# Patient Record
Sex: MALE | Race: WHITE | NOT HISPANIC OR LATINO | Employment: OTHER | URBAN - METROPOLITAN AREA
[De-identification: names, ages, dates, MRNs, and addresses within clinical notes are randomized per-mention and may not be internally consistent; named-entity substitution may affect disease eponyms.]

---

## 2017-01-29 ENCOUNTER — HOSPITAL ENCOUNTER (EMERGENCY)
Facility: HOSPITAL | Age: 77
Discharge: HOME | End: 2017-01-30
Attending: EMERGENCY MEDICINE
Payer: MEDICARE

## 2017-01-29 ENCOUNTER — APPOINTMENT (EMERGENCY)
Dept: RADIOLOGY | Facility: HOSPITAL | Age: 77
End: 2017-01-29
Payer: MEDICARE

## 2017-01-29 DIAGNOSIS — I50.9 CHF EXACERBATION (HCC): Primary | ICD-10-CM

## 2017-01-29 PROCEDURE — 93005 ELECTROCARDIOGRAM TRACING: CPT | Performed by: EMERGENCY MEDICINE

## 2017-01-29 PROCEDURE — 85025 COMPLETE CBC W/AUTO DIFF WBC: CPT | Performed by: EMERGENCY MEDICINE

## 2017-01-29 PROCEDURE — 81001 URINALYSIS AUTO W/SCOPE: CPT | Performed by: EMERGENCY MEDICINE

## 2017-01-29 PROCEDURE — 87086 URINE CULTURE/COLONY COUNT: CPT | Performed by: EMERGENCY MEDICINE

## 2017-01-29 PROCEDURE — 36415 COLL VENOUS BLD VENIPUNCTURE: CPT | Performed by: EMERGENCY MEDICINE

## 2017-01-29 RX ORDER — OXYCODONE HYDROCHLORIDE 10 MG/1
15 TABLET ORAL EVERY 6 HOURS PRN
COMMUNITY
End: 2017-12-19

## 2017-01-29 RX ORDER — ROSUVASTATIN CALCIUM 40 MG/1
40 TABLET, COATED ORAL DAILY
COMMUNITY
End: 2019-01-01 | Stop reason: HOSPADM

## 2017-01-30 ENCOUNTER — APPOINTMENT (EMERGENCY)
Dept: RADIOLOGY | Facility: HOSPITAL | Age: 77
End: 2017-01-30
Payer: MEDICARE

## 2017-01-30 VITALS
SYSTOLIC BLOOD PRESSURE: 183 MMHG | TEMPERATURE: 97.8 F | BODY MASS INDEX: 37.31 KG/M2 | WEIGHT: 260 LBS | DIASTOLIC BLOOD PRESSURE: 85 MMHG | OXYGEN SATURATION: 97 % | HEART RATE: 98 BPM | RESPIRATION RATE: 20 BRPM

## 2017-01-30 LAB
ANION GAP SERPL CALCULATED.3IONS-SCNC: 9 MMOL/L (ref 4–13)
ATRIAL RATE: 85 BPM
BACTERIA UR QL AUTO: NORMAL /HPF
BASOPHILS # BLD AUTO: 0.02 THOUSANDS/ΜL (ref 0–0.1)
BASOPHILS NFR BLD AUTO: 0 % (ref 0–1)
BILIRUB UR QL STRIP: NEGATIVE
BUN SERPL-MCNC: 39 MG/DL (ref 5–25)
CALCIUM SERPL-MCNC: 8.8 MG/DL (ref 8.3–10.1)
CHLORIDE SERPL-SCNC: 103 MMOL/L (ref 100–108)
CLARITY UR: CLEAR
CO2 SERPL-SCNC: 27 MMOL/L (ref 21–32)
COLOR UR: YELLOW
CREAT SERPL-MCNC: 1.59 MG/DL (ref 0.6–1.3)
EOSINOPHIL # BLD AUTO: 0.27 THOUSAND/ΜL (ref 0–0.61)
EOSINOPHIL NFR BLD AUTO: 3 % (ref 0–6)
ERYTHROCYTE [DISTWIDTH] IN BLOOD BY AUTOMATED COUNT: 13.8 % (ref 11.6–15.1)
GFR SERPL CREATININE-BSD FRML MDRD: 42.8 ML/MIN/1.73SQ M
GLUCOSE SERPL-MCNC: 189 MG/DL (ref 65–140)
GLUCOSE UR STRIP-MCNC: ABNORMAL MG/DL
HCT VFR BLD AUTO: 43.4 % (ref 36.5–49.3)
HGB BLD-MCNC: 14.1 G/DL (ref 12–17)
HGB UR QL STRIP.AUTO: ABNORMAL
KETONES UR STRIP-MCNC: NEGATIVE MG/DL
LEUKOCYTE ESTERASE UR QL STRIP: NEGATIVE
LYMPHOCYTES # BLD AUTO: 1.24 THOUSANDS/ΜL (ref 0.6–4.47)
LYMPHOCYTES NFR BLD AUTO: 14 % (ref 14–44)
MCH RBC QN AUTO: 29.8 PG (ref 26.8–34.3)
MCHC RBC AUTO-ENTMCNC: 32.5 G/DL (ref 31.4–37.4)
MCV RBC AUTO: 92 FL (ref 82–98)
MONOCYTES # BLD AUTO: 0.53 THOUSAND/ΜL (ref 0.17–1.22)
MONOCYTES NFR BLD AUTO: 6 % (ref 4–12)
NEUTROPHILS # BLD AUTO: 7.05 THOUSANDS/ΜL (ref 1.85–7.62)
NEUTS SEG NFR BLD AUTO: 77 % (ref 43–75)
NITRITE UR QL STRIP: NEGATIVE
NON-SQ EPI CELLS URNS QL MICRO: NORMAL /HPF
NT-PROBNP SERPL-MCNC: 1276 PG/ML
P AXIS: 67 DEGREES
PH UR STRIP.AUTO: 6 [PH] (ref 4.5–8)
PLATELET # BLD AUTO: 179 THOUSANDS/UL (ref 149–390)
PMV BLD AUTO: 11.9 FL (ref 8.9–12.7)
POTASSIUM SERPL-SCNC: 4.7 MMOL/L (ref 3.5–5.3)
PR INTERVAL: 202 MS
PROT UR STRIP-MCNC: ABNORMAL MG/DL
QRS AXIS: 70 DEGREES
QRSD INTERVAL: 144 MS
QT INTERVAL: 404 MS
QTC INTERVAL: 480 MS
RBC # BLD AUTO: 4.73 MILLION/UL (ref 3.88–5.62)
RBC #/AREA URNS AUTO: NORMAL /HPF
SODIUM SERPL-SCNC: 139 MMOL/L (ref 136–145)
SP GR UR STRIP.AUTO: 1.02 (ref 1–1.03)
T WAVE AXIS: 3 DEGREES
TROPONIN I SERPL-MCNC: 0.04 NG/ML
UROBILINOGEN UR QL STRIP.AUTO: 0.2 E.U./DL
VENTRICULAR RATE: 85 BPM
WBC # BLD AUTO: 9.11 THOUSAND/UL (ref 4.31–10.16)
WBC #/AREA URNS AUTO: NORMAL /HPF

## 2017-01-30 PROCEDURE — 71020 HB CHEST X-RAY 2VW FRONTAL&LATL: CPT

## 2017-01-30 PROCEDURE — 96374 THER/PROPH/DIAG INJ IV PUSH: CPT

## 2017-01-30 PROCEDURE — 36415 COLL VENOUS BLD VENIPUNCTURE: CPT | Performed by: EMERGENCY MEDICINE

## 2017-01-30 PROCEDURE — 99285 EMERGENCY DEPT VISIT HI MDM: CPT

## 2017-01-30 PROCEDURE — 84484 ASSAY OF TROPONIN QUANT: CPT | Performed by: EMERGENCY MEDICINE

## 2017-01-30 PROCEDURE — 83880 ASSAY OF NATRIURETIC PEPTIDE: CPT | Performed by: EMERGENCY MEDICINE

## 2017-01-30 PROCEDURE — 80048 BASIC METABOLIC PNL TOTAL CA: CPT | Performed by: EMERGENCY MEDICINE

## 2017-01-30 RX ORDER — FUROSEMIDE 10 MG/ML
40 INJECTION INTRAMUSCULAR; INTRAVENOUS ONCE
Status: COMPLETED | OUTPATIENT
Start: 2017-01-30 | End: 2017-01-30

## 2017-01-30 RX ADMIN — FUROSEMIDE 40 MG: 10 INJECTION, SOLUTION INTRAMUSCULAR; INTRAVENOUS at 01:42

## 2017-01-31 LAB — BACTERIA UR CULT: NORMAL

## 2017-12-19 ENCOUNTER — HOSPITAL ENCOUNTER (INPATIENT)
Facility: HOSPITAL | Age: 77
LOS: 3 days | Discharge: HOME | DRG: 309 | End: 2017-12-22
Attending: EMERGENCY MEDICINE | Admitting: INTERNAL MEDICINE
Payer: OTHER GOVERNMENT

## 2017-12-19 DIAGNOSIS — I48.91 RAPID ATRIAL FIBRILLATION (HCC): Primary | ICD-10-CM

## 2017-12-19 DIAGNOSIS — N28.9 RENAL INSUFFICIENCY: ICD-10-CM

## 2017-12-19 PROBLEM — E78.5 HYPERLIPIDEMIA: Chronic | Status: ACTIVE | Noted: 2017-12-19

## 2017-12-19 PROBLEM — I50.32 CHRONIC DIASTOLIC CONGESTIVE HEART FAILURE (HCC): Status: ACTIVE | Noted: 2017-12-19

## 2017-12-19 PROBLEM — M51.37 DDD (DEGENERATIVE DISC DISEASE), LUMBOSACRAL: Status: ACTIVE | Noted: 2017-12-19

## 2017-12-19 PROBLEM — E11.22 TYPE 2 DIABETES MELLITUS WITH STAGE 4 CHRONIC KIDNEY DISEASE (HCC): Chronic | Status: ACTIVE | Noted: 2017-12-19

## 2017-12-19 PROBLEM — I10 ESSENTIAL HYPERTENSION: Chronic | Status: ACTIVE | Noted: 2017-12-19

## 2017-12-19 PROBLEM — N18.4 TYPE 2 DIABETES MELLITUS WITH STAGE 4 CHRONIC KIDNEY DISEASE (HCC): Chronic | Status: ACTIVE | Noted: 2017-12-19

## 2017-12-19 LAB
ALBUMIN SERPL BCP-MCNC: 3.2 G/DL (ref 3.5–5)
ALP SERPL-CCNC: 89 U/L (ref 46–116)
ALT SERPL W P-5'-P-CCNC: 28 U/L (ref 12–78)
ANION GAP SERPL CALCULATED.3IONS-SCNC: 8 MMOL/L (ref 4–13)
APTT PPP: 27 SECONDS (ref 23–35)
AST SERPL W P-5'-P-CCNC: 15 U/L (ref 5–45)
ATRIAL RATE: 141 BPM
BASOPHILS # BLD AUTO: 0.03 THOUSANDS/ΜL (ref 0–0.1)
BASOPHILS NFR BLD AUTO: 0 % (ref 0–1)
BILIRUB SERPL-MCNC: 0.4 MG/DL (ref 0.2–1)
BILIRUB UR QL STRIP: NEGATIVE
BUN SERPL-MCNC: 65 MG/DL (ref 5–25)
CALCIUM SERPL-MCNC: 8.9 MG/DL (ref 8.3–10.1)
CHLORIDE SERPL-SCNC: 101 MMOL/L (ref 100–108)
CLARITY UR: CLEAR
CO2 SERPL-SCNC: 29 MMOL/L (ref 21–32)
COLOR UR: YELLOW
CREAT SERPL-MCNC: 2.15 MG/DL (ref 0.6–1.3)
EOSINOPHIL # BLD AUTO: 0.72 THOUSAND/ΜL (ref 0–0.61)
EOSINOPHIL NFR BLD AUTO: 8 % (ref 0–6)
ERYTHROCYTE [DISTWIDTH] IN BLOOD BY AUTOMATED COUNT: 14.4 % (ref 11.6–15.1)
GFR SERPL CREATININE-BSD FRML MDRD: 29 ML/MIN/1.73SQ M
GLUCOSE SERPL-MCNC: 272 MG/DL (ref 65–140)
GLUCOSE SERPL-MCNC: 278 MG/DL (ref 65–140)
GLUCOSE SERPL-MCNC: 321 MG/DL (ref 65–140)
GLUCOSE UR STRIP-MCNC: ABNORMAL MG/DL
HCT VFR BLD AUTO: 42.6 % (ref 36.5–49.3)
HGB BLD-MCNC: 13.8 G/DL (ref 12–17)
HGB UR QL STRIP.AUTO: NEGATIVE
INR PPP: 1.04 (ref 0.86–1.16)
KETONES UR STRIP-MCNC: NEGATIVE MG/DL
LEUKOCYTE ESTERASE UR QL STRIP: NEGATIVE
LYMPHOCYTES # BLD AUTO: 1.9 THOUSANDS/ΜL (ref 0.6–4.47)
LYMPHOCYTES NFR BLD AUTO: 21 % (ref 14–44)
MAGNESIUM SERPL-MCNC: 1.9 MG/DL (ref 1.6–2.6)
MCH RBC QN AUTO: 30.3 PG (ref 26.8–34.3)
MCHC RBC AUTO-ENTMCNC: 32.4 G/DL (ref 31.4–37.4)
MCV RBC AUTO: 93 FL (ref 82–98)
MONOCYTES # BLD AUTO: 1.11 THOUSAND/ΜL (ref 0.17–1.22)
MONOCYTES NFR BLD AUTO: 12 % (ref 4–12)
NEUTROPHILS # BLD AUTO: 5.45 THOUSANDS/ΜL (ref 1.85–7.62)
NEUTS SEG NFR BLD AUTO: 59 % (ref 43–75)
NITRITE UR QL STRIP: NEGATIVE
PH UR STRIP.AUTO: 5.5 [PH] (ref 4.5–8)
PLATELET # BLD AUTO: 182 THOUSANDS/UL (ref 149–390)
PMV BLD AUTO: 11.8 FL (ref 8.9–12.7)
POTASSIUM SERPL-SCNC: 4.9 MMOL/L (ref 3.5–5.3)
PROT SERPL-MCNC: 6.8 G/DL (ref 6.4–8.2)
PROT UR STRIP-MCNC: NEGATIVE MG/DL
PROTHROMBIN TIME: 13.4 SECONDS (ref 12.1–14.4)
QRS AXIS: 70 DEGREES
QRSD INTERVAL: 142 MS
QT INTERVAL: 346 MS
QTC INTERVAL: 516 MS
RBC # BLD AUTO: 4.56 MILLION/UL (ref 3.88–5.62)
SODIUM SERPL-SCNC: 138 MMOL/L (ref 136–145)
SP GR UR STRIP.AUTO: >=1.03 (ref 1–1.03)
T WAVE AXIS: 23 DEGREES
TROPONIN I SERPL-MCNC: 0.07 NG/ML
TSH SERPL DL<=0.05 MIU/L-ACNC: 6.35 UIU/ML (ref 0.36–3.74)
UROBILINOGEN UR QL STRIP.AUTO: 0.2 E.U./DL
VENTRICULAR RATE: 134 BPM
WBC # BLD AUTO: 9.21 THOUSAND/UL (ref 4.31–10.16)

## 2017-12-19 PROCEDURE — 85610 PROTHROMBIN TIME: CPT | Performed by: EMERGENCY MEDICINE

## 2017-12-19 PROCEDURE — 93005 ELECTROCARDIOGRAM TRACING: CPT

## 2017-12-19 PROCEDURE — 83735 ASSAY OF MAGNESIUM: CPT | Performed by: EMERGENCY MEDICINE

## 2017-12-19 PROCEDURE — 96374 THER/PROPH/DIAG INJ IV PUSH: CPT

## 2017-12-19 PROCEDURE — 81003 URINALYSIS AUTO W/O SCOPE: CPT | Performed by: EMERGENCY MEDICINE

## 2017-12-19 PROCEDURE — 80053 COMPREHEN METABOLIC PANEL: CPT | Performed by: EMERGENCY MEDICINE

## 2017-12-19 PROCEDURE — 99285 EMERGENCY DEPT VISIT HI MDM: CPT

## 2017-12-19 PROCEDURE — 82948 REAGENT STRIP/BLOOD GLUCOSE: CPT

## 2017-12-19 PROCEDURE — 85730 THROMBOPLASTIN TIME PARTIAL: CPT | Performed by: EMERGENCY MEDICINE

## 2017-12-19 PROCEDURE — 84443 ASSAY THYROID STIM HORMONE: CPT | Performed by: EMERGENCY MEDICINE

## 2017-12-19 PROCEDURE — 36415 COLL VENOUS BLD VENIPUNCTURE: CPT | Performed by: EMERGENCY MEDICINE

## 2017-12-19 PROCEDURE — 85025 COMPLETE CBC W/AUTO DIFF WBC: CPT | Performed by: EMERGENCY MEDICINE

## 2017-12-19 PROCEDURE — 84484 ASSAY OF TROPONIN QUANT: CPT | Performed by: EMERGENCY MEDICINE

## 2017-12-19 RX ORDER — GABAPENTIN 300 MG/1
100 CAPSULE ORAL 3 TIMES DAILY
COMMUNITY
End: 2019-01-01 | Stop reason: HOSPADM

## 2017-12-19 RX ORDER — ATORVASTATIN CALCIUM 40 MG/1
40 TABLET, FILM COATED ORAL
Status: DISCONTINUED | OUTPATIENT
Start: 2017-12-19 | End: 2017-12-22 | Stop reason: HOSPADM

## 2017-12-19 RX ORDER — ACETAMINOPHEN 325 MG/1
650 TABLET ORAL EVERY 6 HOURS PRN
Status: DISCONTINUED | OUTPATIENT
Start: 2017-12-19 | End: 2017-12-22 | Stop reason: HOSPADM

## 2017-12-19 RX ORDER — DULOXETIN HYDROCHLORIDE 30 MG/1
30 CAPSULE, DELAYED RELEASE ORAL DAILY
Status: DISCONTINUED | OUTPATIENT
Start: 2017-12-19 | End: 2017-12-22 | Stop reason: HOSPADM

## 2017-12-19 RX ORDER — AMLODIPINE BESYLATE 5 MG/1
5 TABLET ORAL DAILY
COMMUNITY
End: 2017-12-22 | Stop reason: HOSPADM

## 2017-12-19 RX ORDER — ONDANSETRON 2 MG/ML
4 INJECTION INTRAMUSCULAR; INTRAVENOUS EVERY 6 HOURS PRN
Status: DISCONTINUED | OUTPATIENT
Start: 2017-12-19 | End: 2017-12-22 | Stop reason: HOSPADM

## 2017-12-19 RX ORDER — DILTIAZEM HYDROCHLORIDE 5 MG/ML
15 INJECTION INTRAVENOUS ONCE
Status: COMPLETED | OUTPATIENT
Start: 2017-12-19 | End: 2017-12-19

## 2017-12-19 RX ADMIN — APIXABAN 5 MG: 5 TABLET, FILM COATED ORAL at 15:37

## 2017-12-19 RX ADMIN — METOPROLOL TARTRATE 25 MG: 25 TABLET ORAL at 21:27

## 2017-12-19 RX ADMIN — INSULIN DETEMIR 50 UNITS: 100 INJECTION, SOLUTION SUBCUTANEOUS at 21:31

## 2017-12-19 RX ADMIN — METOPROLOL TARTRATE 25 MG: 25 TABLET ORAL at 15:35

## 2017-12-19 RX ADMIN — DULOXETINE HYDROCHLORIDE 30 MG: 30 CAPSULE, DELAYED RELEASE ORAL at 15:35

## 2017-12-19 RX ADMIN — INSULIN LISPRO 2 UNITS: 100 INJECTION, SOLUTION INTRAVENOUS; SUBCUTANEOUS at 17:07

## 2017-12-19 RX ADMIN — DILTIAZEM HYDROCHLORIDE 15 MG: 5 INJECTION INTRAVENOUS at 13:50

## 2017-12-19 NOTE — ED NOTES
Patient resting comfortably in bed  No signs of distress  Denies any current needs       Silvia Escamilla RN  12/19/17 5039

## 2017-12-19 NOTE — ED NOTES
Patient states "not opening up the lisinopril and running out the bottle of the combo med (20 mg, hydrochlorothiazide 12 5 mg)     Yash Gómez, RN  12/19/17 7924

## 2017-12-19 NOTE — ED PROVIDER NOTES
History  Chief Complaint   Patient presents with    Rapid Heart Rate     Patient presents to ED after going to his [address] [facility] clinic yesterday 12/18 where he was advised to go to an ED, patient reports coming here leaving and coming in today for evaluation  This is a 72-year-old male who states that he was at his primary care physician's office yesterday when they noticed that he was in a rapid heart rhythm he was instructed to go to the hospital yesterday but he did not get evaluated because he was afraid he did not have insurance coverage she returns now stating that he believes he does have insurance coverage and wishes to get evaluated  He has a history congestive heart failure but no atrial fibrillation he denies any excessive caffeine use he does drink 1 cup of coffee a day denies any decongestant use no alcohol  Also denies any symptoms of infection no cough or urinary burning        History provided by:  Patient  Palpitations   Palpitations quality:  Irregular  Onset quality:  Unable to specify  Duration: 1St noticed yesterday but unsure as to the time onset prior to that  Timing:  Constant  Chronicity:  New  Context: not blood loss, not illicit drugs and not stimulant use    Associated symptoms: no chest pain, no nausea and no vomiting    Risk factors: diabetes mellitus and hyperthyroidism        Prior to Admission Medications   Prescriptions Last Dose Informant Patient Reported? Taking?    amLODIPine (NORVASC) 5 mg tablet   Yes Yes   Sig: Take 5 mg by mouth daily   gabapentin (NEURONTIN) 300 mg capsule   Yes Yes   Sig: Take 100 mg by mouth 3 (three) times a day   insulin aspart (NovoLOG) 100 units/mL injection   Yes Yes   Sig: Inject under the skin 3 (three) times a day before meals   insulin detemir (LEVEMIR) 100 units/mL subcutaneous injection   Yes Yes   Sig: Inject 50 Units under the skin daily at bedtime   lisinopril 20 mg TABS 20 mg, hydrochlorothiazide 12 5 mg TABS 12 5 mg   Yes No   Sig: Take by mouth daily   rosuvastatin (CRESTOR) 40 MG tablet   Yes No   Sig: Take 40 mg by mouth daily      Facility-Administered Medications: None       Past Medical History:   Diagnosis Date    CHF [facility]congestive heart failure) ([facility] )     Diabetes mellitus ([facility] )     Hypertension        Past Surgical History:   Procedure Laterality Date    CAROTID ENDARTERECTOMY Left        Family History   Problem Relation Age of Onset    Cancer Mother     Cancer Father      I have reviewed and agree with the history as documented  Social History   Substance Use Topics    Smoking status: Former Smoker    Smokeless tobacco: Never Used    Alcohol use No        Review of Systems   Cardiovascular: Positive for palpitations  Negative for chest pain  Gastrointestinal: Negative for nausea and vomiting  All other systems reviewed and are negative  Physical Exam  ED Triage Vitals   Temperature Pulse Respirations Blood Pressure SpO2   12/19/17 1210 12/19/17 1204 12/19/17 1204 12/19/17 1204 12/19/17 1204   98 4 °F (36 9 °C) (!) 140 19 109/59 96 %      Temp Source Heart Rate Source Patient Position - Orthostatic VS BP Location FiO2 (%)   12/19/17 1210 12/19/17 1204 12/19/17 1204 12/19/17 1204 --   Tympanic Monitor Lying Right arm       Pain Score       12/19/17 1204       No Pain           Orthostatic Vital Signs  Vitals:    12/19/17 1415 12/19/17 1430 12/19/17 1447 12/19/17 1503   BP:  110/56 113/55 139/67   Pulse: 94 (!) 111 (!) 110 (!) 123   Patient Position - Orthostatic VS:   Lying Sitting       Physical Exam   Constitutional: He is oriented to person, place, and time  He appears well-developed and well-nourished  No distress  HENT:   Head: Atraumatic  Right Ear: External ear normal    Left Ear: External ear normal    Mouth/Throat: Oropharynx is clear and moist    Eyes: EOM are normal  Pupils are equal, round, and reactive to light  No scleral icterus  Neck: Normal range of motion  Neck supple  No tracheal deviation present  Cardiovascular: Intact distal pulses  Exam reveals no gallop and no friction rub  No murmur heard  Tachy and irregular   Pulmonary/Chest: Effort normal and breath sounds normal  No respiratory distress  He has no wheezes  He has no rales  Abdominal: Soft  Bowel sounds are normal  He exhibits no distension  There is no tenderness  There is no guarding  Musculoskeletal: Normal range of motion  He exhibits no edema, tenderness or deformity  Neurological: He is alert and oriented to person, place, and time  No cranial nerve deficit  Skin: Skin is warm and dry  No rash noted  He is not diaphoretic  Psychiatric: He has a normal mood and affect  His behavior is normal  Thought content normal    Nursing note and vitals reviewed        ED Medications  Medications   insulin detemir (LEVEMIR) subcutaneous injection 50 Units (not administered)   atorvastatin (LIPITOR) tablet 40 mg (40 mg Oral Not Given 12/19/17 1535)   metoprolol tartrate (LOPRESSOR) tablet 25 mg (25 mg Oral Given 12/19/17 1535)   apixaban (ELIQUIS) tablet 5 mg (5 mg Oral Given 12/19/17 1537)   ondansetron (ZOFRAN) injection 4 mg (not administered)   insulin lispro (HumaLOG) 100 units/mL subcutaneous injection 5 Units (not administered)   acetaminophen (TYLENOL) tablet 650 mg (not administered)   insulin lispro (HumaLOG) 100 units/mL subcutaneous injection 1-5 Units (not administered)   DULoxetine (CYMBALTA) delayed release capsule 30 mg (30 mg Oral Given 12/19/17 1535)   diltiazem (CARDIZEM) injection 15 mg (15 mg Intravenous Given 12/19/17 1350)       Diagnostic Studies  Results Reviewed     Procedure Component Value Units Date/Time    TSH [[id number]]  (Abnormal) Collected:  12/19/17 1301    Lab Status:  Final result Specimen:  Blood from Arm, Left Updated:  12/19/17 1449     TSH 3RD GENERATON 6 349 (H) uIU/mL     Narrative:         Patients undergoing fluorescein dye angiography may retain small amounts of fluorescein in the body for 48-72 hours post procedure  Samples containing fluorescein can produce falsely depressed TSH values  If the patient had this procedure,a specimen should be resubmitted post fluorescein clearance  Magnesium [[id number]]  (Normal) Collected:  12/19/17 1301    Lab Status:  Final result Specimen:  Blood from Arm, Left Updated:  12/19/17 1449     Magnesium 1 9 mg/dL     Troponin I [[id number]]  (Abnormal) Collected:  12/19/17 1339    Lab Status:  Final result Specimen:  Blood from Arm, Right Updated:  12/19/17 1413     Troponin I 0 07 (H) ng/mL     Narrative:         [employer] Chemistry analyzer 99% cutoff is > 0 04 ng/mL in network labs    o cTnI 99% cutoff is useful only when applied to patients in the clinical setting of myocardial ischemia  o cTnI 99% cutoff should be interpreted in the context of clinical history, ECG findings and possibly cardiac imaging to establish correct diagnosis  o cTnI 99% cutoff may be suggestive but clearly not indicative of a coronary event without the clinical setting of myocardial ischemia  UA w Reflex to Microscopic w Reflex to Culture [[id number]]  (Abnormal) Collected:  12/19/17 1357    Lab Status:  Final result Specimen:  Urine from Urine, Clean Catch Updated:  12/19/17 1411     Color, UA Yellow     Clarity, UA Clear     Specific Gravity, UA >=1 030     pH, UA 5 5     Leukocytes, UA Negative     Nitrite, UA Negative     Protein, UA Negative mg/dl      Glucose,  (1/4%) (A) mg/dl      Ketones, UA Negative mg/dl      Urobilinogen, UA 0 2 E U /dl      Bilirubin, UA Negative     Blood, UA Negative    Protime-INR [[id number]]  (Normal) Collected:  12/19/17 1339    Lab Status:  Final result Specimen:  Blood from Arm, Right Updated:  12/19/17 1405     Protime 13 4 seconds      INR 1 04    APTT [[id number]]  (Normal) Collected:  12/19/17 1339    Lab Status:  Final result Specimen:  Blood from Arm, Right Updated:  12/19/17 1405     PTT 27 seconds     Narrative:          Therapeutic Heparin Range = 60-90 seconds    Comprehensive metabolic panel [[id number]]  (Abnormal) Collected:  12/19/17 1301    Lab Status:  Final result Specimen:  Blood from Arm, Left Updated:  12/19/17 1331     Sodium 138 mmol/L      Potassium 4 9 mmol/L      Chloride 101 mmol/L      CO2 29 mmol/L      Anion Gap 8 mmol/L      BUN 65 (H) mg/dL      Creatinine 2 15 (H) mg/dL      Glucose 272 (H) mg/dL      Calcium 8 9 mg/dL      AST 15 U/L      ALT 28 U/L      Alkaline Phosphatase 89 U/L      Total Protein 6 8 g/dL      Albumin 3 2 (L) g/dL      Total Bilirubin 0 40 mg/dL      eGFR 29 ml/min/1 73sq m     Narrative:         National Kidney Disease Education Program recommendations are as follows:  GFR calculation is accurate only with a steady state creatinine  Chronic Kidney disease less than 60 ml/min/1 73 sq  meters  Kidney failure less than 15 ml/min/1 73 sq  meters      CBC and differential [[id number]]  (Abnormal) Collected:  12/19/17 1301    Lab Status:  Final result Specimen:  Blood from Arm, Left Updated:  12/19/17 1312     WBC 9 21 Thousand/uL      RBC 4 56 Million/uL      Hemoglobin 13 8 g/dL      Hematocrit 42 6 %      MCV 93 fL      MCH 30 3 pg      MCHC 32 4 g/dL      RDW 14 4 %      MPV 11 8 fL      Platelets 080 Thousands/uL      Neutrophils Relative 59 %      Lymphocytes Relative 21 %      Monocytes Relative 12 %      Eosinophils Relative 8 (H) %      Basophils Relative 0 %      Neutrophils Absolute 5 45 Thousands/µL      Lymphocytes Absolute 1 90 Thousands/µL      Monocytes Absolute 1 11 Thousand/µL      Eosinophils Absolute 0 72 (H) Thousand/µL      Basophils Absolute 0 03 Thousands/µL                  No orders to display              Procedures  ECG 12 Lead Documentation  Date/Time: 12/19/2017 1:43 PM  Performed by: Delmis Langston  Authorized by: Delmis Langston     ECG reviewed by me, the ED Provider: yes    Patient location:  ED  Rate:     ECG rate assessment: tachycardic    Rhythm:     Rhythm: atrial fibrillation Conduction:     Conduction: abnormal      Abnormal conduction: complete RBBB    T waves:     T waves: normal             Phone Contacts  ED Phone Contact    ED Course  ED Course as of Dec 19 1548   Tue Dec 19, 2017   1406  Heart rate now decreased to around 100 after IV Cardizem will admit for further evaluation                                [facility]  CritCare Time    Disposition  Final diagnoses:   Rapid atrial fibrillation ([address] )   Renal insufficiency     Time reflects when diagnosis was documented in both MDM as applicable and the Disposition within this note     Time User Action Codes Description Comment    12/19/2017  2:07 PM Suze Harris [I48 91] Rapid atrial fibrillation ([address] )     12/19/2017  2:07 PM Suze Ryan Add [N28 9] Renal insufficiency       ED Disposition     ED Disposition Condition Comment    Admit  Case was discussed with *Dr Ebony Neville** and the patient's admission status was agreed to be Admission Status: inpatient status to the service of Dr Khurram Gifford**   Follow-up Information    None       Current Discharge Medication List      CONTINUE these medications which have NOT CHANGED    Details   amLODIPine (NORVASC) 5 mg tablet Take 5 mg by mouth daily      gabapentin (NEURONTIN) 300 mg capsule Take 100 mg by mouth 3 (three) times a day      insulin aspart (NovoLOG) 100 units/mL injection Inject under the skin 3 (three) times a day before meals      insulin detemir (LEVEMIR) 100 units/mL subcutaneous injection Inject 50 Units under the skin daily at bedtime      lisinopril 20 mg TABS 20 mg, hydrochlorothiazide 12 5 mg TABS 12 5 mg Take by mouth daily      rosuvastatin (CRESTOR) 40 MG tablet Take 40 mg by mouth daily           No discharge procedures on file      ED Provider  Electronically Signed by           Davin Giles DO  12/19/17 1541

## 2017-12-19 NOTE — H&P
H&P Exam - Yovany Degree 78 y o  male MRN: [id number]    Unit/Bed#: -02 Encounter: [id number]      Assessment/Plan     Assessment:  Patient Active Problem List    Diagnosis Date Noted    Atrial fibrillation with rapid ventricular response ([facility] ) 12/19/2017     Priority: A    Type 2 diabetes mellitus with stage 4 chronic kidney disease ([facility] ) 12/19/2017     Priority: B    Essential hypertension 12/19/2017     Priority: C    DDD (degenerative disc disease), lumbosacral 12/19/2017     Priority: D    Hyperlipidemia 12/19/2017    Chronic diastolic congestive heart failure ([facility] ) 12/19/2017         Plan:  Patient has a for fibrillation with rapid ventricular rate  Unfortunately it is not really known for how long he has been atrial fibrillation  Patient's heart rate is much better after diltiazem 15 mg IV in the ER  Admitting him to the step-down unit  Will continue telemetry monitoring  Will use metoprolol 25 minutes q 6 hours for rate control  His chads Vasc score is at least 4 will start patient on anticoagulation for CVA prophylaxis with Eliquis  Will get TSH, echocardiogram   Will ask Cardiology to see the patient  Patient has chronic diastolic CHF without evidence of volume overload  Will hold patient's amlodipine and lisinopril while on metoprolol as his blood pressures is around 354 systolic  Patient has type 2 diabetes with stage IV chronic disease  His blood sugars are uncontrolled  Will continue Levemir and pre meal Humalog with serial blood sugar measurements  Patient's stage IV chronic disease is at baseline  Patient has chronic neuropathic pain that he attributes to degenerative disc disease of the lumbar sacral spine  He blames gabapentin for the development of atrial fibrillation and wishes to go back to oxycodone that his [place] physicians discontinued  I told patient that I will try Cymbalta  Continue  statin for hyperlipidemia      Patient is likely remaining in the hospital for more than 2 midnights  I discussed the case with patient's primary care provider at the [facility] dr [facility] at [phone]        History of Present Illness     HPI:  Martell Linda is a 78 y o  male who presents with chief complaint of I have atrial fibrillation  Patient had irregular follow-up appointment at the [facility] clinic yesterday where he was found to to be in atrial fibrillation  He was advised to go to the ER for evaluation  Patient came to the ER where he was found to be in atrial fibrillation with a heart rate of 140 beats per minute, EKG showed atrial fibrillation with right bundle-branch block  Patient denies any lightheadedness, dizziness, chest pain, shortness of breath, exertional fatigue, palpitations or fluttering in the chest   He thinks that he had a fast heartbeat 6 months ago when he was started on gabapentin from for chronic low back pain due to degenerative disc disease  Patient has a history of TIA or CVA  He denies any signs of bleeding  He does have a history of CHF after eating a lot of salty food for on Memorial Day in 2014  His ejection fraction is 17% with LV diastolic dysfunction according to an echo in the [facility] system  He has history of hypertension and he status post left carotid endarterectomy  Review of Systems as above and denies headache, change in vision, nausea, abdominal pain, diarrhea, difficulty urinating  His blood sugars are anywhere between 150 and 400  His baseline creatinine runs between 1 7 and 2 8  Currently 2 1    All other ROS are negative      Historical Information   Past Medical History:   Diagnosis Date    [facility]     Diabetes mellitus ([facility] )     Hypertension      Past Surgical History:   Procedure Laterality Date    CAROTID ENDARTERECTOMY Left      Social History   History   Alcohol Use No     History   Drug Use No     History   Smoking Status    Former Smoker   Smokeless Tobacco    Never Used     Family History:   Family History   Problem Relation Age of Onset    Cancer Mother     Cancer Father        Meds/Allergies   Prior to Admission Medications   Prescriptions Last Dose Informant Patient Reported? Taking? amLODIPine (NORVASC) 5 mg tablet   Yes Yes   Sig: Take 5 mg by mouth daily   gabapentin (NEURONTIN) 300 mg capsule   Yes Yes   Sig: Take 100 mg by mouth 3 (three) times a day   insulin aspart (NovoLOG) 100 units/mL injection   Yes Yes   Sig: Inject under the skin 3 (three) times a day before meals   insulin detemir (LEVEMIR) 100 units/mL subcutaneous injection   Yes Yes   Sig: Inject 50 Units under the skin daily at bedtime   lisinopril 20 mg TABS 20 mg, hydrochlorothiazide 12 5 mg TABS 12 5 mg   Yes No   Sig: Take by mouth daily   rosuvastatin (CRESTOR) 40 MG tablet   Yes No   Sig: Take 40 mg by mouth daily      Facility-Administered Medications: None     Allergies   Allergen Reactions    Motrin [Ibuprofen] GI Intolerance       Objective   Vitals: Blood pressure 139/67, pulse (!) 123, temperature 98 4 °F (36 9 °C), temperature source Tympanic, resp  rate 20, height 5' 10" (1 778 m), weight 113 kg (250 lb), SpO2 98 %      No intake or output data in the 24 hours ending 12/19/17 1514    Invasive Devices     Peripheral Intravenous Line            Peripheral IV 12/19/17 Right Antecubital less than 1 day                Physical exam    Alert and awake in no acute distress at rest on room air  Head normocephalic, PERRLA   Oral membranes are moist, no pharyngeal exudates seen  Neck supple, no lymphadenopathy  Lungs clear to auscultation bilaterally  Heart irregularly irregular tachycardic, normal heart sounds  Abdomen soft, active bowel sounds, non-tender, non-distended  Extremities: there is no joint effusion or warmth  Skin: warm, no hives seen, no cellulitis seen there is no lower extremity edema or ulcers  Neuro: no facial droop, tongue is midline, strength 5/5 bilateral and equal, speech is normal  Psych: appropriate mood and affect    Lab Results: I have personally reviewed pertinent reports  See below  Imaging: I have personally reviewed pertinent reports  See below  EKG, Pathology, and Other Studies: I have personally reviewed pertinent reports  Current Facility-Administered Medications:     sodium chloride 0 9 % bolus 500 mL, 500 mL, Intravenous, Once      Admission on 12/19/2017   Component Date Value    Ventricular Rate 12/19/2017 134     Atrial Rate 12/19/2017 141     QRSD Interval 12/19/2017 142     QT Interval 12/19/2017 346     QTC Interval 12/19/2017 516     QRS Axis 12/19/2017 70     T Wave Axis 12/19/2017 23     Sodium 12/19/2017 138     Potassium 12/19/2017 4 9     Chloride 12/19/2017 101     CO2 12/19/2017 29     Anion Gap 12/19/2017 8     BUN 12/19/2017 65*    Creatinine 12/19/2017 2  15*    Glucose 12/19/2017 272*    Calcium 12/19/2017 8 9     AST 12/19/2017 15     ALT 12/19/2017 28     Alkaline Phosphatase 12/19/2017 89     Total Protein 12/19/2017 6 8     Albumin 12/19/2017 3 2*    Total Bilirubin 12/19/2017 0 40     eGFR 12/19/2017 29     WBC 12/19/2017 9 21     RBC 12/19/2017 4 56     Hemoglobin 12/19/2017 13 8     Hematocrit 12/19/2017 42 6     MCV 12/19/2017 93     MCH 12/19/2017 30 3     MCHC 12/19/2017 32 4     RDW 12/19/2017 14 4     MPV 12/19/2017 11 8     Platelets 55/55/8763 182     Neutrophils Relative 12/19/2017 59     Lymphocytes Relative 12/19/2017 21     Monocytes Relative 12/19/2017 12     Eosinophils Relative 12/19/2017 8*    Basophils Relative 12/19/2017 0     Neutrophils Absolute 12/19/2017 5 45     Lymphocytes Absolute 12/19/2017 1 90     Monocytes Absolute 12/19/2017 1 11     Eosinophils Absolute 12/19/2017 0 72*    Basophils Absolute 12/19/2017 0 03     Protime 12/19/2017 13 4     INR 12/19/2017 1 04     PTT 12/19/2017 27     TSH 3RD GENERATON 12/19/2017 6 349*    Magnesium 12/19/2017 1 9     Troponin I 12/19/2017 0 07*    Color, UA 12/19/2017 Yellow     Clarity, UA 12/19/2017 Clear     Specific Gravity, UA 12/19/2017 >=1 030     pH, UA 12/19/2017 5 5     Leukocytes, UA 12/19/2017 Negative     Nitrite, UA 12/19/2017 Negative     Protein, UA 12/19/2017 Negative     Glucose, UA 12/19/2017 250 (1/4%)*    Ketones, UA 12/19/2017 Negative     Urobilinogen, UA 12/19/2017 0 2     Bilirubin, UA 12/19/2017 Negative     Blood, UA 12/19/2017 Negative          No results found          Quin Guzman MD

## 2017-12-19 NOTE — ED PROCEDURE NOTE
PROCEDURE  CriticalCare Time  Performed by: Deuce Albrecht  Authorized by: Deuce Albrecht     Critical care provider statement:     Critical care time (minutes):  30    Critical care time was exclusive of:  Separately billable procedures and treating other patients    Critical care was necessary to treat or prevent imminent or life-threatening deterioration of the following conditions: Rapid heart rhythm      Critical care was time spent personally by me on the following activities:  Obtaining history from patient or surrogate, development of treatment plan with patient or surrogate, discussions with consultants, evaluation of patient's response to treatment, examination of patient, interpretation of cardiac output measurements, ordering and performing treatments and interventions, ordering and review of laboratory studies and re-evaluation of patient's condition    I assumed direction of critical care for this patient from another provider in my specialty: no

## 2017-12-20 ENCOUNTER — APPOINTMENT (INPATIENT)
Dept: NON INVASIVE DIAGNOSTICS | Facility: HOSPITAL | Age: 77
DRG: 309 | End: 2017-12-20
Payer: OTHER GOVERNMENT

## 2017-12-20 PROBLEM — E03.9 HYPOTHYROIDISM: Status: ACTIVE | Noted: 2017-12-20

## 2017-12-20 LAB
ANION GAP SERPL CALCULATED.3IONS-SCNC: 7 MMOL/L (ref 4–13)
BUN SERPL-MCNC: 65 MG/DL (ref 5–25)
CALCIUM SERPL-MCNC: 8.3 MG/DL (ref 8.3–10.1)
CHLORIDE SERPL-SCNC: 103 MMOL/L (ref 100–108)
CO2 SERPL-SCNC: 26 MMOL/L (ref 21–32)
CREAT SERPL-MCNC: 2.01 MG/DL (ref 0.6–1.3)
ERYTHROCYTE [DISTWIDTH] IN BLOOD BY AUTOMATED COUNT: 14.4 % (ref 11.6–15.1)
GFR SERPL CREATININE-BSD FRML MDRD: 32 ML/MIN/1.73SQ M
GLUCOSE SERPL-MCNC: 156 MG/DL (ref 65–140)
GLUCOSE SERPL-MCNC: 256 MG/DL (ref 65–140)
GLUCOSE SERPL-MCNC: 259 MG/DL (ref 65–140)
GLUCOSE SERPL-MCNC: 269 MG/DL (ref 65–140)
GLUCOSE SERPL-MCNC: 308 MG/DL (ref 65–140)
HCT VFR BLD AUTO: 39.4 % (ref 36.5–49.3)
HGB BLD-MCNC: 12.3 G/DL (ref 12–17)
MCH RBC QN AUTO: 29.2 PG (ref 26.8–34.3)
MCHC RBC AUTO-ENTMCNC: 31.2 G/DL (ref 31.4–37.4)
MCV RBC AUTO: 94 FL (ref 82–98)
PLATELET # BLD AUTO: 167 THOUSANDS/UL (ref 149–390)
PMV BLD AUTO: 10.9 FL (ref 8.9–12.7)
POTASSIUM SERPL-SCNC: 4.8 MMOL/L (ref 3.5–5.3)
RBC # BLD AUTO: 4.21 MILLION/UL (ref 3.88–5.62)
SODIUM SERPL-SCNC: 136 MMOL/L (ref 136–145)
WBC # BLD AUTO: 8.42 THOUSAND/UL (ref 4.31–10.16)

## 2017-12-20 PROCEDURE — 82948 REAGENT STRIP/BLOOD GLUCOSE: CPT

## 2017-12-20 PROCEDURE — 80048 BASIC METABOLIC PNL TOTAL CA: CPT | Performed by: INTERNAL MEDICINE

## 2017-12-20 PROCEDURE — 93306 TTE W/DOPPLER COMPLETE: CPT

## 2017-12-20 PROCEDURE — 85027 COMPLETE CBC AUTOMATED: CPT | Performed by: INTERNAL MEDICINE

## 2017-12-20 RX ORDER — METOPROLOL TARTRATE 5 MG/5ML
5 INJECTION INTRAVENOUS EVERY 6 HOURS PRN
Status: DISCONTINUED | OUTPATIENT
Start: 2017-12-20 | End: 2017-12-20

## 2017-12-20 RX ORDER — AMIODARONE HYDROCHLORIDE 50 MG/ML
INJECTION, SOLUTION INTRAVENOUS
Status: COMPLETED
Start: 2017-12-20 | End: 2017-12-20

## 2017-12-20 RX ORDER — DIGOXIN 0.25 MG/ML
500 INJECTION INTRAMUSCULAR; INTRAVENOUS ONCE
Status: COMPLETED | OUTPATIENT
Start: 2017-12-20 | End: 2017-12-20

## 2017-12-20 RX ORDER — AMIODARONE HYDROCHLORIDE 900 MG/18ML
INJECTION, SOLUTION INTRAVENOUS
Status: COMPLETED
Start: 2017-12-20 | End: 2017-12-20

## 2017-12-20 RX ORDER — AMIODARONE HYDROCHLORIDE 200 MG/1
200 TABLET ORAL
Status: DISCONTINUED | OUTPATIENT
Start: 2017-12-20 | End: 2017-12-21

## 2017-12-20 RX ADMIN — SODIUM CHLORIDE 250 ML: 0.9 INJECTION, SOLUTION INTRAVENOUS at 02:00

## 2017-12-20 RX ADMIN — INSULIN LISPRO 3 UNITS: 100 INJECTION, SOLUTION INTRAVENOUS; SUBCUTANEOUS at 08:44

## 2017-12-20 RX ADMIN — AMIODARONE HYDROCHLORIDE 200 MG: 200 TABLET ORAL at 17:02

## 2017-12-20 RX ADMIN — APIXABAN 5 MG: 5 TABLET, FILM COATED ORAL at 08:45

## 2017-12-20 RX ADMIN — Medication 150 MG: at 06:05

## 2017-12-20 RX ADMIN — ATORVASTATIN CALCIUM 40 MG: 40 TABLET, FILM COATED ORAL at 17:02

## 2017-12-20 RX ADMIN — APIXABAN 5 MG: 5 TABLET, FILM COATED ORAL at 17:02

## 2017-12-20 RX ADMIN — DULOXETINE HYDROCHLORIDE 30 MG: 30 CAPSULE, DELAYED RELEASE ORAL at 08:45

## 2017-12-20 RX ADMIN — METOPROLOL TARTRATE 25 MG: 25 TABLET ORAL at 21:47

## 2017-12-20 RX ADMIN — INSULIN LISPRO 10 UNITS: 100 INJECTION, SOLUTION INTRAVENOUS; SUBCUTANEOUS at 11:56

## 2017-12-20 RX ADMIN — AMIODARONE HYDROCHLORIDE 1 MG/MIN: 50 INJECTION, SOLUTION INTRAVENOUS at 06:23

## 2017-12-20 RX ADMIN — DIGOXIN 500 MCG: 0.25 INJECTION INTRAMUSCULAR; INTRAVENOUS at 08:40

## 2017-12-20 RX ADMIN — INSULIN LISPRO 10 UNITS: 100 INJECTION, SOLUTION INTRAVENOUS; SUBCUTANEOUS at 08:43

## 2017-12-20 RX ADMIN — INSULIN LISPRO 2 UNITS: 100 INJECTION, SOLUTION INTRAVENOUS; SUBCUTANEOUS at 11:57

## 2017-12-20 NOTE — PLAN OF CARE
CARDIOVASCULAR - ADULT     Maintains optimal cardiac output and hemodynamic stability Progressing     Absence of cardiac dysrhythmias or at baseline rhythm Progressing        DISCHARGE PLANNING     Discharge to home or other facility with appropriate resources Progressing        DISCHARGE PLANNING - CARE MANAGEMENT     Discharge to post-acute care or home with appropriate resources Progressing        INFECTION - ADULT     Absence or prevention of progression during hospitalization Progressing     Absence of fever/infection during neutropenic period Progressing        Knowledge Deficit     Patient/family/caregiver demonstrates understanding of disease process, treatment plan, medications, and discharge instructions Progressing        PAIN - ADULT     Verbalizes/displays adequate comfort level or baseline comfort level Progressing        Potential for Falls     Patient will remain free of falls Progressing        Prexisting or High Potential for Compromised Skin Integrity     Skin integrity is maintained or improved Progressing        SAFETY ADULT     Patient will remain free of falls Progressing     Maintain or return to baseline ADL function Progressing     Maintain or return mobility status to optimal level Progressing

## 2017-12-20 NOTE — PHYSICAL THERAPY NOTE
PT order received; attempted to see pt in AM; noted pt's HR in 120s bpm at this time and w/ generally decreased BP; pt is on Amio drip; held PT evaluation/mobilization at that time; nsg informed/concurred; will follow      Suleiman Fracnes, PT

## 2017-12-20 NOTE — CONSULTS
Consultation - Cardiology   Kimberly Coats 78 y o  male MRN: [id number]  Unit/Bed#: -02 Encounter: [id number]    Assessment/Plan     Assessment:  Atrial Fibrillation with RVR    Plan:    He is currently on IV amiodarone and Eliquis  He has chronic diastolic CHF (QK03%) and CKD which limits antiarrhythmic choices  Will start oral amiodarone and schedule CORRINA guided cardioversion tomorrow  History of Present Illness   Physician Requesting Consult: Nuris Serrano MD  Reason for Consult / Principal Problem: Atrial Fibrillation  HPI: Kimberly Coats is a 78y o  year old male who presents with rapid atrial fibrillation  He saw his PCP the day prior to admission and was recommended to go to the hospital  He has a PMHx of Chronic Diastolic CHF and PVD with prior left carotid endarterectomy  He has chronic lower extremity edema and takes diuretics  He does not follow with a cardiologist and seeks his care through the [address]  He current has no chest pain, no dyspnea, no orthopnea and no palpitations  Inpatient consult to Cardiology  Consult performed by: Xena Felton  Consult ordered by: Hailey Dacosta          Review of Systems   Constitutional: Negative  Eyes: Negative  Respiratory: Negative  Cardiovascular: Positive for leg swelling  Gastrointestinal: Negative  Endocrine: Negative  Genitourinary: Negative  Musculoskeletal: Negative  Skin: Negative  Allergic/Immunologic: Negative  Neurological: Negative  Hematological: Negative  Psychiatric/Behavioral: Negative          Historical Information   Past Medical History:   Diagnosis Date    [facility]     Diabetes mellitus [facility]     Hypertension      Past Surgical History:   Procedure Laterality Date    CAROTID ENDARTERECTOMY Left      History   Alcohol Use No     History   Drug Use No     History   Smoking Status    Former Smoker   Smokeless Tobacco    Never Used     Family History:   Family History   Problem Relation Age of Onset    Cancer Mother     Cancer Father        Meds/Allergies   current meds:   Current Facility-Administered Medications   Medication Dose Route Frequency    acetaminophen (TYLENOL) tablet 650 mg  650 mg Oral Q6H PRN    apixaban (ELIQUIS) tablet 5 mg  5 mg Oral BID    atorvastatin (LIPITOR) tablet 40 mg  40 mg Oral Daily With Dinner    DULoxetine (CYMBALTA) delayed release capsule 30 mg  30 mg Oral Daily    insulin detemir (LEVEMIR) subcutaneous injection 50 Units  50 Units Subcutaneous HS    insulin lispro (HumaLOG) 100 units/mL subcutaneous injection 1-5 Units  1-5 Units Subcutaneous TID AC    insulin lispro (HumaLOG) 100 units/mL subcutaneous injection 10 Units  10 Units Subcutaneous TID With Meals    insulin lispro (HumaLOG) 100 units/mL subcutaneous injection 5 Units  5 Units Subcutaneous Daily With Breakfast    metoprolol tartrate (LOPRESSOR) tablet 25 mg  25 mg Oral Q12H DINO    ondansetron (ZOFRAN) injection 4 mg  4 mg Intravenous Q6H PRN    pneumococcal 23-valent polysaccharide vaccine (PNEUMOVAX-23) injection 0 5 mL  0 5 mL Subcutaneous Prior to discharge     Allergies   Allergen Reactions    Motrin [Ibuprofen] GI Intolerance       Objective   Vitals: Blood pressure 126/86, pulse 96, temperature 98 4 °F (36 9 °C), resp  rate 16, height 5' 10" (1 778 m), weight 113 kg (248 lb 7 3 oz), SpO2 94 %    Orthostatic Blood Pressures    Flowsheet Row Most Recent Value   Blood Pressure  126/86 filed at 12/20/2017 1122   Patient Position - Orthostatic VS  Lying filed at 12/20/2017 0403            Intake/Output Summary (Last 24 hours) at 12/20/17 1330  Last data filed at 12/20/17 1122   Gross per 24 hour   Intake                0 ml   Output             1100 ml   Net            -1100 ml       Invasive Devices     Peripheral Intravenous Line            Peripheral IV 12/20/17 Right Forearm less than 1 day                Physical Exam   Constitutional: He is oriented to person, place, and time  No distress  HENT:   Mouth/Throat: No oropharyngeal exudate  Eyes: No scleral icterus  Neck: No JVD present  Cardiovascular: An irregularly irregular rhythm present  Tachycardia present  Pulmonary/Chest: Effort normal  He has no wheezes  He has no rales  Abdominal: Soft  Bowel sounds are normal  He exhibits no distension  There is no tenderness  There is no rebound  Musculoskeletal: He exhibits edema  Neurological: He is alert and oriented to person, place, and time  Skin: Skin is warm  He is not diaphoretic  Lab Results:   I have personally reviewed pertinent lab results      CBC with diff:   Results from last 7 days  Lab Units 12/20/17  0433   WBC Thousand/uL 8 42   RBC Million/uL 4 21   HEMOGLOBIN g/dL 12 3   HEMATOCRIT % 39 4   MCV fL 94   MCH pg 29 2   MCHC g/dL 31 2*   RDW % 14 4   MPV fL 10 9   PLATELETS Thousands/uL 167     CMP:   Results from last 7 days  Lab Units 12/20/17  0433 12/19/17  1301   SODIUM mmol/L 136 138   POTASSIUM mmol/L 4 8 4 9   CHLORIDE mmol/L 103 101   CO2 mmol/L 26 29   ANION GAP mmol/L 7 8   BUN mg/dL 65* 65*   CREATININE mg/dL 2 01* 2 15*   GLUCOSE RANDOM mg/dL 259* 272*   CALCIUM mg/dL 8 3 8 9   AST U/L  --  15   ALT U/L  --  28   ALK PHOS U/L  --  89   TOTAL PROTEIN g/dL  --  6 8   ALBUMIN g/dL  --  3 2*   BILIRUBIN TOTAL mg/dL  --  0 40   EGFR ml/min/1 73sq m 32 29     Troponin:   0  Lab Value Date/Time   TROPONINI 0 07 (H) 12/19/2017 1339   TROPONINI 0 04 (H) 01/30/2017 0056     BNP:   Results from last 7 days  Lab Units 12/20/17  0433   SODIUM mmol/L 136   POTASSIUM mmol/L 4 8   CHLORIDE mmol/L 103   CO2 mmol/L 26   ANION GAP mmol/L 7   BUN mg/dL 65*   CREATININE mg/dL 2 01*   GLUCOSE RANDOM mg/dL 259*   CALCIUM mg/dL 8 3   EGFR ml/min/1 73sq m 32     Coags:   Results from last 7 days  Lab Units 12/19/17  1339   PTT seconds 27   INR  1 04     TSH:   Results from last 7 days  Lab Units 12/19/17  1301   TSH 3RD GENERATON uIU/mL 6 349*     Magnesium:   Results from last 7 days  Lab Units 12/19/17  1301   MAGNESIUM mg/dL 1 9     Lipid Profile:     Imaging: I have personally reviewed pertinent films in PACS  EKG: Atrial Fibrillation  RBBB      Code Status: Level 1 - Full Code  Advance Directive and Living Will:      Power of :    POLST:      Counseling / Coordination of Care  Total floor / unit time spent today 45 minutes  Greater than 50% of total time was spent with the patient and / or family counseling and / or coordination of care  A description of the counseling / coordination of care

## 2017-12-20 NOTE — CASE MANAGEMENT
Initial Clinical Review    Admission: Date/Time/Statement: 12/19/17 @ 1410     Orders Placed This Encounter   Procedures    Inpatient Admission (expected length of stay for this patient is greater than two midnights)     Standing Status:   Standing     Number of Occurrences:   1     Order Specific Question:   Admitting Physician     Answer:   Manuel Mazariegos     Order Specific Question:   Level of Care     Answer:   Level 1 Stepdown [13]     Order Specific Question:   Estimated length of stay     Answer:   More than 2 Midnights     Order Specific Question:   Certification     Answer:   I certify that inpatient services are medically necessary for this patient for a duration of greater than two midnights  See H&P and MD Progress Notes for additional information about the patient's course of treatment  ED: Date/Time/Mode of Arrival:   ED Arrival Information     Expected Arrival Acuity Means of Arrival Escorted By Service Admission Type    - 12/19/2017 11:53 Urgent Walk-In Self General Medicine Urgent    Arrival Complaint    RAPID HEART BEAT          Chief Complaint:   Chief Complaint   Patient presents with    Rapid Heart Rate     Patient presents to ED after going to his [place] clinic yesterday 12/18 where he was advised to go to an ED, patient reports coming here leaving and coming in today for evaluation  History of Illness: Patient had regular follow-up appointment at the [facility] yesterday where he was found to to be in atrial fibrillation  He was advised to go to the ER for evaluation    Patient came to the ER where he was found to be in atrial fibrillation with a heart rate of 140 beats per minute, EKG showed atrial fibrillation with right bundle-branch block      Patient denies any lightheadedness, dizziness, chest pain, shortness of breath, exertional fatigue, palpitations or fluttering in the chest   He thinks that he had a fast heartbeat 6 months ago when he was started on gabapentin from for chronic low back pain due to degenerative disc disease      Patient has a history of TIA or CVA  He denies any signs of bleeding      He does have a history of CHF after eating a lot of salty food for on Memorial Day in 2014  His ejection fraction is 22% with LV diastolic dysfunction according to an echo in the [facility]  He has history of hypertension and he status post left carotid endarterectomy    ED Vital Signs:   ED Triage Vitals   Temperature Pulse Respirations Blood Pressure SpO2   12/19/17 1210 12/19/17 1204 12/19/17 1204 12/19/17 1204 12/19/17 1204   98 4 °F (36 9 °C) (!) 140 19 109/59 96 %      Temp Source Heart Rate Source Patient Position - Orthostatic VS BP Location FiO2 (%)   12/19/17 1210 12/19/17 1204 12/19/17 1204 12/19/17 1204 --   Tympanic Monitor Lying Right arm       Pain Score       12/19/17 1204       No Pain        Wt Readings from Last 1 Encounters:   12/20/17 113 kg (248 lb 7 3 oz)       Vital Signs (abnormal):  Sustained pulse 110 - 140  Maximum respiratory rate 34 - 44  Abnormal Labs/Diagnostic Test Results:   TSH 3rd generation 6 349  Troponin 0 07  UA 1/4% glucose  Bun 65  Creatinine 2 15  Glucose 272  Albumin 3 2  EKG - Atrial fibrillation with rapid ventricular response with premature ventricular or aberrantly conducted complexes  Right bundle branch block  Cannot rule out Inferior infarct (cited on or before 19-DEC-2017)  Abnormal ECG  When compared with ECG of 29-JAN-2017 23:50,  Atrial fibrillation has replaced Sinus rhythm  Vent   rate has increased BY  49 BPM    ED Treatment:   Medication Administration from 12/19/2017 1153 to 12/19/2017 1502       Date/Time Order Dose Route Action Action by Comments     12/19/2017 1350 diltiazem (CARDIZEM) injection 15 mg 15 mg Intravenous Given Aditya Kramer RN  /67     12/19/2017 1400 sodium chloride 0 9 % bolus 500 mL   Intravenous Canceled Entry Eliezer Santos RN           Past Medical/Surgical History: Active Ambulatory Problems     Diagnosis Date Noted    No Active Ambulatory Problems     Resolved Ambulatory Problems     Diagnosis Date Noted    No Resolved Ambulatory Problems     Past Medical History:   Diagnosis Date    [facility]congestive [facility] failure) ([facility] )     Diabetes mellitus ([facility] )     Hypertension        Admitting Diagnosis: Renal insufficiency [N28 9]  Rapid or irregular heartbeat [R00 0]  Rapid atrial fibrillation ([facility] ) [I48 91]    Age/Sex: 78 y o  male    Assessment/Plan: Patient has a for fibrillation with rapid ventricular rate  Unfortunately it is not really known for how long he has been atrial fibrillation  Patient's heart rate is much better after diltiazem 15 mg IV in the ER      Admitting him to the step-down unit  Will continue telemetry monitoring  Will use metoprolol 25 minutes q 6 hours for rate control  His chads Vasc score is at least 4 will start patient on anticoagulation for CVA prophylaxis with Eliquis  Will get TSH, echocardiogram   Will ask Cardiology to see the patient      Patient has chronic diastolic CHF without evidence of volume overload      Will hold patient's amlodipine and lisinopril while on metoprolol as his blood pressures is around 068 systolic      Patient has type 2 diabetes with stage IV chronic disease  His blood sugars are uncontrolled  Will continue Levemir and pre meal Humalog with serial blood sugar measurements  Patient's stage IV chronic disease is at baseline      Patient has chronic neuropathic pain that he attributes to degenerative disc disease of the lumbar sacral spine  He blames gabapentin for the development of atrial fibrillation and wishes to go back to oxycodone that his [place] physicians discontinued    I told patient that I will try Cymbalta        Admission Orders:  12/19/2017  1410 INPATIENT   Scheduled Meds:   apixaban 5 mg Oral BID   atorvastatin 40 mg Oral Daily With Dinner   DULoxetine 30 mg Oral Daily   insulin detemir 50 Units Subcutaneous HS   insulin lispro 1-5 Units Subcutaneous TID AC   insulin lispro 10 Units Subcutaneous TID With Meals   insulin lispro 5 Units Subcutaneous Daily With Breakfast   metoprolol tartrate 25 mg Oral Q12H [address]     Continuous Infusions:   amiodarone 1 mg/min Last Rate: 1 mg/min (12/20/17 0623)     PRN Meds: not used:   acetaminophen    ondansetron    pneumococcal 23-valent polysaccharide vaccine    OTHER ORDERS: fingerstick glucose qid  Consult cardiology  Echo  PT    Brett Roman MD Physician Signed Internal Medicine  H&P Date of Service: 12/19/2017  3:05 PM      Expand All Collapse All    []Hide copied text  []Hover for attribution information  H&P Exam - Olvin Ernandez 78 y o  male MRN: [id number]     Unit/Bed#: -02 Encounter: [id number]           Assessment/Plan         Assessment:        Patient Active Problem List     Diagnosis Date Noted    Atrial fibrillation with rapid ventricular response ([facility] ) 12/19/2017       Priority: A    Type 2 diabetes mellitus with stage 4 chronic kidney disease ([facility] ) 12/19/2017       Priority: B    Essential hypertension 12/19/2017       Priority: C    DDD (degenerative disc disease), lumbosacral 12/19/2017       Priority: D    Hyperlipidemia 12/19/2017    Chronic diastolic congestive heart failure ([facility] ) 12/19/2017            Plan:  Patient has a for fibrillation with rapid ventricular rate  Unfortunately it is not really known for how long he has been atrial fibrillation  Patient's heart rate is much better after diltiazem 15 mg IV in the ER      Admitting him to the step-down unit  Will continue telemetry monitoring  Will use metoprolol 25 minutes q 6 hours for rate control  His chads Vasc score is at least 4 will start patient on anticoagulation for CVA prophylaxis with Eliquis    Will get TSH, echocardiogram   Will ask Cardiology to see the patient      Patient has chronic diastolic CHF without evidence of volume overload      Will hold patient's amlodipine and lisinopril while on metoprolol as his blood pressures is around 856 systolic      Patient has type 2 diabetes with stage IV chronic disease  His blood sugars are uncontrolled  Will continue Levemir and pre meal Humalog with serial blood sugar measurements  Patient's stage IV chronic disease is at baseline      Patient has chronic neuropathic pain that he attributes to degenerative disc disease of the lumbar sacral spine  He blames gabapentin for the development of atrial fibrillation and wishes to go back to oxycodone that his [place] physicians discontinued  I told patient that I will try Cymbalta      Continue  statin for hyperlipidemia      Patient is likely remaining in the hospital for more than 2 midnights      I discussed the case with patient's primary care provider at the [place] dr [facility] at [phone]              History of Present Illness        HPI:  Lucina Medina is a 78 y o  male who presents with chief complaint of I have atrial fibrillation      Patient had irregular follow-up appointment at the [place] clinic yesterday where he was found to to be in atrial fibrillation  He was advised to go to the ER for evaluation  Patient came to the ER where he was found to be in atrial fibrillation with a heart rate of 140 beats per minute, EKG showed atrial fibrillation with right bundle-branch block      Patient denies any lightheadedness, dizziness, chest pain, shortness of breath, exertional fatigue, palpitations or fluttering in the chest   He thinks that he had a fast heartbeat 6 months ago when he was started on gabapentin from for chronic low back pain due to degenerative disc disease      Patient has a history of TIA or CVA  He denies any signs of bleeding      He does have a history of CHF after eating a lot of salty food for on [place] in 2014  His ejection fraction is 53% with LV diastolic dysfunction according to an echo in the [place] system    He has history of hypertension and he status post left carotid endarterectomy         Review of Systems as above and denies headache, change in vision, nausea, abdominal pain, diarrhea, difficulty urinating  His blood sugars are anywhere between 150 and 400  His baseline creatinine runs between 1 7 and 2 8  Currently 2 1         All other ROS are negative         Historical Information     Medical History        Past Medical History:   Diagnosis Date    CHF (congestive heart failure) (HCC)      Diabetes mellitus ([address] )      Hypertension           Surgical History         Past Surgical History:   Procedure Laterality Date    CAROTID ENDARTERECTOMY Left              Social History         History   Alcohol Use No          History   Drug Use No          History   Smoking Status    Former Smoker   Smokeless Tobacco    Never Used      Family History:         Family History   Problem Relation Age of Onset    Cancer Mother      Cancer Father              Meds/Allergies     Prior to Admission Medications   Prescriptions Last Dose Informant Patient Reported? Taking?    amLODIPine (NORVASC) 5 mg tablet     Yes Yes   Sig: Take 5 mg by mouth daily   gabapentin (NEURONTIN) 300 mg capsule     Yes Yes   Sig: Take 100 mg by mouth 3 (three) times a day   insulin aspart (NovoLOG) 100 units/mL injection     Yes Yes   Sig: Inject under the skin 3 (three) times a day before meals   insulin detemir (LEVEMIR) 100 units/mL subcutaneous injection     Yes Yes   Sig: Inject 50 Units under the skin daily at bedtime   lisinopril 20 mg TABS 20 mg, hydrochlorothiazide 12 5 mg TABS 12 5 mg     Yes No   Sig: Take by mouth daily   rosuvastatin (CRESTOR) 40 MG tablet     Yes No   Sig: Take 40 mg by mouth daily       Facility-Administered Medications: None           Allergies   Allergen Reactions    Motrin [Ibuprofen] GI Intolerance            Objective      Vitals: Blood pressure 139/67, pulse (!) 123, temperature 98 4 °F (36 9 °C), temperature source Tympanic, resp  rate 20, height 5' 10" (1 778 m), weight 113 kg (250 lb), SpO2 98 %      No intake or output data in the 24 hours ending 12/19/17 1514         Invasive Devices            Peripheral Intravenous Line                     Peripheral IV 12/19/17 Right Antecubital less than 1 day                      Physical exam     Alert and awake in no acute distress at rest on room air  Head normocephalic, PERRLA   Oral membranes are moist, no pharyngeal exudates seen  Neck supple, no lymphadenopathy  Lungs clear to auscultation bilaterally  Heart irregularly irregular tachycardic, normal heart sounds  Abdomen soft, active bowel sounds, non-tender, non-distended  Extremities: there is no joint effusion or warmth  Skin: warm, no hives seen, no cellulitis seen there is no lower extremity edema or ulcers  Neuro: no facial droop, tongue is midline, strength 5/5 bilateral and equal, speech is normal  Psych: appropriate mood and affect     Lab Results: I have personally reviewed pertinent reports  See below  Imaging: I have personally reviewed pertinent reports  See below  EKG, Pathology, and Other Studies: I have personally reviewed pertinent reports             Current Medications      Current Facility-Administered Medications:     sodium chloride 0 9 % bolus 500 mL, 500 mL, Intravenous, Once                 Admission on 12/19/2017   Component Date Value    Ventricular Rate 12/19/2017 134     Atrial Rate 12/19/2017 141     QRSD Interval 12/19/2017 142     QT Interval 12/19/2017 346     QTC Interval 12/19/2017 516     QRS Axis 12/19/2017 70     T Wave Axis 12/19/2017 23     Sodium 12/19/2017 138     Potassium 12/19/2017 4 9     Chloride 12/19/2017 101     CO2 12/19/2017 29     Anion Gap 12/19/2017 8     BUN 12/19/2017 65*    Creatinine 12/19/2017 2  15*    Glucose 12/19/2017 272*    Calcium 12/19/2017 8 9     AST 12/19/2017 15     ALT 12/19/2017 28     Alkaline Phosphatase 12/19/2017 89     Total Protein 12/19/2017 6 8     Albumin 12/19/2017 3 2*    Total Bilirubin 12/19/2017 0 40     eGFR 12/19/2017 29     WBC 12/19/2017 9 21     RBC 12/19/2017 4 56     Hemoglobin 12/19/2017 13 8     Hematocrit 12/19/2017 42 6     MCV 12/19/2017 93     MCH 12/19/2017 30 3     MCHC 12/19/2017 32 4     RDW 12/19/2017 14 4     MPV 12/19/2017 11 8     Platelets 59/44/1970 182     Neutrophils Relative 12/19/2017 59     Lymphocytes Relative 12/19/2017 21     Monocytes Relative 12/19/2017 12     Eosinophils Relative 12/19/2017 8*    Basophils Relative 12/19/2017 0     Neutrophils Absolute 12/19/2017 5 45     Lymphocytes Absolute 12/19/2017 1 90     Monocytes Absolute 12/19/2017 1 11     Eosinophils Absolute 12/19/2017 0 72*    Basophils Absolute 12/19/2017 0 03     Protime 12/19/2017 13 4     INR 12/19/2017 1 04     PTT 12/19/2017 27     TSH 3RD GENERATON 12/19/2017 6 349*    Magnesium 12/19/2017 1 9     Troponin I 12/19/2017 0 07*    Color, UA 12/19/2017 Yellow     Clarity, UA 12/19/2017 Clear     Specific Gravity, UA 12/19/2017 >=1 030     pH, UA 12/19/2017 5 5     Leukocytes, UA 12/19/2017 Negative     Nitrite, UA 12/19/2017 Negative     Protein, UA 12/19/2017 Negative     Glucose, UA 12/19/2017 250 (1/4%)*    Ketones, UA 12/19/2017 Negative     Urobilinogen, UA 12/19/2017 0 2     Bilirubin, UA 12/19/2017 Negative     Blood, UA 12/19/2017 Negative             No results found

## 2017-12-20 NOTE — SOCIAL WORK
Received call back from Dr Lalo Foy office requesting ECHO, EKG, CBA, CMP and script be faxed to them in order for them to review for approval  Was further informed patient is responsible for the 1st month supply  Await further determination

## 2017-12-20 NOTE — CASE MANAGEMENT
Continued Stay Review    Date: 12/20/2017     Vital Signs: /86   Pulse 96   Temp 98 4 °F (36 9 °C)   Resp 16   Ht 5' 10" (1 778 m)   Wt 113 kg (248 lb 7 3 oz)   SpO2 94%   BMI 35 65 kg/m²     Medications:   Scheduled Meds:   apixaban 5 mg Oral BID   atorvastatin 40 mg Oral Daily With Dinner   DULoxetine 30 mg Oral Daily   insulin detemir 50 Units Subcutaneous HS   insulin lispro 1-5 Units Subcutaneous TID AC   insulin lispro 10 Units Subcutaneous TID With Meals   insulin lispro 5 Units Subcutaneous Daily With Breakfast   metoprolol tartrate 25 mg Oral Q12H [address]     Continuous Infusions:    PRN Meds: not used:   acetaminophen    ondansetron    pneumococcal 23-valent polysaccharide vaccine    Abnormal Labs/Diagnostic Results:   Bun 65  Creatinine 2 01  Glucose 259    Age/Sex: 78 y o  male     Assessment/Plan: Atrial fibrillation with rapid ventricular response (HCC)  Active Problems:    Essential hypertension    Hyperlipidemia    Type 2 diabetes mellitus with stage 4 chronic kidney disease ([facility])    DDD (degenerative disc disease), lumbosacral    Chronic diastolic congestive heart failure ([address] )  Resolved Problems:    * No resolved hospital problems  *    Subclinical hypothyroidism    Plan:  Patient was started on amiodarone last night by advanced practitioner  He has not tolerated meds to control his rate  Will give 1 dose of IV digoxin  Patient is for echo and cardiology consultation  I explained to the patient that he has multiple medical problems other somewhat problematic to manage in the [place] system      Regards to his diabetes, it is poorly controlled this needs work  Patient claims last hemoglobin A1c was 11  Add a defined hemoglobin dose before each meal     Will hold on starting Synthroid for now and to we get his heart rate under control        Discharge Plan:  Home       Sharyn Gill MD Physician Signed Internal Medicine  Progress Notes Date of Service: 12/20/2017  8:24 AM         []Denys copied text  []Saqib for attribution information  Progress Note - Yovany Degree 78 y o  male MRN: [id number]     Unit/Bed#: -02 Encounter: [id number]        Assessment:     Principal Problem:    Atrial fibrillation with rapid ventricular response (HCC)  Active Problems:    Essential hypertension    Hyperlipidemia    Type 2 diabetes mellitus with stage 4 chronic kidney disease (HCC)    DDD (degenerative disc disease), lumbosacral    Chronic diastolic congestive heart failure (HCC)  Resolved Problems:    * No resolved hospital problems  *     Subclinical hypothyroidism  Plan:  Patient was started on amiodarone last night by advanced practitioner  He has not tolerated meds to control his rate  Will give 1 dose of IV digoxin  Patient is for echo and cardiology consultation  I explained to the patient that he has multiple medical problems other somewhat problematic to manage in the [place] system      Regards to his diabetes, it is poorly controlled this needs work  Patient claims last hemoglobin A1c was 11  Add a defined hemoglobin dose before each meal     Will hold on starting Synthroid for now and to we get his heart rate under control      Subjective:   Offers no complaints seems to be enjoying the current television program which he seemed more interested in part taking of rather than conversing with me     Objective:      Vitals: Blood pressure (!) 86/54, pulse 89, temperature 98 °F (36 7 °C), resp  rate 16, height 5' 10" (1 778 m), weight 113 kg (248 lb 7 3 oz), SpO2 95 %  ,Body mass index is 35 65 kg/m²         Intake/Output Summary (Last 24 hours) at 12/20/17 4241  Last data filed at 12/20/17 0001    Gross per 24 hour   Intake                0 ml   Output              500 ml   Net             -500 ml         Physical Exam:    Alert and awake in no acute distress  Head normocephalic, oral membranes are moist   Neck supple, no lymphadenopathy no JVD  Lungs clear to auscultation bilaterally  Heart atrial fibrillation  Abdomen soft, active bowel sounds, non-tender, non-distended  Extremities: no cyanosis, clubbing or edema  Neuro: alert, no facial asymmetry,  strength 5/5 bilateral and equal, speech is normal               Invasive Devices            Peripheral Intravenous Line                     Peripheral IV 12/19/17 Right Antecubital less than 1 day                                     Lab, Imaging and other studies: I have personally reviewed pertinent reports         Results from last 7 days  Lab Units 12/20/17  0433 12/19/17  1301   WBC Thousand/uL 8 42 9 21   HEMOGLOBIN g/dL 12 3 13 8   HEMATOCRIT % 39 4 42 6   PLATELETS Thousands/uL 167 182   NEUTROS PCT %  --  59   LYMPHS PCT %  --  21   MONOS PCT %  --  12   EOS PCT %  --  8*         Results from last 7 days  Lab Units 12/20/17  0433 12/19/17  1301   SODIUM mmol/L 136 138   POTASSIUM mmol/L 4 8 4 9   CHLORIDE mmol/L 103 101   CO2 mmol/L 26 29   BUN mg/dL 65* 65*   CREATININE mg/dL 2 01* 2 15*   CALCIUM mg/dL 8 3 8 9   TOTAL PROTEIN g/dL  --  6 8   BILIRUBIN TOTAL mg/dL  --  0 40   ALK PHOS U/L  --  89   ALT U/L  --  28   AST U/L  --  15   GLUCOSE RANDOM mg/dL 259* 272*            Lab Results   Component Value Date     TROPONINI 0 07 (H) 12/19/2017     TROPONINI 0 04 (H) 01/30/2017         Results from last 7 days  Lab Units 12/19/17  1339   INR   1 04            Lab Results   Component Value Date     URINECX No Growth <1000 cfu/mL 01/29/2017         Imaging:  No results found for this or any previous visit  Results for orders placed during the hospital encounter of 01/29/17   X-ray chest 2 views     Narrative CHEST - DUAL ENERGY     INDICATION:  Dyspnea      COMPARISON:  None     VIEWS:  PA (including soft tissue/bone algorithms) and lateral projections; 4 images     FINDINGS:          Cardiomediastinal silhouette appears unremarkable      Minimal left basilar subsegmental atelectasis identified  No lobar consolidation or interstitial edema  No pleural effusions or pneumothorax      Visualized osseous structures appear within normal limits for the patient's age      Impression Minimal left basilar atelectasis        Workstation performed: [id number]         PATIENT CENTERED ROUNDS: I have performed rounds with the nursing staff      This note has been constructed using a voice recognition system   MD Lisa Diaz MD Physician Signed Cardiology  Consults Date of Service: 12/20/2017  1:30 PM   Consult Orders:   Inpatient consult to Cardiology [[id number]] ordered by Flako Brice MD at 12/19/17 1505      Expand All Collapse All    []Hide copied text  []Hover for attribution information  Consultation - Cardiology   Navi Wagner 78 y o  male MRN: [id number]  Unit/Bed#: -02 Encounter: [id number]        Assessment/Plan         Assessment:  Atrial Fibrillation with RVR     Plan:     He is currently on IV amiodarone and Eliquis  He has chronic diastolic CHF (VO08%) and CKD which limits antiarrhythmic choices  Will start oral amiodarone and schedule CORRINA guided cardioversion tomorrow          History of Present Illness     Physician Requesting Consult: Flako Brice MD  Reason for Consult / Principal Problem: Atrial Fibrillation  HPI: Navi Wagner is a 78y o  year old male who presents with rapid atrial fibrillation  He saw his PCP the day prior to admission and was recommended to go to the hospital  He has a PMHx of Chronic Diastolic CHF and PVD with prior left carotid endarterectomy  He has chronic lower extremity edema and takes diuretics  He does not follow with a cardiologist and seeks his care through the [place]  He current has no chest pain, no dyspnea, no orthopnea and no palpitations       Inpatient consult to Cardiology  Consult performed by: Elliot Win  Consult ordered by: Lolis Naik           Review of Systems   Constitutional: Negative  Eyes: Negative  Respiratory: Negative      Cardiovascular: Positive for leg swelling  Gastrointestinal: Negative  Endocrine: Negative  Genitourinary: Negative  Musculoskeletal: Negative  Skin: Negative  Allergic/Immunologic: Negative  Neurological: Negative  Hematological: Negative      Psychiatric/Behavioral: Negative              Historical Information     Medical History        Past Medical History:   Diagnosis Date    CHF (congestive heart failure) (HCC)      Diabetes mellitus ([address] )      Hypertension           Surgical History         Past Surgical History:   Procedure Laterality Date    CAROTID ENDARTERECTOMY Left               History   Alcohol Use No          History   Drug Use No          History   Smoking Status    Former Smoker   Smokeless Tobacco    Never Used      Family History:         Family History   Problem Relation Age of Onset    Cancer Mother      Cancer Father              Meds/Allergies     current meds:   Current Medications          Current Facility-Administered Medications   Medication Dose Route Frequency    acetaminophen (TYLENOL) tablet 650 mg  650 mg Oral Q6H PRN    apixaban (ELIQUIS) tablet 5 mg  5 mg Oral BID    atorvastatin (LIPITOR) tablet 40 mg  40 mg Oral Daily With Dinner    DULoxetine (CYMBALTA) delayed release capsule 30 mg  30 mg Oral Daily    insulin detemir (LEVEMIR) subcutaneous injection 50 Units  50 Units Subcutaneous HS    insulin lispro (HumaLOG) 100 units/mL subcutaneous injection 1-5 Units  1-5 Units Subcutaneous TID AC    insulin lispro (HumaLOG) 100 units/mL subcutaneous injection 10 Units  10 Units Subcutaneous TID With Meals    insulin lispro (HumaLOG) 100 units/mL subcutaneous injection 5 Units  5 Units Subcutaneous Daily With Breakfast    metoprolol tartrate (LOPRESSOR) tablet 25 mg  25 mg Oral Q12H [facility]    ondansetron (ZOFRAN) injection 4 mg  4 mg Intravenous Q6H PRN    pneumococcal 23-valent polysaccharide vaccine (PNEUMOVAX-23) injection 0 5 mL  0 5 mL Subcutaneous Prior to discharge              Allergies   Allergen Reactions    Motrin [Ibuprofen] GI Intolerance            Objective      Vitals: Blood pressure 126/86, pulse 96, temperature 98 4 °F (36 9 °C), resp  rate 16, height 5' 10" (1 778 m), weight 113 kg (248 lb 7 3 oz), SpO2 94 %  Orthostatic Blood Pressures    Flowsheet Row Most Recent Value   Blood Pressure  126/86 filed at 12/20/2017 1122   Patient Position - Orthostatic VS  Lying filed at 12/20/2017 0403                Intake/Output Summary (Last 24 hours) at 12/20/17 1330  Last data filed at 12/20/17 1122    Gross per 24 hour   Intake                0 ml   Output             1100 ml   Net            -1100 ml             Invasive Devices            Peripheral Intravenous Line                     Peripheral IV 12/20/17 Right Forearm less than 1 day                      Physical Exam   Constitutional: He is oriented to person, place, and time  No distress  HENT:   Mouth/Throat: No oropharyngeal exudate  Eyes: No scleral icterus  Neck: No JVD present  Cardiovascular: An irregularly irregular rhythm present  Tachycardia present  Pulmonary/Chest: Effort normal  He has no wheezes  He has no rales  Abdominal: Soft  Bowel sounds are normal  He exhibits no distension  There is no tenderness  There is no rebound  Musculoskeletal: He exhibits edema  Neurological: He is alert and oriented to person, place, and time  Skin: Skin is warm  He is not diaphoretic          Lab Results:   I have personally reviewed pertinent lab results      CBC with diff:   Results from last 7 days  Lab Units 12/20/17  0433   WBC Thousand/uL 8 42   RBC Million/uL 4 21   HEMOGLOBIN g/dL 12 3   HEMATOCRIT % 39 4   MCV fL 94   MCH pg 29 2   MCHC g/dL 31 2*   RDW % 14 4   MPV fL 10 9   PLATELETS Thousands/uL 167      CMP:   Results from last 7 days  Lab Units 12/20/17  0433 12/19/17  1301   SODIUM mmol/L 136 138   POTASSIUM mmol/L 4 8 4 9   CHLORIDE mmol/L 103 101   CO2 mmol/L 26 29 ANION GAP mmol/L 7 8   BUN mg/dL 65* 65*   CREATININE mg/dL 2 01* 2 15*   GLUCOSE RANDOM mg/dL 259* 272*   CALCIUM mg/dL 8 3 8 9   AST U/L  --  15   ALT U/L  --  28   ALK PHOS U/L  --  89   TOTAL PROTEIN g/dL  --  6 8   ALBUMIN g/dL  --  3 2*   BILIRUBIN TOTAL mg/dL  --  0 40   EGFR ml/min/1 73sq m 32 29      Troponin:   0  Lab Value Date/Time   TROPONINI 0 07 (H) 12/19/2017 1339   TROPONINI 0 04 (H) 01/30/2017 0056      BNP:   Results from last 7 days  Lab Units 12/20/17  0433   SODIUM mmol/L 136   POTASSIUM mmol/L 4 8   CHLORIDE mmol/L 103   CO2 mmol/L 26   ANION GAP mmol/L 7   BUN mg/dL 65*   CREATININE mg/dL 2 01*   GLUCOSE RANDOM mg/dL 259*   CALCIUM mg/dL 8 3   EGFR ml/min/1 73sq m 32      Coags:   Results from last 7 days  Lab Units 12/19/17  1339   PTT seconds 27   INR   1 04      TSH:   Results from last 7 days  Lab Units 12/19/17  1301   TSH 3RD GENERATON uIU/mL 6 349*      Magnesium:   Results from last 7 days  Lab Units 12/19/17  1301   MAGNESIUM mg/dL 1 9      Lipid Profile:     Imaging: I have personally reviewed pertinent films in PACS  EKG: Atrial Fibrillation  RBBB        Code Status: Level 1 - Full Code  Advance Directive and Living Will:      Power of :    POLST:       Counseling / Coordination of Care  Total floor / unit time spent today 45 minutes  Greater than 50% of total time was spent with the patient and / or family counseling and / or coordination of care    A description of the counseling / coordination of care

## 2017-12-20 NOTE — PROGRESS NOTES
Pt approached for BG assessment and administration of Insulin  Pt stated that he already gave himself his own insulin from home  Pt unwilling to surrender his own insulin and adamantly states that he will be administering his own insulin as we are incapable of getting his blood sugars to be below 200  Pt unable to be convinced to let the hospital treat his blood sugars  Dr Sandra Manzanares notified  Orders received to D/C all insulin orders and that pt may administer his own insulin  Pt states that he will be cooperative with BG checks as ordered  Pt states that he is not giving himself any other medications from home  Will monitor

## 2017-12-20 NOTE — SOCIAL WORK
Placed call to [address] [place] [address] and was informed cost of Eliquis would be $400 00 a month  Notified patient of above and he can not afford it  He would be speaking to [place] [facility] the [facility] in the am as he was told"everything would be covered " if he came into the hospital   Provided support

## 2017-12-20 NOTE — SOCIAL WORK
Received script from Dr Zachary Dowell that patient will needs Eliquis when discharged  Met with patient, he informed me he receives his meds thru the [address] in [place] at [phone]  Placed call to them and was transferred to 6 different people and was informed by Harshad Cool that all prescription must be ordered thru patient PCP/[facility] clinic  Met  with patient, placed call to the [facility] at [phone] and attempted to speak with Dr Santy Vargas, was placed on answering machine and left message regarding patient's need for Eliquis  Await call back

## 2017-12-20 NOTE — PHYSICAL THERAPY NOTE
PT order received; chart reviewed; spoke to the pt who reports he has been mobilizing on his own (I) and w/o AD; pt denies any balance issues or mobility deficits at this time and does not anticipate difficulty on the steps at home; overall, pt does not feel he needs to be seen by PT for any mobility skills at this time on level surfaces or elevations and expresses no concerns about going home when medically cleared; based on above, will D/C PT services at this time; pt informed/concurred; nsg and CM notitied;    Aury Garcia, PT

## 2017-12-20 NOTE — SOCIAL WORK
Met with patient, discussed role of Care Management  He informed me he resides with his sister, brother and nephew in a split level home  He has a basement bedroom  He is independent of ADL's and prepares his own meals  He uses crutches at times and has a BGM  He had never had a VNA services   He reports Rehab at the [facility] in 2012 after [facility]  He uses the [address]  for his meds at [phone]  He plans to return home and anticipates no discharge needs

## 2017-12-20 NOTE — PROGRESS NOTES
Progress Note - Shelly Matthews 78 y o  male MRN: [id number]    Unit/Bed#: -02 Encounter: [id number]      Assessment:    Principal Problem:    Atrial fibrillation with rapid ventricular response (HCC)  Active Problems:    Essential hypertension    Hyperlipidemia    Type 2 diabetes mellitus with stage 4 chronic kidney disease (HCC)    DDD (degenerative disc disease), lumbosacral    Chronic diastolic congestive heart failure ([address] )  Resolved Problems:    * No resolved hospital problems  *    Subclinical hypothyroidism  Plan:  Patient was started on amiodarone last night by advanced practitioner  He has not tolerated meds to control his rate  Will give 1 dose of IV digoxin  Patient is for echo and cardiology consultation  I explained to the patient that he has multiple medical problems other somewhat problematic to manage in the [facility] system  Regards to his diabetes, it is poorly controlled this needs work  Patient claims last hemoglobin A1c was 11  Add a defined hemoglobin dose before each meal    Will hold on starting Synthroid for now and to we get his heart rate under control  Subjective:   Offers no complaints seems to be enjoying the current television program which he seemed more interested in part taking of rather than conversing with me    Objective:     Vitals: Blood pressure (!) 86/54, pulse 89, temperature 98 °F (36 7 °C), resp  rate 16, height 5' 10" (1 778 m), weight 113 kg (248 lb 7 3 oz), SpO2 95 %  ,Body mass index is 35 65 kg/m²        Intake/Output Summary (Last 24 hours) at 12/20/17 0445  Last data filed at 12/20/17 0001   Gross per 24 hour   Intake                0 ml   Output              500 ml   Net             -500 ml       Physical Exam:    Alert and awake in no acute distress  Head normocephalic, oral membranes are moist   Neck supple, no lymphadenopathy no JVD  Lungs clear to auscultation bilaterally  Heart atrial fibrillation  Abdomen soft, active bowel sounds, non-tender, non-distended  Extremities: no cyanosis, clubbing or edema  Neuro: alert, no facial asymmetry,  strength 5/5 bilateral and equal, speech is normal        Invasive Devices     Peripheral Intravenous Line            Peripheral IV 12/19/17 Right Antecubital less than 1 day                            Lab, Imaging and other studies: I have personally reviewed pertinent reports  Results from last 7 days  Lab Units 12/20/17  0433 12/19/17  1301   WBC Thousand/uL 8 42 9 21   HEMOGLOBIN g/dL 12 3 13 8   HEMATOCRIT % 39 4 42 6   PLATELETS Thousands/uL 167 182   NEUTROS PCT %  --  59   LYMPHS PCT %  --  21   MONOS PCT %  --  12   EOS PCT %  --  8*       Results from last 7 days  Lab Units 12/20/17  0433 12/19/17  1301   SODIUM mmol/L 136 138   POTASSIUM mmol/L 4 8 4 9   CHLORIDE mmol/L 103 101   CO2 mmol/L 26 29   BUN mg/dL 65* 65*   CREATININE mg/dL 2 01* 2 15*   CALCIUM mg/dL 8 3 8 9   TOTAL PROTEIN g/dL  --  6 8   BILIRUBIN TOTAL mg/dL  --  0 40   ALK PHOS U/L  --  89   ALT U/L  --  28   AST U/L  --  15   GLUCOSE RANDOM mg/dL 259* 272*     Lab Results   Component Value Date    TROPONINI 0 07 (H) 12/19/2017    TROPONINI 0 04 (H) 01/30/2017       Results from last 7 days  Lab Units 12/19/17  1339   INR  1 04     Lab Results   Component Value Date    URINECX No Growth <1000 cfu/mL 01/29/2017       Imaging:  No results found for this or any previous visit  Results for orders placed during the hospital encounter of 01/29/17   X-ray chest 2 views    Narrative CHEST - DUAL ENERGY    INDICATION:  Dyspnea  COMPARISON:  None    VIEWS:  PA (including soft tissue/bone algorithms) and lateral projections; 4 images    FINDINGS:         Cardiomediastinal silhouette appears unremarkable  Minimal left basilar subsegmental atelectasis identified  No lobar consolidation or interstitial edema  No pleural effusions or pneumothorax  Visualized osseous structures appear within normal limits for the patient's age        Impression Minimal left basilar atelectasis  Workstation performed: [id number]         PATIENT CENTERED ROUNDS: I have performed rounds with the nursing staff  This note has been constructed using a voice recognition system      Lisa Nye MD

## 2017-12-21 ENCOUNTER — ANESTHESIA EVENT (INPATIENT)
Dept: NON INVASIVE DIAGNOSTICS | Facility: HOSPITAL | Age: 77
DRG: 309 | End: 2017-12-21
Payer: OTHER GOVERNMENT

## 2017-12-21 LAB
ANION GAP SERPL CALCULATED.3IONS-SCNC: 8 MMOL/L (ref 4–13)
BUN SERPL-MCNC: 49 MG/DL (ref 5–25)
CALCIUM SERPL-MCNC: 8.9 MG/DL (ref 8.3–10.1)
CHLORIDE SERPL-SCNC: 102 MMOL/L (ref 100–108)
CO2 SERPL-SCNC: 26 MMOL/L (ref 21–32)
CREAT SERPL-MCNC: 1.58 MG/DL (ref 0.6–1.3)
GFR SERPL CREATININE-BSD FRML MDRD: 42 ML/MIN/1.73SQ M
GLUCOSE SERPL-MCNC: 145 MG/DL (ref 65–140)
GLUCOSE SERPL-MCNC: 161 MG/DL (ref 65–140)
GLUCOSE SERPL-MCNC: 161 MG/DL (ref 65–140)
GLUCOSE SERPL-MCNC: 166 MG/DL (ref 65–140)
GLUCOSE SERPL-MCNC: 234 MG/DL (ref 65–140)
POTASSIUM SERPL-SCNC: 4.7 MMOL/L (ref 3.5–5.3)
SODIUM SERPL-SCNC: 136 MMOL/L (ref 136–145)

## 2017-12-21 PROCEDURE — 93312 ECHO TRANSESOPHAGEAL: CPT

## 2017-12-21 PROCEDURE — 80048 BASIC METABOLIC PNL TOTAL CA: CPT | Performed by: INTERNAL MEDICINE

## 2017-12-21 PROCEDURE — 82948 REAGENT STRIP/BLOOD GLUCOSE: CPT

## 2017-12-21 RX ORDER — PROPOFOL 10 MG/ML
INJECTION, EMULSION INTRAVENOUS AS NEEDED
Status: DISCONTINUED | OUTPATIENT
Start: 2017-12-21 | End: 2017-12-21

## 2017-12-21 RX ORDER — METOPROLOL SUCCINATE 25 MG/1
25 TABLET, EXTENDED RELEASE ORAL 2 TIMES DAILY
Status: DISCONTINUED | OUTPATIENT
Start: 2017-12-21 | End: 2017-12-21

## 2017-12-21 RX ORDER — METOPROLOL SUCCINATE 50 MG/1
50 TABLET, EXTENDED RELEASE ORAL 2 TIMES DAILY
Status: DISCONTINUED | OUTPATIENT
Start: 2017-12-21 | End: 2017-12-22 | Stop reason: HOSPADM

## 2017-12-21 RX ORDER — PROPOFOL 10 MG/ML
INJECTION, EMULSION INTRAVENOUS AS NEEDED
Status: DISCONTINUED | OUTPATIENT
Start: 2017-12-21 | End: 2017-12-24 | Stop reason: HOSPADM

## 2017-12-21 RX ORDER — LISINOPRIL 2.5 MG/1
2.5 TABLET ORAL DAILY
Status: DISCONTINUED | OUTPATIENT
Start: 2017-12-22 | End: 2017-12-22 | Stop reason: HOSPADM

## 2017-12-21 RX ADMIN — DULOXETINE HYDROCHLORIDE 30 MG: 30 CAPSULE, DELAYED RELEASE ORAL at 09:12

## 2017-12-21 RX ADMIN — ATORVASTATIN CALCIUM 40 MG: 40 TABLET, FILM COATED ORAL at 17:41

## 2017-12-21 RX ADMIN — APIXABAN 5 MG: 5 TABLET, FILM COATED ORAL at 09:12

## 2017-12-21 RX ADMIN — PROPOFOL 40 MG: 10 INJECTION, EMULSION INTRAVENOUS at 14:53

## 2017-12-21 RX ADMIN — METOPROLOL SUCCINATE 50 MG: 50 TABLET, EXTENDED RELEASE ORAL at 17:41

## 2017-12-21 RX ADMIN — METOPROLOL TARTRATE 25 MG: 25 TABLET ORAL at 09:12

## 2017-12-21 RX ADMIN — AMIODARONE HYDROCHLORIDE 200 MG: 200 TABLET ORAL at 12:37

## 2017-12-21 RX ADMIN — PROPOFOL 40 MG: 10 INJECTION, EMULSION INTRAVENOUS at 14:55

## 2017-12-21 RX ADMIN — PROPOFOL 60 MG: 10 INJECTION, EMULSION INTRAVENOUS at 14:57

## 2017-12-21 RX ADMIN — AMIODARONE HYDROCHLORIDE 200 MG: 200 TABLET ORAL at 09:12

## 2017-12-21 RX ADMIN — PROPOFOL 40 MG: 10 INJECTION, EMULSION INTRAVENOUS at 14:51

## 2017-12-21 RX ADMIN — APIXABAN 5 MG: 5 TABLET, FILM COATED ORAL at 17:41

## 2017-12-21 NOTE — ANESTHESIA PREPROCEDURE EVALUATION
Review of Systems/Medical History  Patient summary reviewed  Chart reviewed      Cardiovascular  Hyperlipidemia, Hypertension , CHF ,   Comment: Hx carotid endarterectomy,  Pulmonary  Negative pulmonary ROS ,        GI/Hepatic  Negative GI/hepatic ROS               Endo/Other  Diabetes well controlled type 1 Insulin, Arthritis     GYN       Hematology      Comment: On eliquis Musculoskeletal  Obesity  morbid obesity,        Neurology  Negative neurology ROS      Psychology   Negative psychology ROS            Physical Exam    Airway    Mallampati score: II  TM Distance: >3 FB  Neck ROM: full     Dental   No notable dental hx     Cardiovascular  Rhythm: regular, Rate: normal, Cardiovascular exam normal    Pulmonary  Pulmonary exam normal Breath sounds clear to auscultation,     Other Findings        Anesthesia Plan  ASA Score- 3     Anesthesia Type- IV sedation with anesthesia with ASA Monitors  Additional Monitors:   Airway Plan:         Plan Factors-    Induction- intravenous  Postoperative Plan-     Informed Consent- Anesthetic plan and risks discussed with patient  I personally reviewed this patient with the CRNA  Discussed and agreed on the Anesthesia Plan with the CRNA  Aleksandar Rolon

## 2017-12-21 NOTE — PROGRESS NOTES
CORRINA    LVEF 40% + left atrial appendage thrombus  Recommend rate control and anticoagulation  Can reattempt CORRINA guided cardioversion after 6 weeks of anticoagulation

## 2017-12-21 NOTE — PROGRESS NOTES
Progress Note - Kevyn Davis 78 y o  male MRN: [id number]    Unit/Bed#: -02 Encounter: [id number]      Assessment:  Patient Active Problem List    Diagnosis Date Noted    Atrial fibrillation with rapid ventricular response ([facility] ) 12/19/2017     Priority: A    Type 2 diabetes mellitus with stage 4 chronic kidney disease ([facility] ) 12/19/2017     Priority: B    Essential hypertension 12/19/2017     Priority: C    DDD (degenerative disc disease), lumbosacral 12/19/2017     Priority: D    Hypothyroidism 12/20/2017    Hyperlipidemia 12/19/2017    Chronic diastolic congestive heart failure ([facility] ) 12/19/2017       Plan:  Increase Toprol to 50 mg b i d  for better heart rate control patient's atrial fibrillation  Continue Eliquis for CVA prophylaxis  Will resume lisinopril 2 5 mg dose for his chronic CHF and hypertension  Resume a chest Levemir and pre meal Humalog for treatment of type 2 diabetes with stage IV chronic disease  If heart rates are controlled patient will be discharged tomorrow  Will have to check with case management to see if Eliquis is covered    I discussed the case with Dr Sherry Oliva:   Patient could not be cardioverted as he has a left atrial clot  Patient's heart rate was increased before the procedure  Currently patient denies any chest pain, shortness of breath, palpitations, abdominal pain  Creatinine is 1 5 today and blood sugars were less than 180 since being NPO  All other ROS are negative  Objective:     Vitals: Blood pressure 135/90, pulse 91, temperature 97 8 °F (36 6 °C), temperature source Oral, resp  rate 18, height 5' 10" (1 778 m), weight 113 kg (248 lb 7 3 oz), SpO2 95 %  ,Body mass index is 35 65 kg/m²        Intake/Output Summary (Last 24 hours) at 12/21/17 1630  Last data filed at 12/21/17 1204   Gross per 24 hour   Intake               10 ml   Output             2050 ml   Net            -2040 ml       Physical Exam:    Alert and awake in no acute distress on room air  Head normocephalic, PERRLA   Oral membranes are moist,   Neck supple, no lymphadenopathy  Lungs clear to auscultation bilaterally  Heart irregularly irregular, distant heart sounds  Abdomen soft, active bowel sounds, non-tender, non-distended  Extremities: there is no joint effusion or warmth  Skin: warm, no hives seen, no cellulitis seen there is no pedal edema  Neuro: no facial droop, tongue is midline, strength 5/5 bilateral and equal, speech is normal        Lab, Imaging and other studies: I have personally reviewed pertinent reports  See below         Current Facility-Administered Medications   Medication Dose Route Frequency    acetaminophen (TYLENOL) tablet 650 mg  650 mg Oral Q6H PRN    apixaban (ELIQUIS) tablet 5 mg  5 mg Oral BID    atorvastatin (LIPITOR) tablet 40 mg  40 mg Oral Daily With Dinner    DULoxetine (CYMBALTA) delayed release capsule 30 mg  30 mg Oral Daily    [START ON 12/22/2017] lisinopril (ZESTRIL) tablet 2 5 mg  2 5 mg Oral Daily    metoprolol succinate (TOPROL-XL) 24 hr tablet 50 mg  50 mg Oral BID    ondansetron (ZOFRAN) injection 4 mg  4 mg Intravenous Q6H PRN    pneumococcal 23-valent polysaccharide vaccine (PNEUMOVAX-23) injection 0 5 mL  0 5 mL Subcutaneous Prior to discharge     Facility-Administered Medications Ordered in Other Encounters   Medication Dose Route Frequency    propofol (DIPRIVAN) 200 MG/20ML bolus injection    PRN       Admission on 12/19/2017   Component Date Value    Ventricular Rate 12/19/2017 134     Atrial Rate 12/19/2017 141     QRSD Interval 12/19/2017 142     QT Interval 12/19/2017 346     QTC Interval 12/19/2017 516     QRS Axis 12/19/2017 70     T Wave Axis 12/19/2017 23     Sodium 12/19/2017 138     Potassium 12/19/2017 4 9     Chloride 12/19/2017 101     CO2 12/19/2017 29     Anion Gap 12/19/2017 8     BUN 12/19/2017 65*    Creatinine 12/19/2017 2  15*    Glucose 12/19/2017 272*    Calcium 12/19/2017 8  9     AST 12/19/2017 15     ALT 12/19/2017 28     Alkaline Phosphatase 12/19/2017 89     Total Protein 12/19/2017 6 8     Albumin 12/19/2017 3 2*    Total Bilirubin 12/19/2017 0 40     eGFR 12/19/2017 29     WBC 12/19/2017 9 21     RBC 12/19/2017 4 56     Hemoglobin 12/19/2017 13 8     Hematocrit 12/19/2017 42 6     MCV 12/19/2017 93     MCH 12/19/2017 30 3     MCHC 12/19/2017 32 4     RDW 12/19/2017 14 4     MPV 12/19/2017 11 8     Platelets 09/79/5620 182     Neutrophils Relative 12/19/2017 59     Lymphocytes Relative 12/19/2017 21     Monocytes Relative 12/19/2017 12     Eosinophils Relative 12/19/2017 8*    Basophils Relative 12/19/2017 0     Neutrophils Absolute 12/19/2017 5 45     Lymphocytes Absolute 12/19/2017 1 90     Monocytes Absolute 12/19/2017 1 11     Eosinophils Absolute 12/19/2017 0 72*    Basophils Absolute 12/19/2017 0 03     Protime 12/19/2017 13 4     INR 12/19/2017 1 04     PTT 12/19/2017 27     TSH 3RD GENERATON 12/19/2017 6 349*    Magnesium 12/19/2017 1 9     Troponin I 12/19/2017 0 07*    Color, UA 12/19/2017 Yellow     Clarity, UA 12/19/2017 Clear     Specific Gravity, UA 12/19/2017 >=1 030     pH, UA 12/19/2017 5 5     Leukocytes, UA 12/19/2017 Negative     Nitrite, UA 12/19/2017 Negative     Protein, UA 12/19/2017 Negative     Glucose, UA 12/19/2017 250 (1/4%)*    Ketones, UA 12/19/2017 Negative     Urobilinogen, UA 12/19/2017 0 2     Bilirubin, UA 12/19/2017 Negative     Blood, UA 12/19/2017 Negative     POC Glucose 12/19/2017 278*    POC Glucose 12/19/2017 321*    Sodium 12/20/2017 136     Potassium 12/20/2017 4 8     Chloride 12/20/2017 103     CO2 12/20/2017 26     Anion Gap 12/20/2017 7     BUN 12/20/2017 65*    Creatinine 12/20/2017 2 01*    Glucose 12/20/2017 259*    Calcium 12/20/2017 8 3     eGFR 12/20/2017 32     WBC 12/20/2017 8 42     RBC 12/20/2017 4 21     Hemoglobin 12/20/2017 12 3     Hematocrit 12/20/2017 39 4  MCV 12/20/2017 94     MCH 12/20/2017 29 2     MCHC 12/20/2017 31 2*    RDW 12/20/2017 14 4     Platelets 12/54/5929 167     MPV 12/20/2017 10 9     POC Glucose 12/20/2017 308*    POC Glucose 12/20/2017 256*    POC Glucose 12/20/2017 269*    POC Glucose 12/20/2017 156*    Sodium 12/21/2017 136     Potassium 12/21/2017 4 7     Chloride 12/21/2017 102     CO2 12/21/2017 26     Anion Gap 12/21/2017 8     BUN 12/21/2017 49*    Creatinine 12/21/2017 1 58*    Glucose 12/21/2017 161*    Calcium 12/21/2017 8 9     eGFR 12/21/2017 42     POC Glucose 12/21/2017 166*    POC Glucose 12/21/2017 145*    POC Glucose 12/21/2017 161*       Prior to Admission Medications   Prescriptions Last Dose Informant Patient Reported? Taking?    amLODIPine (NORVASC) 5 mg tablet Unknown at Unknown time  Yes No   Sig: Take 5 mg by mouth daily   gabapentin (NEURONTIN) 300 mg capsule 12/19/2017 at Unknown time  Yes Yes   Sig: Take 100 mg by mouth 3 (three) times a day   insulin aspart (NovoLOG) 100 units/mL injection 12/18/2017 at Unknown time  Yes Yes   Sig: Inject under the skin 3 (three) times a day before meals   insulin detemir (LEVEMIR) 100 units/mL subcutaneous injection 12/19/2017 at Unknown time  Yes Yes   Sig: Inject 50 Units under the skin daily at bedtime   lisinopril 20 mg TABS 20 mg, hydrochlorothiazide 12 5 mg TABS 12 5 mg 12/19/2017 at Unknown time  Yes Yes   Sig: Take by mouth daily   rosuvastatin (CRESTOR) 40 MG tablet 12/19/2017 at Unknown time  Yes Yes   Sig: Take 40 mg by mouth daily      Facility-Administered Medications: None         Pooja Rust MD

## 2017-12-21 NOTE — NURSING NOTE
Patient arrived for CORRINA/CV  Education provided, procedure explained, questions answered  Patient will be monitored by Anesthesia for procedure

## 2017-12-22 VITALS
HEART RATE: 84 BPM | TEMPERATURE: 97.4 F | SYSTOLIC BLOOD PRESSURE: 126 MMHG | RESPIRATION RATE: 18 BRPM | OXYGEN SATURATION: 94 % | WEIGHT: 248.46 LBS | DIASTOLIC BLOOD PRESSURE: 61 MMHG | HEIGHT: 70 IN | BODY MASS INDEX: 35.57 KG/M2

## 2017-12-22 PROBLEM — I51.3 THROMBUS OF LEFT ATRIAL APPENDAGE WITHOUT ANTECEDENT MYOCARDIAL INFARCTION: Status: ACTIVE | Noted: 2017-12-22

## 2017-12-22 LAB — GLUCOSE SERPL-MCNC: 131 MG/DL (ref 65–140)

## 2017-12-22 PROCEDURE — 90732 PPSV23 VACC 2 YRS+ SUBQ/IM: CPT | Performed by: INTERNAL MEDICINE

## 2017-12-22 PROCEDURE — 82948 REAGENT STRIP/BLOOD GLUCOSE: CPT

## 2017-12-22 PROCEDURE — G0009 ADMIN PNEUMOCOCCAL VACCINE: HCPCS | Performed by: INTERNAL MEDICINE

## 2017-12-22 RX ORDER — METOPROLOL SUCCINATE 100 MG/1
100 TABLET, EXTENDED RELEASE ORAL DAILY
Qty: 30 TABLET | Refills: 1 | Status: SHIPPED | OUTPATIENT
Start: 2017-12-22 | End: 2019-01-01 | Stop reason: HOSPADM

## 2017-12-22 RX ORDER — LEVOTHYROXINE SODIUM 0.05 MG/1
50 TABLET ORAL DAILY
Qty: 30 TABLET | Refills: 1 | Status: SHIPPED | OUTPATIENT
Start: 2017-12-22 | End: 2019-01-01 | Stop reason: HOSPADM

## 2017-12-22 RX ORDER — DULOXETIN HYDROCHLORIDE 30 MG/1
30 CAPSULE, DELAYED RELEASE ORAL DAILY
Qty: 30 CAPSULE | Refills: 0 | Status: SHIPPED | OUTPATIENT
Start: 2017-12-22 | End: 2019-01-01 | Stop reason: HOSPADM

## 2017-12-22 RX ORDER — METOPROLOL SUCCINATE 50 MG/1
100 TABLET, EXTENDED RELEASE ORAL DAILY
Qty: 30 TABLET | Refills: 1 | Status: SHIPPED | OUTPATIENT
Start: 2017-12-22 | End: 2017-12-22

## 2017-12-22 RX ORDER — LISINOPRIL 5 MG/1
5 TABLET ORAL DAILY
Qty: 30 TABLET | Refills: 1 | Status: SHIPPED | OUTPATIENT
Start: 2017-12-22 | End: 2019-01-01 | Stop reason: HOSPADM

## 2017-12-22 RX ADMIN — METOPROLOL SUCCINATE 50 MG: 50 TABLET, EXTENDED RELEASE ORAL at 08:11

## 2017-12-22 RX ADMIN — PNEUMOCOCCAL VACCINE POLYVALENT 0.5 ML
25; 25; 25; 25; 25; 25; 25; 25; 25; 25; 25; 25; 25; 25; 25; 25; 25; 25; 25; 25; 25; 25; 25 INJECTION, SOLUTION INTRAMUSCULAR; SUBCUTANEOUS at 08:09

## 2017-12-22 RX ADMIN — APIXABAN 5 MG: 5 TABLET, FILM COATED ORAL at 08:11

## 2017-12-22 RX ADMIN — DULOXETINE HYDROCHLORIDE 30 MG: 30 CAPSULE, DELAYED RELEASE ORAL at 08:11

## 2017-12-22 RX ADMIN — LISINOPRIL 2.5 MG: 2.5 TABLET ORAL at 08:11

## 2017-12-22 NOTE — PLAN OF CARE

## 2017-12-22 NOTE — PLAN OF CARE
CARDIOVASCULAR - ADULT     Maintains optimal cardiac output and hemodynamic stability Adequate for Discharge     Absence of cardiac dysrhythmias or at baseline rhythm Adequate for Discharge        DISCHARGE PLANNING     Discharge to home or other facility with appropriate resources Adequate for Discharge        DISCHARGE PLANNING - CARE MANAGEMENT     Discharge to post-acute care or home with appropriate resources Adequate for Discharge        INFECTION - ADULT     Absence or prevention of progression during hospitalization Adequate for Discharge     Absence of fever/infection during neutropenic period Adequate for Discharge        Knowledge Deficit     Patient/family/caregiver demonstrates understanding of disease process, treatment plan, medications, and discharge instructions Adequate for Discharge        PAIN - ADULT     Verbalizes/displays adequate comfort level or baseline comfort level Adequate for Discharge        Potential for Falls     Patient will remain free of falls Adequate for Discharge        Prexisting or High Potential for Compromised Skin Integrity     Skin integrity is maintained or improved Adequate for Discharge        SAFETY ADULT     Maintain or return to baseline ADL function Adequate for Discharge     Maintain or return mobility status to optimal level Adequate for Discharge

## 2017-12-22 NOTE — CASE MANAGEMENT
Continued Stay Review    Date: 12/21/2017     Vital Signs: /98   Pulse 100   Temp (!) 97 3 °F (36 3 °C) (Tympanic)   Resp 18   Ht 5' 10" (1 778 m)   Wt 113 kg (248 lb 7 3 oz)   SpO2 98%   BMI 35 65 kg/m²     Medications:   Scheduled Meds:   apixaban 5 mg Oral BID   atorvastatin 40 mg Oral Daily With Dinner   DULoxetine 30 mg Oral Daily   [START ON 12/22/2017] lisinopril 2 5 mg Oral Daily   metoprolol succinate 50 mg Oral BID     Continuous Infusions:    PRN Meds: not used:   acetaminophen    ondansetron    pneumococcal 23-valent polysaccharide vaccine    Abnormal Labs/Diagnostic Results:   CORRINA:  LVEF 40% + left atrial appendage thrombus     Recommend rate control and anticoagulation  Can reattempt CORRINA guided cardioversion after 6 weeks of anticoagulation  LEFT VENTRICLE:Systolic function was mildly to moderately reduced by visual assessment  Ejection fraction was estimated to be 40 %  There was mild diffuse hypokinesis  LEFT ATRIUM:The atrium was mildly dilated    LEFT ATRIAL APPENDAGE:The appendage was moderately to markedly dilated  The function was severely reduced (markedly reduced emptying velocity)  There was a small thrombus    ATRIAL SEPTUM:No defect or patent foramen ovale was identified    MITRAL VALVE:There was trace regurgitation    TRICUSPID VALVE:There was trace regurgitation    Serial glucose 166; 145; 161  Bun 49  Creatinine 1 58        Age/Sex: 78 y o  male     Assessment/Plan:   Atrial fibrillation with rapid ventricular response ([facility] [address] ) 12/19/2017        Priority: A    Type 2 diabetes mellitus with stage 4 chronic kidney disease ([facility] [address] ) 12/19/2017       Priority: B    Essential hypertension 12/19/2017       Priority: C    DDD (degenerative disc disease), lumbosacral 12/19/2017       Priority: D    Hypothyroidism 12/20/2017    Hyperlipidemia 12/19/2017    Chronic diastolic congestive heart failure ([address] ) 12/19/2017         Plan:  Increase Toprol to 50 mg b i d  for better heart rate control patient's atrial fibrillation  Continue Eliquis for CVA prophylaxis      Will resume lisinopril 2 5 mg dose for his chronic CHF and hypertension      Resume a chest Levemir and pre meal Humalog for treatment of type 2 diabetes with stage IV chronic disease      If heart rates are controlled patient will be discharged tomorrow  Will have to check with case management to see if Eliquis is covered     I discussed the case with Dr Gee Hairston      Discharge Plan: home  Mirella South MD Physician Signed Internal Medicine  Progress Notes Date of Service: 12/21/2017  4:30 PM      Expand All Collapse All    []Hide copied text  []Hover for attribution information  Progress Note - Kevyn Davis 78 y o  male MRN: [id number]     Unit/Bed#: -02 Encounter: [id number]        Assessment:        Patient Active Problem List     Diagnosis Date Noted    Atrial fibrillation with rapid ventricular response ([facility] [address] ) 12/19/2017       Priority: A    Type 2 diabetes mellitus with stage 4 chronic kidney disease ([facility] [address] ) 12/19/2017       Priority: B    Essential hypertension 12/19/2017       Priority: C    DDD (degenerative disc disease), lumbosacral 12/19/2017       Priority: D    Hypothyroidism 12/20/2017    Hyperlipidemia 12/19/2017    Chronic diastolic congestive heart failure ([facility] [address] ) 12/19/2017         Plan:  Increase Toprol to 50 mg b i d  for better heart rate control patient's atrial fibrillation  Continue Eliquis for CVA prophylaxis      Will resume lisinopril 2 5 mg dose for his chronic CHF and hypertension      Resume a chest Levemir and pre meal Humalog for treatment of type 2 diabetes with stage IV chronic disease      If heart rates are controlled patient will be discharged tomorrow  Will have to check with case management to see if Eliquis is covered     I discussed the case with Dr Gee Hairston     Subjective:   Patient could not be cardioverted as he has a left atrial clot    Patient's heart rate was increased before the procedure      Currently patient denies any chest pain, shortness of breath, palpitations, abdominal pain      Creatinine is 1 5 today and blood sugars were less than 180 since being NPO        All other ROS are negative      Objective:      Vitals: Blood pressure 135/90, pulse 91, temperature 97 8 °F (36 6 °C), temperature source Oral, resp  rate 18, height 5' 10" (1 778 m), weight 113 kg (248 lb 7 3 oz), SpO2 95 %  ,Body mass index is 35 65 kg/m²         Intake/Output Summary (Last 24 hours) at 12/21/17 1630  Last data filed at 12/21/17 1204    Gross per 24 hour   Intake               10 ml   Output             2050 ml   Net            -2040 ml         Physical Exam:     Alert and awake in no acute distress on room air  Head normocephalic, PERRLA   Oral membranes are moist,   Neck supple, no lymphadenopathy  Lungs clear to auscultation bilaterally  Heart irregularly irregular, distant heart sounds  Abdomen soft, active bowel sounds, non-tender, non-distended  Extremities: there is no joint effusion or warmth  Skin: warm, no hives seen, no cellulitis seen there is no pedal edema  Neuro: no facial droop, tongue is midline, strength 5/5 bilateral and equal, speech is normal           Lab, Imaging and other studies: I have personally reviewed pertinent reports   See below            Current Medications          Current Facility-Administered Medications   Medication Dose Route Frequency    acetaminophen (TYLENOL) tablet 650 mg  650 mg Oral Q6H PRN    apixaban (ELIQUIS) tablet 5 mg  5 mg Oral BID    atorvastatin (LIPITOR) tablet 40 mg  40 mg Oral Daily With Dinner    DULoxetine (CYMBALTA) delayed release capsule 30 mg  30 mg Oral Daily    [START ON 12/22/2017] lisinopril (ZESTRIL) tablet 2 5 mg  2 5 mg Oral Daily    metoprolol succinate (TOPROL-XL) 24 hr tablet 50 mg  50 mg Oral BID    ondansetron (ZOFRAN) injection 4 mg  4 mg Intravenous Q6H PRN    pneumococcal 23-valent polysaccharide vaccine (PNEUMOVAX-23) injection 0 5 mL  0 5 mL Subcutaneous Prior to discharge             Facility-Administered Medications Ordered in Other Encounters   Medication Dose Route Frequency    propofol (DIPRIVAN) 200 MG/20ML bolus injection     PRN                  Admission on 12/19/2017   Component Date Value    Ventricular Rate 12/19/2017 134     Atrial Rate 12/19/2017 141     QRSD Interval 12/19/2017 142     QT Interval 12/19/2017 346     QTC Interval 12/19/2017 516     QRS Axis 12/19/2017 70     T Wave Axis 12/19/2017 23     Sodium 12/19/2017 138     Potassium 12/19/2017 4 9     Chloride 12/19/2017 101     CO2 12/19/2017 29     Anion Gap 12/19/2017 8     BUN 12/19/2017 65*    Creatinine 12/19/2017 2  15*    Glucose 12/19/2017 272*    Calcium 12/19/2017 8 9     AST 12/19/2017 15     ALT 12/19/2017 28     Alkaline Phosphatase 12/19/2017 89     Total Protein 12/19/2017 6 8     Albumin 12/19/2017 3 2*    Total Bilirubin 12/19/2017 0 40     eGFR 12/19/2017 29     WBC 12/19/2017 9 21     RBC 12/19/2017 4 56     Hemoglobin 12/19/2017 13 8     Hematocrit 12/19/2017 42 6     MCV 12/19/2017 93     MCH 12/19/2017 30 3     MCHC 12/19/2017 32 4     RDW 12/19/2017 14 4     MPV 12/19/2017 11 8     Platelets 26/39/6085 182     Neutrophils Relative 12/19/2017 59     Lymphocytes Relative 12/19/2017 21     Monocytes Relative 12/19/2017 12     Eosinophils Relative 12/19/2017 8*    Basophils Relative 12/19/2017 0     Neutrophils Absolute 12/19/2017 5 45     Lymphocytes Absolute 12/19/2017 1 90     Monocytes Absolute 12/19/2017 1 11     Eosinophils Absolute 12/19/2017 0 72*    Basophils Absolute 12/19/2017 0 03     Protime 12/19/2017 13 4     INR 12/19/2017 1 04     PTT 12/19/2017 27     TSH 3RD GENERATON 12/19/2017 6 349*    Magnesium 12/19/2017 1 9     Troponin I 12/19/2017 0 07*    Color, UA 12/19/2017 Yellow     Clarity, UA 12/19/2017 Clear     Specific Gravity, UA 12/19/2017 >=1 030     pH, UA 12/19/2017 5 5     Leukocytes, UA 12/19/2017 Negative     Nitrite, UA 12/19/2017 Negative     Protein, UA 12/19/2017 Negative     Glucose, UA 12/19/2017 250 (1/4%)*    Ketones, UA 12/19/2017 Negative     Urobilinogen, UA 12/19/2017 0 2     Bilirubin, UA 12/19/2017 Negative     Blood, UA 12/19/2017 Negative     POC Glucose 12/19/2017 278*    POC Glucose 12/19/2017 321*    Sodium 12/20/2017 136     Potassium 12/20/2017 4 8     Chloride 12/20/2017 103     CO2 12/20/2017 26     Anion Gap 12/20/2017 7     BUN 12/20/2017 65*    Creatinine 12/20/2017 2 01*    Glucose 12/20/2017 259*    Calcium 12/20/2017 8 3     eGFR 12/20/2017 32     WBC 12/20/2017 8 42     RBC 12/20/2017 4 21     Hemoglobin 12/20/2017 12 3     Hematocrit 12/20/2017 39 4     MCV 12/20/2017 94     MCH 12/20/2017 29 2     MCHC 12/20/2017 31 2*    RDW 12/20/2017 14 4     Platelets 19/07/9519 167     MPV 12/20/2017 10 9     POC Glucose 12/20/2017 308*    POC Glucose 12/20/2017 256*    POC Glucose 12/20/2017 269*    POC Glucose 12/20/2017 156*    Sodium 12/21/2017 136     Potassium 12/21/2017 4 7     Chloride 12/21/2017 102     CO2 12/21/2017 26     Anion Gap 12/21/2017 8     BUN 12/21/2017 49*    Creatinine 12/21/2017 1 58*    Glucose 12/21/2017 161*    Calcium 12/21/2017 8 9     eGFR 12/21/2017 42     POC Glucose 12/21/2017 166*    POC Glucose 12/21/2017 145*    POC Glucose 12/21/2017 161*         Prior to Admission Medications   Prescriptions Last Dose Informant Patient Reported? Taking?    amLODIPine (NORVASC) 5 mg tablet Unknown at Unknown time   Yes No   Sig: Take 5 mg by mouth daily   gabapentin (NEURONTIN) 300 mg capsule 12/19/2017 at Unknown time   Yes Yes   Sig: Take 100 mg by mouth 3 (three) times a day   insulin aspart (NovoLOG) 100 units/mL injection 12/18/2017 at Unknown time   Yes Yes   Sig: Inject under the skin 3 (three) times a day before meals   insulin detemir (LEVEMIR) 100 units/mL subcutaneous injection 2017 at Unknown time   Yes Yes   Sig: Inject 50 Units under the skin daily at bedtime   lisinopril 20 mg TABS 20 mg, hydrochlorothiazide 12 5 mg TABS 12 5 mg 2017 at Unknown time   Yes Yes   Sig: Take by mouth daily   rosuvastatin (CRESTOR) 40 MG tablet 2017 at Unknown time   Yes Yes   Sig: Take 40 mg by mouth daily       Facility-Administered Medications: None            Akash Torres MD        CORRINA   Status: Final result   PACS Images     Show images for CORRINA   Order Report      Order Details   Study Result     [address] [facility]  [address], [address]  [phone]     Transesophageal Echocardiogram  2D and Color Doppler     Study date:  21-Dec-2017     Patient: Ghazal Gomez  MR number: [id number]  Account number: [de-identified]  : 1940  Age: 78 years  Gender: Male  Status: Inpatient  Location: Echo lab  Height: 70 in  Weight: 248 lb  BP: 126/ 86 mmHg     Indications: Atrial fibrillation      Diagnoses: I48 0 - Atrial fibrillation     Sonographer:  Jana BHAKTA, RCS  Primary Physician:  Sheri Mitchell  Referring Physician:  Juan Tony MD  Group:  [facility]  Interpreting Physician:  Juan Tony MD     SUMMARY     LEFT VENTRICLE:  Systolic function was mildly to moderately reduced by visual assessment  Ejection fraction was estimated to be 40 %  There was mild diffuse hypokinesis      LEFT ATRIUM:  The atrium was mildly dilated      LEFT ATRIAL APPENDAGE:  The appendage was moderately to markedly dilated  The function was severely reduced (markedly reduced emptying velocity)  There was a small thrombus      ATRIAL SEPTUM:  No defect or patent foramen ovale was identified      MITRAL VALVE:  There was trace regurgitation      TRICUSPID VALVE:  There was trace regurgitation      HISTORY: PRIOR HISTORY: Diastolic CHF, Obesity   Risk factors: hypertension, diabetes, and hypercholesterolemia      PROCEDURE: The procedure was performed in the echo lab  This was a routine study  The risks and alternatives of the procedure were explained to the patient and informed consent was obtained  The transesophageal approach was used  The study  included complete 2D imaging and color Doppler  by the attending cardiologist  Echocardiographic views were limited due to lung interference  Image quality was adequate  There were no complications during the procedure  MEDICATIONS:  Anesthesia      LEFT VENTRICLE: Size was normal  Systolic function was mildly to moderately reduced by visual assessment  Ejection fraction was estimated to be 40 %  There was mild diffuse hypokinesis  Wall thickness was normal      RIGHT VENTRICLE: The size was normal  Systolic function was normal      LEFT ATRIUM: The atrium was mildly dilated  APPENDAGE: The appendage was moderately to markedly dilated  There was a small thrombus  DOPPLER: The function was severely reduced (markedly reduced emptying velocity)      ATRIAL SEPTUM: No defect or patent foramen ovale was identified      RIGHT ATRIUM: The atrium was small      MITRAL VALVE: Valve structure was normal  There was normal leaflet separation  There was no echocardiographic evidence of vegetation  DOPPLER: There was trace regurgitation      AORTIC VALVE: The valve was trileaflet  Leaflets exhibited normal thickness and normal cuspal separation  There was no echocardiographic evidence of vegetation  DOPPLER: There was no regurgitation      TRICUSPID VALVE: The valve structure was normal  There was normal leaflet separation  There was no echocardiographic evidence of vegetation  DOPPLER: There was trace regurgitation      PULMONIC VALVE: Leaflets exhibited normal thickness, no calcification, and normal cuspal separation  There was no echocardiographic evidence of vegetation      PERICARDIUM: There was no pericardial effusion      AORTA: The root exhibited normal size  There was no atheroma  There was no evidence for dissection  There was no evidence for aneurysm      [employer] Accredited Echocardiography Laboratory     Prepared and electronically signed by     Tina Restrepo MD  Signed 21-Dec-2017 16:01:31      Linked Documents     View Image   Imaging     CORRINA (Order #[id number]) on 12/20/2017 - Imaging Information   Result History     CORRINA (Order #[id number]) on 12/21/2017 - Order Result History Report   Signed by     Signed Date/Time  Phone Pager   Klarissa Steen 12/21/2017 16:08 [phone]                      Thank you,  [facility] in the [facility] by [facility] for 2017  Network Utilization Review Department  Phone: [phone]; Fax [phone]  ATTENTION: The Network Utilization Review Department is now centralized for our 7 Facilities  Make a note that we have a new phone and fax numbers for our Department  Please call with any questions or concerns to [phone] and carefully follow the prompts so that you are directed to the right person  All voicemails are confidential  Fax any determinations, approvals, denials, and requests for initial or continue stay review clinical to [phone]  Due to HIGH CALL volume, it would be easier if you could please send faxed requests to expedite your requests and in part, help us provide discharge notifications faster

## 2017-12-22 NOTE — CASE MANAGEMENT
Kya Gorman MD Physician Signed Internal Medicine  Discharge Summaries Date of Service: 12/22/2017  7:31 AM         []Hide copied text  []Saqib for attribution information  Discharge Summary  Adali Castro 78 y o  male MRN: [id number]  Unit/Bed#: -02 Encounter: [id number]     Admission Date: 12/19/2017      Discharge Date:  12/22/2017     Admitting Diagnosis: Renal insufficiency [N28 9]  Rapid or irregular heartbeat [R00 0]  Rapid atrial fibrillation ([address] ) [I48 91]     HPI:  See history and physical     Procedures Performed:       Orders Placed This Encounter   Procedures   [address] ED ECG Documentation Only         Hospital Course:  Patient is a gentleman who is normally followed by the [facility]  Patient was found to be in rapid atrial fibrillation by his [address]  Patient received approval to be treated at this hospital and showed up 2 days later  The patient was in rapid AFib  His blood pressure was somewhat soft but eventually by stopping his normal blood pressure medications and adding beta-blocker since rate became controlled in the 80s  He had a CORRINA directed attempted cardioversion, but this was aborted was found that he had a clot is left atrial appendage  It was felt that the patient probably been in atrial fibrillation for some time  Thus he was treated with anticoagulation and rate control    He will be followed by the [place] and should have a Re attempted CORRINA directed cardioversion in about 6 weeks      Patient was also found to have be mildly hypothyroid and was given Synthroid he should have repeat TSH in 3 months      His diabetes initially was under poor control but with appropriate diet this remained reasonably well controlled at time of discharge      The patient was discharged stable condition      Significant Findings, Care, Treatment and Services Provided:  CORRINA     Discharge Diagnosis: Principal Problem:    Atrial fibrillation with rapid ventricular response ([facility] [address] )  Active Problems:    Essential hypertension    Hyperlipidemia    Type 2 diabetes mellitus with stage 4 chronic kidney disease (HCC)    DDD (degenerative disc disease), lumbosacral    Chronic diastolic congestive heart failure (HCC)    Hypothyroidism    Thrombus of left atrial appendage without antecedent myocardial infarction  Resolved Problems:    * No resolved hospital problems   *           Condition at Discharge: stable      Discharge instructions/Information to patient and family:   See after visit summary for information provided to patient and family        Provisions for Follow-Up Care:  See after visit summary for information related to follow-up care and any pertinent home health orders        Disposition: Home     Discharge Medications:  See after visit summary for reconciled discharge medications provided to patient and family        This note has been constructed using a voice recognition system          Lisa Nye MD

## 2017-12-22 NOTE — DISCHARGE SUMMARY
Discharge Summary  Sachin Toth 78 y o  male MRN: [id number]  Unit/Bed#: -02 Encounter: [id number]    Admission Date: 12/19/2017     Discharge Date:  12/22/2017    Admitting Diagnosis: Renal insufficiency [N28 9]  Rapid or irregular heartbeat [R00 0]  Rapid atrial fibrillation (HCC) [I48 91]    HPI:  See history and physical    Procedures Performed:   Orders Placed This Encounter   Procedures   [address] ED ECG Documentation Only       Hospital Course:  Patient is a gentleman who is normally followed by the [facility]  Patient was found to be in rapid atrial fibrillation by his [address]  Patient received approval to be treated at this hospital and showed up 2 days later  The patient was in rapid AFib  His blood pressure was somewhat soft but eventually by stopping his normal blood pressure medications and adding beta-blocker since rate became controlled in the 80s  He had a CORRINA directed attempted cardioversion, but this was aborted was found that he had a clot is left atrial appendage  It was felt that the patient probably been in atrial fibrillation for some time  Thus he was treated with anticoagulation and rate control  He will be followed by the [place] and should have a Re attempted CORRINA directed cardioversion in about 6 weeks  Patient was also found to have be mildly hypothyroid and was given Synthroid he should have repeat TSH in 3 months  His diabetes initially was under poor control but with appropriate diet this remained reasonably well controlled at time of discharge  The patient was discharged stable condition      Significant Findings, Care, Treatment and Services Provided:  CORRINA    Discharge Diagnosis: Principal Problem:    Atrial fibrillation with rapid ventricular response (HCC)  Active Problems:    Essential hypertension    Hyperlipidemia    Type 2 diabetes mellitus with stage 4 chronic kidney disease (HCC)    DDD (degenerative disc disease), lumbosacral    Chronic diastolic congestive heart failure (HCC)    Hypothyroidism    Thrombus of left atrial appendage without antecedent myocardial infarction  Resolved Problems:    * No resolved hospital problems  *        Condition at Discharge: stable     Discharge instructions/Information to patient and family:   See after visit summary for information provided to patient and family  Provisions for Follow-Up Care:  See after visit summary for information related to follow-up care and any pertinent home health orders  Disposition: Home    Discharge Medications:  See after visit summary for reconciled discharge medications provided to patient and family  This note has been constructed using a voice recognition system         Whit Antonio MD

## 2017-12-22 NOTE — SOCIAL WORK
Patient for discharge today to home with no services  He is on Eliquis and the [place] will not cover it until he sees his PCP with the [place] in [facility]  Notified Dr Ileana Randhawa that patient will not be obtaining the Eliquis, Dr Ileana Randhawa provided 3 week sample supply for patient until he can be seen by the [place]  Was also informed Toprol and Lisinopril would be new  Searched on [facility] and the cost of those meds will be about $19 00 for the month  Notified patient and instructed hm to see his PCP with the [place] soon

## 2018-01-02 NOTE — ANESTHESIA PREPROCEDURE EVALUATION
Review of Systems/Medical History  Patient summary reviewed  Chart reviewed      Cardiovascular  Hyperlipidemia, Hypertension , CHF ,   Comment: Hx carotid endarterectomy,  Pulmonary  Negative pulmonary ROS ,        GI/Hepatic  Negative GI/hepatic ROS               Endo/Other  Diabetes well controlled type 1 Insulin, Arthritis     GYN       Hematology      Comment: On eliquis Musculoskeletal  Obesity  morbid obesity,        Neurology  Negative neurology ROS      Psychology   Negative psychology ROS            Physical Exam    Airway    Mallampati score: II  TM Distance: >3 FB  Neck ROM: full     Dental   No notable dental hx     Cardiovascular  Rhythm: regular, Rate: normal, Cardiovascular exam normal    Pulmonary  Pulmonary exam normal Breath sounds clear to auscultation,     Other Findings        Anesthesia Plan  ASA Score- 3     Anesthesia Type- IV sedation with anesthesia with ASA Monitors  Additional Monitors:   Airway Plan:         Plan Factors-    Induction- intravenous  Postoperative Plan-     Informed Consent- Anesthetic plan and risks discussed with patient  I personally reviewed this patient with the CRNA  Discussed and agreed on the Anesthesia Plan with the CRNA  Christiano Altamirano

## 2018-01-04 NOTE — ANESTHESIA POSTPROCEDURE EVALUATION
Post-Op Assessment Note      CV Status:  Stable    Mental Status:  Alert and awake    Hydration Status:  Euvolemic    PONV Controlled:  None    Airway Patency:  Patent    Post Op Vitals Reviewed: Yes          Staff: Anesthesiologist           BP      Temp     Pulse     Resp      SpO2

## 2018-01-04 NOTE — ADDENDUM NOTE
Addendum  created 01/04/18 5486 by Liz Hodgkin, DO    Anesthesia Event edited, Anesthesia Review and Sign - Ready for Procedure

## 2018-01-05 ENCOUNTER — GENERIC CONVERSION - ENCOUNTER (OUTPATIENT)
Dept: OTHER | Facility: OTHER | Age: 78
End: 2018-01-05

## 2019-01-01 ENCOUNTER — ANESTHESIA EVENT (INPATIENT)
Dept: PERIOP | Facility: HOSPITAL | Age: 79
DRG: 252 | End: 2019-01-01
Payer: MEDICARE

## 2019-01-01 ENCOUNTER — ANESTHESIA (OUTPATIENT)
Dept: PERIOP | Facility: HOSPITAL | Age: 79
End: 2019-01-01

## 2019-01-01 ENCOUNTER — APPOINTMENT (EMERGENCY)
Dept: RADIOLOGY | Facility: HOSPITAL | Age: 79
End: 2019-01-01
Payer: MEDICARE

## 2019-01-01 ENCOUNTER — ANESTHESIA (INPATIENT)
Dept: PERIOP | Facility: HOSPITAL | Age: 79
DRG: 252 | End: 2019-01-01
Payer: MEDICARE

## 2019-01-01 ENCOUNTER — HOSPITAL ENCOUNTER (INPATIENT)
Facility: HOSPITAL | Age: 79
LOS: 8 days | DRG: 252 | End: 2019-02-12
Attending: SURGERY | Admitting: SURGERY
Payer: MEDICARE

## 2019-01-01 ENCOUNTER — ANESTHESIA EVENT (OUTPATIENT)
Dept: PERIOP | Facility: HOSPITAL | Age: 79
End: 2019-01-01

## 2019-01-01 ENCOUNTER — APPOINTMENT (INPATIENT)
Dept: NON INVASIVE DIAGNOSTICS | Facility: HOSPITAL | Age: 79
DRG: 252 | End: 2019-01-01
Payer: MEDICARE

## 2019-01-01 ENCOUNTER — APPOINTMENT (EMERGENCY)
Dept: CT IMAGING | Facility: HOSPITAL | Age: 79
End: 2019-01-01
Payer: MEDICARE

## 2019-01-01 ENCOUNTER — HOSPITAL ENCOUNTER (EMERGENCY)
Facility: HOSPITAL | Age: 79
End: 2019-02-04
Attending: EMERGENCY MEDICINE | Admitting: EMERGENCY MEDICINE
Payer: MEDICARE

## 2019-01-01 ENCOUNTER — APPOINTMENT (EMERGENCY)
Dept: NON INVASIVE DIAGNOSTICS | Facility: HOSPITAL | Age: 79
End: 2019-01-01
Payer: MEDICARE

## 2019-01-01 VITALS
RESPIRATION RATE: 16 BRPM | DIASTOLIC BLOOD PRESSURE: 73 MMHG | WEIGHT: 283.29 LBS | HEIGHT: 70 IN | SYSTOLIC BLOOD PRESSURE: 133 MMHG | OXYGEN SATURATION: 93 % | TEMPERATURE: 99.2 F | BODY MASS INDEX: 40.56 KG/M2 | HEART RATE: 150 BPM

## 2019-01-01 VITALS
HEIGHT: 70 IN | HEART RATE: 75 BPM | DIASTOLIC BLOOD PRESSURE: 62 MMHG | RESPIRATION RATE: 15 BRPM | SYSTOLIC BLOOD PRESSURE: 179 MMHG | BODY MASS INDEX: 37.65 KG/M2 | OXYGEN SATURATION: 93 % | WEIGHT: 263 LBS | TEMPERATURE: 99.7 F

## 2019-01-01 DIAGNOSIS — I99.8 ISCHEMIA OF BOTH LOWER EXTREMITIES: ICD-10-CM

## 2019-01-01 DIAGNOSIS — I74.3 ACUTE OCCLUSION OF ARTERY OF LOWER EXTREMITY DUE TO THROMBOSIS (HCC): ICD-10-CM

## 2019-01-01 DIAGNOSIS — I99.8 ISCHEMIA OF BOTH LOWER EXTREMITIES: Primary | ICD-10-CM

## 2019-01-01 DIAGNOSIS — I82.90 OCCLUSIVE THROMBUS: ICD-10-CM

## 2019-01-01 DIAGNOSIS — I48.91 ATRIAL FIBRILLATION WITH RAPID VENTRICULAR RESPONSE (HCC): Primary | ICD-10-CM

## 2019-01-01 LAB
ABO GROUP BLD BPU: NORMAL
ABO GROUP BLD: NORMAL
ABO GROUP BLD: NORMAL
ALBUMIN SERPL BCP-MCNC: 2.6 G/DL (ref 3.5–5)
ALP SERPL-CCNC: 63 U/L (ref 46–116)
ALT SERPL W P-5'-P-CCNC: 34 U/L (ref 12–78)
ANION GAP BLD CALC-SCNC: 18 MMOL/L (ref 4–13)
ANION GAP SERPL CALCULATED.3IONS-SCNC: 10 MMOL/L (ref 4–13)
ANION GAP SERPL CALCULATED.3IONS-SCNC: 14 MMOL/L (ref 4–13)
ANION GAP SERPL CALCULATED.3IONS-SCNC: 6 MMOL/L (ref 4–13)
ANION GAP SERPL CALCULATED.3IONS-SCNC: 7 MMOL/L (ref 4–13)
ANION GAP SERPL CALCULATED.3IONS-SCNC: 8 MMOL/L (ref 4–13)
ANION GAP SERPL CALCULATED.3IONS-SCNC: 9 MMOL/L (ref 4–13)
ANISOCYTOSIS BLD QL SMEAR: PRESENT
APTT PPP: 100 SECONDS (ref 26–38)
APTT PPP: 110 SECONDS (ref 26–38)
APTT PPP: 113 SECONDS (ref 26–38)
APTT PPP: 123 SECONDS (ref 26–38)
APTT PPP: 149 SECONDS (ref 26–38)
APTT PPP: 160 SECONDS (ref 26–38)
APTT PPP: 28 SECONDS (ref 26–38)
APTT PPP: 47 SECONDS (ref 26–38)
APTT PPP: 50 SECONDS (ref 26–38)
APTT PPP: 53 SECONDS (ref 26–38)
APTT PPP: 58 SECONDS (ref 26–38)
APTT PPP: 59 SECONDS (ref 26–38)
APTT PPP: 59 SECONDS (ref 26–38)
APTT PPP: 66 SECONDS (ref 26–38)
APTT PPP: 75 SECONDS (ref 26–38)
APTT PPP: 75 SECONDS (ref 26–38)
APTT PPP: 79 SECONDS (ref 26–38)
APTT PPP: 82 SECONDS (ref 26–38)
APTT PPP: >210 SECONDS (ref 26–38)
APTT PPP: >210 SECONDS (ref 26–38)
AST SERPL W P-5'-P-CCNC: 211 U/L (ref 5–45)
ATRIAL RATE: 112 BPM
ATRIAL RATE: 134 BPM
ATRIAL RATE: 75 BPM
ATRIAL RATE: 77 BPM
BACTERIA BLD CULT: NORMAL
BACTERIA BLD CULT: NORMAL
BACTERIA UR QL AUTO: ABNORMAL /HPF
BASE EXCESS BLDA CALC-SCNC: -0.2 MMOL/L
BASE EXCESS BLDA CALC-SCNC: 10 MMOL/L (ref -2–3)
BASOPHILS # BLD AUTO: 0.03 THOUSANDS/ΜL (ref 0–0.1)
BASOPHILS # BLD AUTO: 0.04 THOUSANDS/ΜL (ref 0–0.1)
BASOPHILS # BLD AUTO: 0.05 THOUSANDS/ΜL (ref 0–0.1)
BASOPHILS # BLD MANUAL: 0 THOUSAND/UL (ref 0–0.1)
BASOPHILS NFR BLD AUTO: 0 % (ref 0–1)
BASOPHILS NFR MAR MANUAL: 0 % (ref 0–1)
BILIRUB SERPL-MCNC: 0.78 MG/DL (ref 0.2–1)
BILIRUB UR QL STRIP: NEGATIVE
BLD GP AB SCN SERPL QL: NEGATIVE
BLD GP AB SCN SERPL QL: NEGATIVE
BPU ID: NORMAL
BUN BLD-MCNC: 39 MG/DL (ref 5–25)
BUN SERPL-MCNC: 31 MG/DL (ref 5–25)
BUN SERPL-MCNC: 35 MG/DL (ref 5–25)
BUN SERPL-MCNC: 36 MG/DL (ref 5–25)
BUN SERPL-MCNC: 38 MG/DL (ref 5–25)
BUN SERPL-MCNC: 38 MG/DL (ref 5–25)
BUN SERPL-MCNC: 39 MG/DL (ref 5–25)
BUN SERPL-MCNC: 41 MG/DL (ref 5–25)
BUN SERPL-MCNC: 42 MG/DL (ref 5–25)
BUN SERPL-MCNC: 43 MG/DL (ref 5–25)
BUN SERPL-MCNC: 44 MG/DL (ref 5–25)
BUN SERPL-MCNC: 44 MG/DL (ref 5–25)
BUN SERPL-MCNC: 45 MG/DL (ref 5–25)
BUN SERPL-MCNC: 46 MG/DL (ref 5–25)
BUN SERPL-MCNC: 47 MG/DL (ref 5–25)
CA-I BLD-SCNC: 0.96 MMOL/L (ref 1.12–1.32)
CA-I BLD-SCNC: 1.1 MMOL/L (ref 1.12–1.32)
CA-I BLD-SCNC: 1.14 MMOL/L (ref 1.12–1.32)
CALCIUM SERPL-MCNC: 7 MG/DL (ref 8.3–10.1)
CALCIUM SERPL-MCNC: 7.1 MG/DL (ref 8.3–10.1)
CALCIUM SERPL-MCNC: 7.1 MG/DL (ref 8.3–10.1)
CALCIUM SERPL-MCNC: 7.2 MG/DL (ref 8.3–10.1)
CALCIUM SERPL-MCNC: 7.2 MG/DL (ref 8.3–10.1)
CALCIUM SERPL-MCNC: 7.3 MG/DL (ref 8.3–10.1)
CALCIUM SERPL-MCNC: 7.4 MG/DL (ref 8.3–10.1)
CALCIUM SERPL-MCNC: 7.5 MG/DL (ref 8.3–10.1)
CALCIUM SERPL-MCNC: 7.6 MG/DL (ref 8.3–10.1)
CALCIUM SERPL-MCNC: 7.8 MG/DL (ref 8.3–10.1)
CALCIUM SERPL-MCNC: 7.8 MG/DL (ref 8.3–10.1)
CALCIUM SERPL-MCNC: 8.1 MG/DL (ref 8.3–10.1)
CALCIUM SERPL-MCNC: 8.5 MG/DL (ref 8.3–10.1)
CALCIUM SERPL-MCNC: 9.6 MG/DL (ref 8.3–10.1)
CHLORIDE BLD-SCNC: 88 MMOL/L (ref 100–108)
CHLORIDE SERPL-SCNC: 100 MMOL/L (ref 100–108)
CHLORIDE SERPL-SCNC: 100 MMOL/L (ref 100–108)
CHLORIDE SERPL-SCNC: 101 MMOL/L (ref 100–108)
CHLORIDE SERPL-SCNC: 102 MMOL/L (ref 100–108)
CHLORIDE SERPL-SCNC: 103 MMOL/L (ref 100–108)
CHLORIDE SERPL-SCNC: 103 MMOL/L (ref 100–108)
CHLORIDE SERPL-SCNC: 105 MMOL/L (ref 100–108)
CHLORIDE SERPL-SCNC: 92 MMOL/L (ref 100–108)
CHLORIDE SERPL-SCNC: 96 MMOL/L (ref 100–108)
CHLORIDE SERPL-SCNC: 97 MMOL/L (ref 100–108)
CHLORIDE SERPL-SCNC: 98 MMOL/L (ref 100–108)
CHLORIDE SERPL-SCNC: 99 MMOL/L (ref 100–108)
CK MB SERPL-MCNC: 12.9 NG/ML (ref 0–5)
CK MB SERPL-MCNC: 16.7 NG/ML (ref 0–5)
CK MB SERPL-MCNC: 20.8 NG/ML (ref 0–5)
CK MB SERPL-MCNC: 28.6 NG/ML (ref 0–5)
CK MB SERPL-MCNC: 29.2 NG/ML (ref 0–5)
CK MB SERPL-MCNC: 32.7 NG/ML (ref 0–5)
CK MB SERPL-MCNC: 34.9 NG/ML (ref 0–5)
CK MB SERPL-MCNC: 35.9 NG/ML (ref 0–5)
CK MB SERPL-MCNC: 42.7 NG/ML (ref 0–5)
CK MB SERPL-MCNC: 45.2 NG/ML (ref 0–5)
CK MB SERPL-MCNC: 45.9 NG/ML (ref 0–5)
CK MB SERPL-MCNC: 50.5 NG/ML (ref 0–5)
CK MB SERPL-MCNC: 55.2 NG/ML (ref 0–5)
CK MB SERPL-MCNC: 64.5 NG/ML (ref 0–5)
CK MB SERPL-MCNC: <1 % (ref 0–2.5)
CK SERPL-CCNC: 7525 U/L (ref 39–308)
CK SERPL-CCNC: 9175 U/L (ref 39–308)
CK SERPL-CCNC: 9216 U/L (ref 39–308)
CK SERPL-CCNC: ABNORMAL U/L (ref 39–308)
CLARITY UR: ABNORMAL
CO2 SERPL-SCNC: 25 MMOL/L (ref 21–32)
CO2 SERPL-SCNC: 26 MMOL/L (ref 21–32)
CO2 SERPL-SCNC: 27 MMOL/L (ref 21–32)
CO2 SERPL-SCNC: 28 MMOL/L (ref 21–32)
CO2 SERPL-SCNC: 29 MMOL/L (ref 21–32)
CO2 SERPL-SCNC: 30 MMOL/L (ref 21–32)
CO2 SERPL-SCNC: 33 MMOL/L (ref 21–32)
COARSE GRAN CASTS URNS QL MICRO: ABNORMAL /LPF
COLOR UR: YELLOW
CREAT BLD-MCNC: 1.5 MG/DL (ref 0.6–1.3)
CREAT SERPL-MCNC: 1.5 MG/DL (ref 0.6–1.3)
CREAT SERPL-MCNC: 1.51 MG/DL (ref 0.6–1.3)
CREAT SERPL-MCNC: 1.54 MG/DL (ref 0.6–1.3)
CREAT SERPL-MCNC: 1.55 MG/DL (ref 0.6–1.3)
CREAT SERPL-MCNC: 1.68 MG/DL (ref 0.6–1.3)
CREAT SERPL-MCNC: 1.76 MG/DL (ref 0.6–1.3)
CREAT SERPL-MCNC: 1.77 MG/DL (ref 0.6–1.3)
CREAT SERPL-MCNC: 1.77 MG/DL (ref 0.6–1.3)
CREAT SERPL-MCNC: 1.78 MG/DL (ref 0.6–1.3)
CREAT SERPL-MCNC: 1.85 MG/DL (ref 0.6–1.3)
CREAT SERPL-MCNC: 1.94 MG/DL (ref 0.6–1.3)
CREAT SERPL-MCNC: 1.97 MG/DL (ref 0.6–1.3)
CREAT SERPL-MCNC: 2.07 MG/DL (ref 0.6–1.3)
CREAT SERPL-MCNC: 2.1 MG/DL (ref 0.6–1.3)
CREAT SERPL-MCNC: 2.12 MG/DL (ref 0.6–1.3)
CREAT SERPL-MCNC: 2.18 MG/DL (ref 0.6–1.3)
CROSSMATCH: NORMAL
CROSSMATCH: NORMAL
EOSINOPHIL # BLD AUTO: 0.01 THOUSAND/ΜL (ref 0–0.61)
EOSINOPHIL # BLD AUTO: 0.01 THOUSAND/ΜL (ref 0–0.61)
EOSINOPHIL # BLD AUTO: 0.04 THOUSAND/ΜL (ref 0–0.61)
EOSINOPHIL # BLD AUTO: 0.04 THOUSAND/ΜL (ref 0–0.61)
EOSINOPHIL # BLD AUTO: 0.34 THOUSAND/ΜL (ref 0–0.61)
EOSINOPHIL # BLD MANUAL: 0 THOUSAND/UL (ref 0–0.4)
EOSINOPHIL NFR BLD AUTO: 0 % (ref 0–6)
EOSINOPHIL NFR BLD AUTO: 3 % (ref 0–6)
EOSINOPHIL NFR BLD MANUAL: 0 % (ref 0–6)
ERYTHROCYTE [DISTWIDTH] IN BLOOD BY AUTOMATED COUNT: 13.5 % (ref 11.6–15.1)
ERYTHROCYTE [DISTWIDTH] IN BLOOD BY AUTOMATED COUNT: 13.5 % (ref 11.6–15.1)
ERYTHROCYTE [DISTWIDTH] IN BLOOD BY AUTOMATED COUNT: 13.8 % (ref 11.6–15.1)
ERYTHROCYTE [DISTWIDTH] IN BLOOD BY AUTOMATED COUNT: 13.9 % (ref 11.6–15.1)
ERYTHROCYTE [DISTWIDTH] IN BLOOD BY AUTOMATED COUNT: 14 % (ref 11.6–15.1)
ERYTHROCYTE [DISTWIDTH] IN BLOOD BY AUTOMATED COUNT: 14.1 % (ref 11.6–15.1)
ERYTHROCYTE [DISTWIDTH] IN BLOOD BY AUTOMATED COUNT: 14.3 % (ref 11.6–15.1)
ERYTHROCYTE [DISTWIDTH] IN BLOOD BY AUTOMATED COUNT: 14.3 % (ref 11.6–15.1)
ERYTHROCYTE [DISTWIDTH] IN BLOOD BY AUTOMATED COUNT: 14.6 % (ref 11.6–15.1)
EST. AVERAGE GLUCOSE BLD GHB EST-MCNC: 272 MG/DL
FINE GRAN CASTS URNS QL MICRO: ABNORMAL /LPF
GFR SERPL CREATININE-BSD FRML MDRD: 28 ML/MIN/1.73SQ M
GFR SERPL CREATININE-BSD FRML MDRD: 29 ML/MIN/1.73SQ M
GFR SERPL CREATININE-BSD FRML MDRD: 30 ML/MIN/1.73SQ M
GFR SERPL CREATININE-BSD FRML MDRD: 30 ML/MIN/1.73SQ M
GFR SERPL CREATININE-BSD FRML MDRD: 32 ML/MIN/1.73SQ M
GFR SERPL CREATININE-BSD FRML MDRD: 33 ML/MIN/1.73SQ M
GFR SERPL CREATININE-BSD FRML MDRD: 35 ML/MIN/1.73SQ M
GFR SERPL CREATININE-BSD FRML MDRD: 36 ML/MIN/1.73SQ M
GFR SERPL CREATININE-BSD FRML MDRD: 37 ML/MIN/1.73SQ M
GFR SERPL CREATININE-BSD FRML MDRD: 39 ML/MIN/1.73SQ M
GFR SERPL CREATININE-BSD FRML MDRD: 43 ML/MIN/1.73SQ M
GFR SERPL CREATININE-BSD FRML MDRD: 43 ML/MIN/1.73SQ M
GFR SERPL CREATININE-BSD FRML MDRD: 44 ML/MIN/1.73SQ M
GFR SERPL CREATININE-BSD FRML MDRD: 45 ML/MIN/1.73SQ M
GFR SERPL CREATININE-BSD FRML MDRD: 45 ML/MIN/1.73SQ M
GLUCOSE SERPL-MCNC: 101 MG/DL (ref 65–140)
GLUCOSE SERPL-MCNC: 103 MG/DL (ref 65–140)
GLUCOSE SERPL-MCNC: 103 MG/DL (ref 65–140)
GLUCOSE SERPL-MCNC: 108 MG/DL (ref 65–140)
GLUCOSE SERPL-MCNC: 110 MG/DL (ref 65–140)
GLUCOSE SERPL-MCNC: 111 MG/DL (ref 65–140)
GLUCOSE SERPL-MCNC: 114 MG/DL (ref 65–140)
GLUCOSE SERPL-MCNC: 115 MG/DL (ref 65–140)
GLUCOSE SERPL-MCNC: 115 MG/DL (ref 65–140)
GLUCOSE SERPL-MCNC: 116 MG/DL (ref 65–140)
GLUCOSE SERPL-MCNC: 118 MG/DL (ref 65–140)
GLUCOSE SERPL-MCNC: 119 MG/DL (ref 65–140)
GLUCOSE SERPL-MCNC: 119 MG/DL (ref 65–140)
GLUCOSE SERPL-MCNC: 121 MG/DL (ref 65–140)
GLUCOSE SERPL-MCNC: 121 MG/DL (ref 65–140)
GLUCOSE SERPL-MCNC: 123 MG/DL (ref 65–140)
GLUCOSE SERPL-MCNC: 124 MG/DL (ref 65–140)
GLUCOSE SERPL-MCNC: 124 MG/DL (ref 65–140)
GLUCOSE SERPL-MCNC: 127 MG/DL (ref 65–140)
GLUCOSE SERPL-MCNC: 128 MG/DL (ref 65–140)
GLUCOSE SERPL-MCNC: 128 MG/DL (ref 65–140)
GLUCOSE SERPL-MCNC: 129 MG/DL (ref 65–140)
GLUCOSE SERPL-MCNC: 132 MG/DL (ref 65–140)
GLUCOSE SERPL-MCNC: 132 MG/DL (ref 65–140)
GLUCOSE SERPL-MCNC: 133 MG/DL (ref 65–140)
GLUCOSE SERPL-MCNC: 134 MG/DL (ref 65–140)
GLUCOSE SERPL-MCNC: 134 MG/DL (ref 65–140)
GLUCOSE SERPL-MCNC: 136 MG/DL (ref 65–140)
GLUCOSE SERPL-MCNC: 136 MG/DL (ref 65–140)
GLUCOSE SERPL-MCNC: 137 MG/DL (ref 65–140)
GLUCOSE SERPL-MCNC: 138 MG/DL (ref 65–140)
GLUCOSE SERPL-MCNC: 139 MG/DL (ref 65–140)
GLUCOSE SERPL-MCNC: 140 MG/DL (ref 65–140)
GLUCOSE SERPL-MCNC: 143 MG/DL (ref 65–140)
GLUCOSE SERPL-MCNC: 144 MG/DL (ref 65–140)
GLUCOSE SERPL-MCNC: 145 MG/DL (ref 65–140)
GLUCOSE SERPL-MCNC: 146 MG/DL (ref 65–140)
GLUCOSE SERPL-MCNC: 147 MG/DL (ref 65–140)
GLUCOSE SERPL-MCNC: 147 MG/DL (ref 65–140)
GLUCOSE SERPL-MCNC: 150 MG/DL (ref 65–140)
GLUCOSE SERPL-MCNC: 151 MG/DL (ref 65–140)
GLUCOSE SERPL-MCNC: 153 MG/DL (ref 65–140)
GLUCOSE SERPL-MCNC: 159 MG/DL (ref 65–140)
GLUCOSE SERPL-MCNC: 162 MG/DL (ref 65–140)
GLUCOSE SERPL-MCNC: 163 MG/DL (ref 65–140)
GLUCOSE SERPL-MCNC: 167 MG/DL (ref 65–140)
GLUCOSE SERPL-MCNC: 167 MG/DL (ref 65–140)
GLUCOSE SERPL-MCNC: 172 MG/DL (ref 65–140)
GLUCOSE SERPL-MCNC: 175 MG/DL (ref 65–140)
GLUCOSE SERPL-MCNC: 181 MG/DL (ref 65–140)
GLUCOSE SERPL-MCNC: 182 MG/DL (ref 65–140)
GLUCOSE SERPL-MCNC: 183 MG/DL (ref 65–140)
GLUCOSE SERPL-MCNC: 184 MG/DL (ref 65–140)
GLUCOSE SERPL-MCNC: 187 MG/DL (ref 65–140)
GLUCOSE SERPL-MCNC: 191 MG/DL (ref 65–140)
GLUCOSE SERPL-MCNC: 192 MG/DL (ref 65–140)
GLUCOSE SERPL-MCNC: 196 MG/DL (ref 65–140)
GLUCOSE SERPL-MCNC: 197 MG/DL (ref 65–140)
GLUCOSE SERPL-MCNC: 214 MG/DL (ref 65–140)
GLUCOSE SERPL-MCNC: 214 MG/DL (ref 65–140)
GLUCOSE SERPL-MCNC: 215 MG/DL (ref 65–140)
GLUCOSE SERPL-MCNC: 219 MG/DL (ref 65–140)
GLUCOSE SERPL-MCNC: 221 MG/DL (ref 65–140)
GLUCOSE SERPL-MCNC: 241 MG/DL (ref 65–140)
GLUCOSE SERPL-MCNC: 250 MG/DL (ref 65–140)
GLUCOSE SERPL-MCNC: 262 MG/DL (ref 65–140)
GLUCOSE SERPL-MCNC: 265 MG/DL (ref 65–140)
GLUCOSE SERPL-MCNC: 273 MG/DL (ref 65–140)
GLUCOSE SERPL-MCNC: 276 MG/DL (ref 65–140)
GLUCOSE SERPL-MCNC: 280 MG/DL (ref 65–140)
GLUCOSE SERPL-MCNC: 288 MG/DL (ref 65–140)
GLUCOSE SERPL-MCNC: 288 MG/DL (ref 65–140)
GLUCOSE SERPL-MCNC: 294 MG/DL (ref 65–140)
GLUCOSE SERPL-MCNC: 92 MG/DL (ref 65–140)
GLUCOSE SERPL-MCNC: 98 MG/DL (ref 65–140)
GLUCOSE UR STRIP-MCNC: ABNORMAL MG/DL
HBA1C MFR BLD: 11.1 % (ref 4.2–6.3)
HCO3 BLDA-SCNC: 23.9 MMOL/L (ref 22–28)
HCO3 BLDA-SCNC: 36.4 MMOL/L (ref 22–28)
HCT VFR BLD AUTO: 21.6 % (ref 36.5–49.3)
HCT VFR BLD AUTO: 22.1 % (ref 36.5–49.3)
HCT VFR BLD AUTO: 22.9 % (ref 36.5–49.3)
HCT VFR BLD AUTO: 24.1 % (ref 36.5–49.3)
HCT VFR BLD AUTO: 24.4 % (ref 36.5–49.3)
HCT VFR BLD AUTO: 24.5 % (ref 36.5–49.3)
HCT VFR BLD AUTO: 24.6 % (ref 36.5–49.3)
HCT VFR BLD AUTO: 34.8 % (ref 36.5–49.3)
HCT VFR BLD AUTO: 41.7 % (ref 36.5–49.3)
HCT VFR BLD AUTO: 48.6 % (ref 36.5–49.3)
HCT VFR BLD CALC: 47 % (ref 36.5–49.3)
HCT VFR BLD CALC: 52 % (ref 36.5–49.3)
HGB BLD-MCNC: 10 G/DL (ref 12–17)
HGB BLD-MCNC: 11.3 G/DL (ref 12–17)
HGB BLD-MCNC: 13.8 G/DL (ref 12–17)
HGB BLD-MCNC: 16.4 G/DL (ref 12–17)
HGB BLD-MCNC: 7 G/DL (ref 12–17)
HGB BLD-MCNC: 7 G/DL (ref 12–17)
HGB BLD-MCNC: 7.2 G/DL (ref 12–17)
HGB BLD-MCNC: 7.4 G/DL (ref 12–17)
HGB BLD-MCNC: 7.8 G/DL (ref 12–17)
HGB BLD-MCNC: 8 G/DL (ref 12–17)
HGB BLD-MCNC: 8 G/DL (ref 12–17)
HGB BLDA-MCNC: 16 G/DL (ref 12–17)
HGB BLDA-MCNC: 17.7 G/DL (ref 12–17)
HGB UR QL STRIP.AUTO: ABNORMAL
IMM GRANULOCYTES # BLD AUTO: 0.05 THOUSAND/UL (ref 0–0.2)
IMM GRANULOCYTES # BLD AUTO: 0.07 THOUSAND/UL (ref 0–0.2)
IMM GRANULOCYTES # BLD AUTO: 0.1 THOUSAND/UL (ref 0–0.2)
IMM GRANULOCYTES # BLD AUTO: 0.11 THOUSAND/UL (ref 0–0.2)
IMM GRANULOCYTES # BLD AUTO: 0.2 THOUSAND/UL (ref 0–0.2)
IMM GRANULOCYTES NFR BLD AUTO: 0 % (ref 0–2)
IMM GRANULOCYTES NFR BLD AUTO: 0 % (ref 0–2)
IMM GRANULOCYTES NFR BLD AUTO: 1 % (ref 0–2)
INR PPP: 1.08 (ref 0.86–1.17)
INR PPP: 1.19 (ref 0.86–1.17)
KCT BLD-ACNC: 212 SEC (ref 89–137)
KCT BLD-ACNC: 254 SEC (ref 89–137)
KCT BLD-ACNC: 285 SEC (ref 89–137)
KETONES UR STRIP-MCNC: NEGATIVE MG/DL
LACTATE SERPL-SCNC: 2.7 MMOL/L (ref 0.5–2)
LACTATE SERPL-SCNC: 3.2 MMOL/L (ref 0.5–2)
LEUKOCYTE ESTERASE UR QL STRIP: ABNORMAL
LYMPHOCYTES # BLD AUTO: 1.35 THOUSAND/UL (ref 0.6–4.47)
LYMPHOCYTES # BLD AUTO: 1.43 THOUSANDS/ΜL (ref 0.6–4.47)
LYMPHOCYTES # BLD AUTO: 1.44 THOUSANDS/ΜL (ref 0.6–4.47)
LYMPHOCYTES # BLD AUTO: 1.5 THOUSANDS/ΜL (ref 0.6–4.47)
LYMPHOCYTES # BLD AUTO: 1.65 THOUSANDS/ΜL (ref 0.6–4.47)
LYMPHOCYTES # BLD AUTO: 2.2 THOUSANDS/ΜL (ref 0.6–4.47)
LYMPHOCYTES # BLD AUTO: 5 % (ref 14–44)
LYMPHOCYTES NFR BLD AUTO: 10 % (ref 14–44)
LYMPHOCYTES NFR BLD AUTO: 17 % (ref 14–44)
LYMPHOCYTES NFR BLD AUTO: 7 % (ref 14–44)
LYMPHOCYTES NFR BLD AUTO: 7 % (ref 14–44)
LYMPHOCYTES NFR BLD AUTO: 9 % (ref 14–44)
MAGNESIUM SERPL-MCNC: 2.2 MG/DL (ref 1.6–2.6)
MAGNESIUM SERPL-MCNC: 2.3 MG/DL (ref 1.6–2.6)
MAGNESIUM SERPL-MCNC: 2.4 MG/DL (ref 1.6–2.6)
MAGNESIUM SERPL-MCNC: 2.5 MG/DL (ref 1.6–2.6)
MAGNESIUM SERPL-MCNC: 2.7 MG/DL (ref 1.6–2.6)
MAGNESIUM SERPL-MCNC: 2.8 MG/DL (ref 1.6–2.6)
MAGNESIUM SERPL-MCNC: 2.8 MG/DL (ref 1.6–2.6)
MCH RBC QN AUTO: 31.1 PG (ref 26.8–34.3)
MCH RBC QN AUTO: 31.4 PG (ref 26.8–34.3)
MCH RBC QN AUTO: 31.5 PG (ref 26.8–34.3)
MCH RBC QN AUTO: 31.7 PG (ref 26.8–34.3)
MCH RBC QN AUTO: 31.7 PG (ref 26.8–34.3)
MCH RBC QN AUTO: 31.8 PG (ref 26.8–34.3)
MCH RBC QN AUTO: 31.9 PG (ref 26.8–34.3)
MCH RBC QN AUTO: 32 PG (ref 26.8–34.3)
MCH RBC QN AUTO: 32.4 PG (ref 26.8–34.3)
MCHC RBC AUTO-ENTMCNC: 30.2 G/DL (ref 31.4–37.4)
MCHC RBC AUTO-ENTMCNC: 31.4 G/DL (ref 31.4–37.4)
MCHC RBC AUTO-ENTMCNC: 31.7 G/DL (ref 31.4–37.4)
MCHC RBC AUTO-ENTMCNC: 32.4 G/DL (ref 31.4–37.4)
MCHC RBC AUTO-ENTMCNC: 32.4 G/DL (ref 31.4–37.4)
MCHC RBC AUTO-ENTMCNC: 32.5 G/DL (ref 31.4–37.4)
MCHC RBC AUTO-ENTMCNC: 32.5 G/DL (ref 31.4–37.4)
MCHC RBC AUTO-ENTMCNC: 33.1 G/DL (ref 31.4–37.4)
MCHC RBC AUTO-ENTMCNC: 33.7 G/DL (ref 31.4–37.4)
MCV RBC AUTO: 100 FL (ref 82–98)
MCV RBC AUTO: 103 FL (ref 82–98)
MCV RBC AUTO: 95 FL (ref 82–98)
MCV RBC AUTO: 96 FL (ref 82–98)
MCV RBC AUTO: 98 FL (ref 82–98)
MONOCYTES # BLD AUTO: 1.11 THOUSAND/ΜL (ref 0.17–1.22)
MONOCYTES # BLD AUTO: 1.15 THOUSAND/ΜL (ref 0.17–1.22)
MONOCYTES # BLD AUTO: 1.4 THOUSAND/ΜL (ref 0.17–1.22)
MONOCYTES # BLD AUTO: 1.77 THOUSAND/ΜL (ref 0.17–1.22)
MONOCYTES # BLD AUTO: 1.86 THOUSAND/ΜL (ref 0.17–1.22)
MONOCYTES # BLD AUTO: 2.7 THOUSAND/UL (ref 0–1.22)
MONOCYTES NFR BLD AUTO: 7 % (ref 4–12)
MONOCYTES NFR BLD AUTO: 8 % (ref 4–12)
MONOCYTES NFR BLD AUTO: 8 % (ref 4–12)
MONOCYTES NFR BLD AUTO: 9 % (ref 4–12)
MONOCYTES NFR BLD AUTO: 9 % (ref 4–12)
MONOCYTES NFR BLD: 10 % (ref 4–12)
NASAL CANNULA: 2
NEUTROPHILS # BLD AUTO: 13.34 THOUSANDS/ΜL (ref 1.85–7.62)
NEUTROPHILS # BLD AUTO: 13.82 THOUSANDS/ΜL (ref 1.85–7.62)
NEUTROPHILS # BLD AUTO: 16.46 THOUSANDS/ΜL (ref 1.85–7.62)
NEUTROPHILS # BLD AUTO: 18.71 THOUSANDS/ΜL (ref 1.85–7.62)
NEUTROPHILS # BLD AUTO: 9.03 THOUSANDS/ΜL (ref 1.85–7.62)
NEUTROPHILS # BLD MANUAL: 22.92 THOUSAND/UL (ref 1.85–7.62)
NEUTS SEG NFR BLD AUTO: 71 % (ref 43–75)
NEUTS SEG NFR BLD AUTO: 81 % (ref 43–75)
NEUTS SEG NFR BLD AUTO: 83 % (ref 43–75)
NEUTS SEG NFR BLD AUTO: 84 % (ref 43–75)
NEUTS SEG NFR BLD AUTO: 84 % (ref 43–75)
NEUTS SEG NFR BLD AUTO: 85 % (ref 43–75)
NITRITE UR QL STRIP: NEGATIVE
NON-SQ EPI CELLS URNS QL MICRO: ABNORMAL /HPF
NRBC BLD AUTO-RTO: 0 /100 WBCS
NRBC BLD AUTO-RTO: 1 /100 WBCS
O2 CT BLDA-SCNC: 18.4 ML/DL (ref 16–23)
OXYHGB MFR BLDA: 93.8 % (ref 94–97)
P AXIS: 59 DEGREES
P AXIS: 70 DEGREES
PCO2 BLD: 32 MMOL/L (ref 21–32)
PCO2 BLD: 38 MMOL/L (ref 21–32)
PCO2 BLD: 52.5 MM HG (ref 36–44)
PCO2 BLDA: 37.4 MM HG (ref 36–44)
PH BLD: 7.45 [PH] (ref 7.35–7.45)
PH BLDA: 7.42 [PH] (ref 7.35–7.45)
PH UR STRIP.AUTO: 5 [PH] (ref 4.5–8)
PHOSPHATE SERPL-MCNC: 3.2 MG/DL (ref 2.3–4.1)
PHOSPHATE SERPL-MCNC: 3.5 MG/DL (ref 2.3–4.1)
PHOSPHATE SERPL-MCNC: 3.9 MG/DL (ref 2.3–4.1)
PLATELET # BLD AUTO: 120 THOUSANDS/UL (ref 149–390)
PLATELET # BLD AUTO: 131 THOUSANDS/UL (ref 149–390)
PLATELET # BLD AUTO: 136 THOUSANDS/UL (ref 149–390)
PLATELET # BLD AUTO: 154 THOUSANDS/UL (ref 149–390)
PLATELET # BLD AUTO: 167 THOUSANDS/UL (ref 149–390)
PLATELET # BLD AUTO: 177 THOUSANDS/UL (ref 149–390)
PLATELET # BLD AUTO: 177 THOUSANDS/UL (ref 149–390)
PLATELET # BLD AUTO: 228 THOUSANDS/UL (ref 149–390)
PLATELET # BLD AUTO: 299 THOUSANDS/UL (ref 149–390)
PLATELET BLD QL SMEAR: ADEQUATE
PMV BLD AUTO: 10.9 FL (ref 8.9–12.7)
PMV BLD AUTO: 10.9 FL (ref 8.9–12.7)
PMV BLD AUTO: 11.1 FL (ref 8.9–12.7)
PMV BLD AUTO: 11.3 FL (ref 8.9–12.7)
PMV BLD AUTO: 11.6 FL (ref 8.9–12.7)
PMV BLD AUTO: 11.6 FL (ref 8.9–12.7)
PMV BLD AUTO: 11.8 FL (ref 8.9–12.7)
PMV BLD AUTO: 11.8 FL (ref 8.9–12.7)
PMV BLD AUTO: 12.1 FL (ref 8.9–12.7)
PO2 BLD: >400 MM HG (ref 75–129)
PO2 BLDA: 82.4 MM HG (ref 75–129)
POLYCHROMASIA BLD QL SMEAR: PRESENT
POTASSIUM BLD-SCNC: 3.2 MMOL/L (ref 3.5–5.3)
POTASSIUM BLD-SCNC: 3.2 MMOL/L (ref 3.5–5.3)
POTASSIUM SERPL-SCNC: 3.1 MMOL/L (ref 3.5–5.3)
POTASSIUM SERPL-SCNC: 3.1 MMOL/L (ref 3.5–5.3)
POTASSIUM SERPL-SCNC: 3.2 MMOL/L (ref 3.5–5.3)
POTASSIUM SERPL-SCNC: 3.4 MMOL/L (ref 3.5–5.3)
POTASSIUM SERPL-SCNC: 3.6 MMOL/L (ref 3.5–5.3)
POTASSIUM SERPL-SCNC: 3.7 MMOL/L (ref 3.5–5.3)
POTASSIUM SERPL-SCNC: 3.8 MMOL/L (ref 3.5–5.3)
POTASSIUM SERPL-SCNC: 3.8 MMOL/L (ref 3.5–5.3)
POTASSIUM SERPL-SCNC: 3.9 MMOL/L (ref 3.5–5.3)
POTASSIUM SERPL-SCNC: 4.2 MMOL/L (ref 3.5–5.3)
PR INTERVAL: 188 MS
PR INTERVAL: 200 MS
PROT SERPL-MCNC: 5.7 G/DL (ref 6.4–8.2)
PROT UR STRIP-MCNC: ABNORMAL MG/DL
PROTHROMBIN TIME: 13.4 SECONDS (ref 11.8–14.2)
PROTHROMBIN TIME: 15.2 SECONDS (ref 11.8–14.2)
QRS AXIS: 64 DEGREES
QRS AXIS: 69 DEGREES
QRS AXIS: 73 DEGREES
QRS AXIS: 83 DEGREES
QRSD INTERVAL: 142 MS
QRSD INTERVAL: 150 MS
QRSD INTERVAL: 158 MS
QRSD INTERVAL: 163 MS
QT INTERVAL: 342 MS
QT INTERVAL: 375 MS
QT INTERVAL: 433 MS
QT INTERVAL: 471 MS
QTC INTERVAL: 491 MS
QTC INTERVAL: 511 MS
QTC INTERVAL: 512 MS
QTC INTERVAL: 527 MS
RBC # BLD AUTO: 2.16 MILLION/UL (ref 3.88–5.62)
RBC # BLD AUTO: 2.22 MILLION/UL (ref 3.88–5.62)
RBC # BLD AUTO: 2.29 MILLION/UL (ref 3.88–5.62)
RBC # BLD AUTO: 2.38 MILLION/UL (ref 3.88–5.62)
RBC # BLD AUTO: 2.46 MILLION/UL (ref 3.88–5.62)
RBC # BLD AUTO: 2.51 MILLION/UL (ref 3.88–5.62)
RBC # BLD AUTO: 3.55 MILLION/UL (ref 3.88–5.62)
RBC # BLD AUTO: 4.36 MILLION/UL (ref 3.88–5.62)
RBC # BLD AUTO: 5.13 MILLION/UL (ref 3.88–5.62)
RBC #/AREA URNS AUTO: ABNORMAL /HPF
RBC MORPH BLD: PRESENT
RH BLD: POSITIVE
RH BLD: POSITIVE
SODIUM BLD-SCNC: 134 MMOL/L (ref 136–145)
SODIUM BLD-SCNC: 135 MMOL/L (ref 136–145)
SODIUM SERPL-SCNC: 133 MMOL/L (ref 136–145)
SODIUM SERPL-SCNC: 134 MMOL/L (ref 136–145)
SODIUM SERPL-SCNC: 135 MMOL/L (ref 136–145)
SODIUM SERPL-SCNC: 136 MMOL/L (ref 136–145)
SODIUM SERPL-SCNC: 137 MMOL/L (ref 136–145)
SODIUM SERPL-SCNC: 137 MMOL/L (ref 136–145)
SODIUM SERPL-SCNC: 138 MMOL/L (ref 136–145)
SODIUM SERPL-SCNC: 140 MMOL/L (ref 136–145)
SODIUM SERPL-SCNC: 143 MMOL/L (ref 136–145)
SP GR UR STRIP.AUTO: 1.03 (ref 1–1.03)
SPECIMEN EXPIRATION DATE: NORMAL
SPECIMEN EXPIRATION DATE: NORMAL
SPECIMEN SOURCE: ABNORMAL
T WAVE AXIS: -14 DEGREES
T WAVE AXIS: -39 DEGREES
T WAVE AXIS: -60 DEGREES
T WAVE AXIS: -68 DEGREES
UNIT DISPENSE STATUS: NORMAL
UNIT PRODUCT CODE: NORMAL
UNIT RH: NORMAL
UROBILINOGEN UR QL STRIP.AUTO: 1 E.U./DL
VENTRICULAR RATE: 112 BPM
VENTRICULAR RATE: 134 BPM
VENTRICULAR RATE: 75 BPM
VENTRICULAR RATE: 77 BPM
WBC # BLD AUTO: 12.82 THOUSAND/UL (ref 4.31–10.16)
WBC # BLD AUTO: 13.52 THOUSAND/UL (ref 4.31–10.16)
WBC # BLD AUTO: 16.51 THOUSAND/UL (ref 4.31–10.16)
WBC # BLD AUTO: 16.58 THOUSAND/UL (ref 4.31–10.16)
WBC # BLD AUTO: 19.87 THOUSAND/UL (ref 4.31–10.16)
WBC # BLD AUTO: 22.26 THOUSAND/UL (ref 4.31–10.16)
WBC # BLD AUTO: 25.13 THOUSAND/UL (ref 4.31–10.16)
WBC # BLD AUTO: 25.8 THOUSAND/UL (ref 4.31–10.16)
WBC # BLD AUTO: 26.96 THOUSAND/UL (ref 4.31–10.16)
WBC #/AREA URNS AUTO: ABNORMAL /HPF

## 2019-01-01 PROCEDURE — 99232 SBSQ HOSP IP/OBS MODERATE 35: CPT | Performed by: INTERNAL MEDICINE

## 2019-01-01 PROCEDURE — 86850 RBC ANTIBODY SCREEN: CPT | Performed by: SURGERY

## 2019-01-01 PROCEDURE — 99223 1ST HOSP IP/OBS HIGH 75: CPT | Performed by: INTERNAL MEDICINE

## 2019-01-01 PROCEDURE — 80047 BASIC METABLC PNL IONIZED CA: CPT

## 2019-01-01 PROCEDURE — 85018 HEMOGLOBIN: CPT | Performed by: SURGERY

## 2019-01-01 PROCEDURE — 82553 CREATINE MB FRACTION: CPT | Performed by: EMERGENCY MEDICINE

## 2019-01-01 PROCEDURE — 85025 COMPLETE CBC W/AUTO DIFF WBC: CPT | Performed by: SURGERY

## 2019-01-01 PROCEDURE — 96365 THER/PROPH/DIAG IV INF INIT: CPT

## 2019-01-01 PROCEDURE — 85730 THROMBOPLASTIN TIME PARTIAL: CPT | Performed by: SURGERY

## 2019-01-01 PROCEDURE — 99024 POSTOP FOLLOW-UP VISIT: CPT | Performed by: SURGERY

## 2019-01-01 PROCEDURE — 99233 SBSQ HOSP IP/OBS HIGH 50: CPT | Performed by: INTERNAL MEDICINE

## 2019-01-01 PROCEDURE — 81001 URINALYSIS AUTO W/SCOPE: CPT | Performed by: EMERGENCY MEDICINE

## 2019-01-01 PROCEDURE — 03HY32Z INSERTION OF MONITORING DEVICE INTO UPPER ARTERY, PERCUTANEOUS APPROACH: ICD-10-PCS | Performed by: SURGERY

## 2019-01-01 PROCEDURE — 83735 ASSAY OF MAGNESIUM: CPT | Performed by: SURGERY

## 2019-01-01 PROCEDURE — 4A133J1 MONITORING OF ARTERIAL PULSE, PERIPHERAL, PERCUTANEOUS APPROACH: ICD-10-PCS | Performed by: SURGERY

## 2019-01-01 PROCEDURE — 86901 BLOOD TYPING SEROLOGIC RH(D): CPT | Performed by: PHYSICIAN ASSISTANT

## 2019-01-01 PROCEDURE — 85014 HEMATOCRIT: CPT

## 2019-01-01 PROCEDURE — 82805 BLOOD GASES W/O2 SATURATION: CPT | Performed by: EMERGENCY MEDICINE

## 2019-01-01 PROCEDURE — 86900 BLOOD TYPING SEROLOGIC ABO: CPT | Performed by: PHYSICIAN ASSISTANT

## 2019-01-01 PROCEDURE — 85014 HEMATOCRIT: CPT | Performed by: SURGERY

## 2019-01-01 PROCEDURE — 82948 REAGENT STRIP/BLOOD GLUCOSE: CPT

## 2019-01-01 PROCEDURE — 82550 ASSAY OF CK (CPK): CPT | Performed by: EMERGENCY MEDICINE

## 2019-01-01 PROCEDURE — 83735 ASSAY OF MAGNESIUM: CPT | Performed by: PHYSICIAN ASSISTANT

## 2019-01-01 PROCEDURE — 94760 N-INVAS EAR/PLS OXIMETRY 1: CPT

## 2019-01-01 PROCEDURE — 83735 ASSAY OF MAGNESIUM: CPT | Performed by: STUDENT IN AN ORGANIZED HEALTH CARE EDUCATION/TRAINING PROGRAM

## 2019-01-01 PROCEDURE — 85730 THROMBOPLASTIN TIME PARTIAL: CPT | Performed by: EMERGENCY MEDICINE

## 2019-01-01 PROCEDURE — 93005 ELECTROCARDIOGRAM TRACING: CPT

## 2019-01-01 PROCEDURE — C8929 TTE W OR WO FOL WCON,DOPPLER: HCPCS

## 2019-01-01 PROCEDURE — 82553 CREATINE MB FRACTION: CPT | Performed by: PHYSICIAN ASSISTANT

## 2019-01-01 PROCEDURE — 85027 COMPLETE CBC AUTOMATED: CPT | Performed by: PHYSICIAN ASSISTANT

## 2019-01-01 PROCEDURE — 04CK0ZZ EXTIRPATION OF MATTER FROM RIGHT FEMORAL ARTERY, OPEN APPROACH: ICD-10-PCS | Performed by: SURGERY

## 2019-01-01 PROCEDURE — 4A133B1 MONITORING OF ARTERIAL PRESSURE, PERIPHERAL, PERCUTANEOUS APPROACH: ICD-10-PCS | Performed by: SURGERY

## 2019-01-01 PROCEDURE — 83735 ASSAY OF MAGNESIUM: CPT | Performed by: EMERGENCY MEDICINE

## 2019-01-01 PROCEDURE — 80048 BASIC METABOLIC PNL TOTAL CA: CPT | Performed by: PHYSICIAN ASSISTANT

## 2019-01-01 PROCEDURE — 85027 COMPLETE CBC AUTOMATED: CPT | Performed by: SURGERY

## 2019-01-01 PROCEDURE — 0KNS0ZZ RELEASE RIGHT LOWER LEG MUSCLE, OPEN APPROACH: ICD-10-PCS | Performed by: SURGERY

## 2019-01-01 PROCEDURE — 85610 PROTHROMBIN TIME: CPT | Performed by: SURGERY

## 2019-01-01 PROCEDURE — 83036 HEMOGLOBIN GLYCOSYLATED A1C: CPT | Performed by: STUDENT IN AN ORGANIZED HEALTH CARE EDUCATION/TRAINING PROGRAM

## 2019-01-01 PROCEDURE — 82330 ASSAY OF CALCIUM: CPT | Performed by: PHYSICIAN ASSISTANT

## 2019-01-01 PROCEDURE — 86920 COMPATIBILITY TEST SPIN: CPT

## 2019-01-01 PROCEDURE — 80048 BASIC METABOLIC PNL TOTAL CA: CPT | Performed by: EMERGENCY MEDICINE

## 2019-01-01 PROCEDURE — P9021 RED BLOOD CELLS UNIT: HCPCS

## 2019-01-01 PROCEDURE — 85025 COMPLETE CBC W/AUTO DIFF WBC: CPT | Performed by: STUDENT IN AN ORGANIZED HEALTH CARE EDUCATION/TRAINING PROGRAM

## 2019-01-01 PROCEDURE — 85025 COMPLETE CBC W/AUTO DIFF WBC: CPT | Performed by: PHYSICIAN ASSISTANT

## 2019-01-01 PROCEDURE — 93306 TTE W/DOPPLER COMPLETE: CPT | Performed by: INTERNAL MEDICINE

## 2019-01-01 PROCEDURE — 99233 SBSQ HOSP IP/OBS HIGH 50: CPT | Performed by: EMERGENCY MEDICINE

## 2019-01-01 PROCEDURE — 99024 POSTOP FOLLOW-UP VISIT: CPT | Performed by: PHYSICIAN ASSISTANT

## 2019-01-01 PROCEDURE — 93010 ELECTROCARDIOGRAM REPORT: CPT | Performed by: INTERNAL MEDICINE

## 2019-01-01 PROCEDURE — 85007 BL SMEAR W/DIFF WBC COUNT: CPT | Performed by: PHYSICIAN ASSISTANT

## 2019-01-01 PROCEDURE — 04CL0ZZ EXTIRPATION OF MATTER FROM LEFT FEMORAL ARTERY, OPEN APPROACH: ICD-10-PCS | Performed by: SURGERY

## 2019-01-01 PROCEDURE — 82553 CREATINE MB FRACTION: CPT | Performed by: SURGERY

## 2019-01-01 PROCEDURE — 83605 ASSAY OF LACTIC ACID: CPT | Performed by: EMERGENCY MEDICINE

## 2019-01-01 PROCEDURE — 84132 ASSAY OF SERUM POTASSIUM: CPT

## 2019-01-01 PROCEDURE — 2W1QX6Z COMPRESSION OF RIGHT LOWER LEG USING PRESSURE DRESSING: ICD-10-PCS | Performed by: SURGERY

## 2019-01-01 PROCEDURE — 84100 ASSAY OF PHOSPHORUS: CPT | Performed by: PHYSICIAN ASSISTANT

## 2019-01-01 PROCEDURE — 80048 BASIC METABOLIC PNL TOTAL CA: CPT | Performed by: SURGERY

## 2019-01-01 PROCEDURE — 27602 DECOMPRESSION OF LOWER LEG: CPT | Performed by: SURGERY

## 2019-01-01 PROCEDURE — 82550 ASSAY OF CK (CPK): CPT | Performed by: PHYSICIAN ASSISTANT

## 2019-01-01 PROCEDURE — 99232 SBSQ HOSP IP/OBS MODERATE 35: CPT | Performed by: EMERGENCY MEDICINE

## 2019-01-01 PROCEDURE — 85347 COAGULATION TIME ACTIVATED: CPT

## 2019-01-01 PROCEDURE — 82803 BLOOD GASES ANY COMBINATION: CPT

## 2019-01-01 PROCEDURE — 99285 EMERGENCY DEPT VISIT HI MDM: CPT

## 2019-01-01 PROCEDURE — 86900 BLOOD TYPING SEROLOGIC ABO: CPT | Performed by: SURGERY

## 2019-01-01 PROCEDURE — 96375 TX/PRO/DX INJ NEW DRUG ADDON: CPT

## 2019-01-01 PROCEDURE — 86901 BLOOD TYPING SEROLOGIC RH(D): CPT | Performed by: SURGERY

## 2019-01-01 PROCEDURE — 80053 COMPREHEN METABOLIC PANEL: CPT | Performed by: EMERGENCY MEDICINE

## 2019-01-01 PROCEDURE — 99222 1ST HOSP IP/OBS MODERATE 55: CPT | Performed by: INTERNAL MEDICINE

## 2019-01-01 PROCEDURE — 84100 ASSAY OF PHOSPHORUS: CPT | Performed by: STUDENT IN AN ORGANIZED HEALTH CARE EDUCATION/TRAINING PROGRAM

## 2019-01-01 PROCEDURE — 84100 ASSAY OF PHOSPHORUS: CPT | Performed by: EMERGENCY MEDICINE

## 2019-01-01 PROCEDURE — 84295 ASSAY OF SERUM SODIUM: CPT

## 2019-01-01 PROCEDURE — 87040 BLOOD CULTURE FOR BACTERIA: CPT | Performed by: EMERGENCY MEDICINE

## 2019-01-01 PROCEDURE — 86923 COMPATIBILITY TEST ELECTRIC: CPT

## 2019-01-01 PROCEDURE — 36415 COLL VENOUS BLD VENIPUNCTURE: CPT | Performed by: EMERGENCY MEDICINE

## 2019-01-01 PROCEDURE — 99223 1ST HOSP IP/OBS HIGH 75: CPT | Performed by: SURGERY

## 2019-01-01 PROCEDURE — 0KNT0ZZ RELEASE LEFT LOWER LEG MUSCLE, OPEN APPROACH: ICD-10-PCS | Performed by: SURGERY

## 2019-01-01 PROCEDURE — 88304 TISSUE EXAM BY PATHOLOGIST: CPT | Performed by: PATHOLOGY

## 2019-01-01 PROCEDURE — 85610 PROTHROMBIN TIME: CPT | Performed by: EMERGENCY MEDICINE

## 2019-01-01 PROCEDURE — 82330 ASSAY OF CALCIUM: CPT

## 2019-01-01 PROCEDURE — 86850 RBC ANTIBODY SCREEN: CPT | Performed by: PHYSICIAN ASSISTANT

## 2019-01-01 PROCEDURE — 75635 CT ANGIO ABDOMINAL ARTERIES: CPT

## 2019-01-01 PROCEDURE — 82947 ASSAY GLUCOSE BLOOD QUANT: CPT

## 2019-01-01 PROCEDURE — 82550 ASSAY OF CK (CPK): CPT | Performed by: SURGERY

## 2019-01-01 PROCEDURE — 85025 COMPLETE CBC W/AUTO DIFF WBC: CPT | Performed by: EMERGENCY MEDICINE

## 2019-01-01 PROCEDURE — 34201 REMOVAL OF ARTERY CLOT: CPT | Performed by: SURGERY

## 2019-01-01 RX ORDER — CHLORHEXIDINE GLUCONATE 0.12 MG/ML
15 RINSE ORAL ONCE
Status: COMPLETED | OUTPATIENT
Start: 2019-01-01 | End: 2019-01-01

## 2019-01-01 RX ORDER — SODIUM CHLORIDE, SODIUM GLUCONATE, SODIUM ACETATE, POTASSIUM CHLORIDE, MAGNESIUM CHLORIDE, SODIUM PHOSPHATE, DIBASIC, AND POTASSIUM PHOSPHATE .53; .5; .37; .037; .03; .012; .00082 G/100ML; G/100ML; G/100ML; G/100ML; G/100ML; G/100ML; G/100ML
1000 INJECTION, SOLUTION INTRAVENOUS ONCE
Status: COMPLETED | OUTPATIENT
Start: 2019-01-01 | End: 2019-01-01

## 2019-01-01 RX ORDER — CEFAZOLIN SODIUM 1 G/3ML
INJECTION, POWDER, FOR SOLUTION INTRAMUSCULAR; INTRAVENOUS AS NEEDED
Status: DISCONTINUED | OUTPATIENT
Start: 2019-01-01 | End: 2019-01-01 | Stop reason: SURG

## 2019-01-01 RX ORDER — LABETALOL HYDROCHLORIDE 5 MG/ML
INJECTION, SOLUTION INTRAVENOUS AS NEEDED
Status: DISCONTINUED | OUTPATIENT
Start: 2019-01-01 | End: 2019-01-01 | Stop reason: SURG

## 2019-01-01 RX ORDER — HEPARIN SODIUM 1000 [USP'U]/ML
9200 INJECTION, SOLUTION INTRAVENOUS; SUBCUTANEOUS ONCE
Status: COMPLETED | OUTPATIENT
Start: 2019-01-01 | End: 2019-01-01

## 2019-01-01 RX ORDER — SODIUM CHLORIDE, SODIUM GLUCONATE, SODIUM ACETATE, POTASSIUM CHLORIDE, MAGNESIUM CHLORIDE, SODIUM PHOSPHATE, DIBASIC, AND POTASSIUM PHOSPHATE .53; .5; .37; .037; .03; .012; .00082 G/100ML; G/100ML; G/100ML; G/100ML; G/100ML; G/100ML; G/100ML
200 INJECTION, SOLUTION INTRAVENOUS CONTINUOUS
Status: DISCONTINUED | OUTPATIENT
Start: 2019-01-01 | End: 2019-01-01

## 2019-01-01 RX ORDER — LIDOCAINE HYDROCHLORIDE 10 MG/ML
INJECTION, SOLUTION EPIDURAL; INFILTRATION; INTRACAUDAL; PERINEURAL
Status: DISPENSED
Start: 2019-01-01 | End: 2019-01-01

## 2019-01-01 RX ORDER — HEPARIN SODIUM 10000 [USP'U]/100ML
3-30 INJECTION, SOLUTION INTRAVENOUS
Status: DISCONTINUED | OUTPATIENT
Start: 2019-01-01 | End: 2019-01-01

## 2019-01-01 RX ORDER — ROCURONIUM BROMIDE 10 MG/ML
INJECTION, SOLUTION INTRAVENOUS AS NEEDED
Status: DISCONTINUED | OUTPATIENT
Start: 2019-01-01 | End: 2019-01-01 | Stop reason: SURG

## 2019-01-01 RX ORDER — POTASSIUM CHLORIDE 14.9 MG/ML
INJECTION INTRAVENOUS CONTINUOUS PRN
Status: DISCONTINUED | OUTPATIENT
Start: 2019-01-01 | End: 2019-01-01 | Stop reason: SURG

## 2019-01-01 RX ORDER — METOPROLOL SUCCINATE 25 MG/1
100 TABLET, EXTENDED RELEASE ORAL DAILY
Status: DISCONTINUED | OUTPATIENT
Start: 2019-01-01 | End: 2019-01-01

## 2019-01-01 RX ORDER — POTASSIUM CHLORIDE 20 MEQ/1
40 TABLET, EXTENDED RELEASE ORAL 2 TIMES DAILY
Status: DISPENSED | OUTPATIENT
Start: 2019-01-01 | End: 2019-01-01

## 2019-01-01 RX ORDER — LANOLIN ALCOHOL/MO/W.PET/CERES
6 CREAM (GRAM) TOPICAL
Status: DISCONTINUED | OUTPATIENT
Start: 2019-01-01 | End: 2019-01-01

## 2019-01-01 RX ORDER — OXYCODONE HYDROCHLORIDE 5 MG/1
2.5 TABLET ORAL 3 TIMES DAILY
Status: DISCONTINUED | OUTPATIENT
Start: 2019-01-01 | End: 2019-01-01

## 2019-01-01 RX ORDER — SODIUM CHLORIDE 9 MG/ML
50 INJECTION, SOLUTION INTRAVENOUS CONTINUOUS
Status: DISCONTINUED | OUTPATIENT
Start: 2019-01-01 | End: 2019-01-01

## 2019-01-01 RX ORDER — METOPROLOL TARTRATE 50 MG/1
100 TABLET, FILM COATED ORAL EVERY 12 HOURS SCHEDULED
Status: DISCONTINUED | OUTPATIENT
Start: 2019-01-01 | End: 2019-01-01

## 2019-01-01 RX ORDER — ATORVASTATIN CALCIUM 40 MG/1
40 TABLET, FILM COATED ORAL
Status: DISCONTINUED | OUTPATIENT
Start: 2019-01-01 | End: 2019-01-01

## 2019-01-01 RX ORDER — MORPHINE SULFATE 10 MG/ML
10 INJECTION, SOLUTION INTRAMUSCULAR; INTRAVENOUS EVERY 4 HOURS PRN
Status: DISCONTINUED | OUTPATIENT
Start: 2019-01-01 | End: 2019-01-01

## 2019-01-01 RX ORDER — QUETIAPINE FUMARATE 25 MG/1
50 TABLET, FILM COATED ORAL
Status: DISCONTINUED | OUTPATIENT
Start: 2019-01-01 | End: 2019-01-01

## 2019-01-01 RX ORDER — AMOXICILLIN 250 MG
2 CAPSULE ORAL DAILY
Status: DISCONTINUED | OUTPATIENT
Start: 2019-01-01 | End: 2019-01-01 | Stop reason: HOSPADM

## 2019-01-01 RX ORDER — POTASSIUM CHLORIDE 20 MEQ/1
40 TABLET, EXTENDED RELEASE ORAL ONCE
Status: DISCONTINUED | OUTPATIENT
Start: 2019-01-01 | End: 2019-01-01

## 2019-01-01 RX ORDER — PROPOFOL 10 MG/ML
INJECTION, EMULSION INTRAVENOUS AS NEEDED
Status: DISCONTINUED | OUTPATIENT
Start: 2019-01-01 | End: 2019-01-01 | Stop reason: SURG

## 2019-01-01 RX ORDER — MORPHINE SULFATE 4 MG/ML
4 INJECTION, SOLUTION INTRAMUSCULAR; INTRAVENOUS EVERY 4 HOURS PRN
Status: DISCONTINUED | OUTPATIENT
Start: 2019-01-01 | End: 2019-01-01

## 2019-01-01 RX ORDER — POTASSIUM CHLORIDE 20 MEQ/1
20 TABLET, EXTENDED RELEASE ORAL ONCE
Status: COMPLETED | OUTPATIENT
Start: 2019-01-01 | End: 2019-01-01

## 2019-01-01 RX ORDER — LANOLIN ALCOHOL/MO/W.PET/CERES
3 CREAM (GRAM) TOPICAL
Status: DISCONTINUED | OUTPATIENT
Start: 2019-01-01 | End: 2019-01-01

## 2019-01-01 RX ORDER — DULOXETIN HYDROCHLORIDE 30 MG/1
30 CAPSULE, DELAYED RELEASE ORAL DAILY
Status: DISCONTINUED | OUTPATIENT
Start: 2019-01-01 | End: 2019-01-01

## 2019-01-01 RX ORDER — OXYCODONE HYDROCHLORIDE 5 MG/1
5 TABLET ORAL EVERY 4 HOURS PRN
Status: DISCONTINUED | OUTPATIENT
Start: 2019-01-01 | End: 2019-01-01

## 2019-01-01 RX ORDER — ACETAMINOPHEN 160 MG/5ML
650 SUSPENSION, ORAL (FINAL DOSE FORM) ORAL EVERY 6 HOURS
Status: DISCONTINUED | OUTPATIENT
Start: 2019-01-01 | End: 2019-01-01

## 2019-01-01 RX ORDER — SODIUM CHLORIDE, SODIUM GLUCONATE, SODIUM ACETATE, POTASSIUM CHLORIDE, MAGNESIUM CHLORIDE, SODIUM PHOSPHATE, DIBASIC, AND POTASSIUM PHOSPHATE .53; .5; .37; .037; .03; .012; .00082 G/100ML; G/100ML; G/100ML; G/100ML; G/100ML; G/100ML; G/100ML
500 INJECTION, SOLUTION INTRAVENOUS ONCE
Status: COMPLETED | OUTPATIENT
Start: 2019-01-01 | End: 2019-01-01

## 2019-01-01 RX ORDER — ONDANSETRON 2 MG/ML
4 INJECTION INTRAMUSCULAR; INTRAVENOUS ONCE AS NEEDED
Status: DISCONTINUED | OUTPATIENT
Start: 2019-01-01 | End: 2019-01-01 | Stop reason: HOSPADM

## 2019-01-01 RX ORDER — POTASSIUM CHLORIDE 14.9 MG/ML
20 INJECTION INTRAVENOUS ONCE
Status: COMPLETED | OUTPATIENT
Start: 2019-01-01 | End: 2019-01-01

## 2019-01-01 RX ORDER — SUCCINYLCHOLINE CHLORIDE 20 MG/ML
INJECTION INTRAMUSCULAR; INTRAVENOUS AS NEEDED
Status: DISCONTINUED | OUTPATIENT
Start: 2019-01-01 | End: 2019-01-01 | Stop reason: SURG

## 2019-01-01 RX ORDER — ALBUMIN, HUMAN INJ 5% 5 %
SOLUTION INTRAVENOUS CONTINUOUS PRN
Status: DISCONTINUED | OUTPATIENT
Start: 2019-01-01 | End: 2019-01-01 | Stop reason: SURG

## 2019-01-01 RX ORDER — HEPARIN SODIUM 1000 [USP'U]/ML
4600 INJECTION, SOLUTION INTRAVENOUS; SUBCUTANEOUS AS NEEDED
Status: DISCONTINUED | OUTPATIENT
Start: 2019-01-01 | End: 2019-01-01

## 2019-01-01 RX ORDER — GLYCOPYRROLATE 0.2 MG/ML
INJECTION INTRAMUSCULAR; INTRAVENOUS AS NEEDED
Status: DISCONTINUED | OUTPATIENT
Start: 2019-01-01 | End: 2019-01-01 | Stop reason: SURG

## 2019-01-01 RX ORDER — OXYCODONE HYDROCHLORIDE AND ACETAMINOPHEN 5; 325 MG/1; MG/1
2 TABLET ORAL EVERY 4 HOURS PRN
Status: DISCONTINUED | OUTPATIENT
Start: 2019-01-01 | End: 2019-01-01

## 2019-01-01 RX ORDER — ONDANSETRON 2 MG/ML
4 INJECTION INTRAMUSCULAR; INTRAVENOUS EVERY 4 HOURS PRN
Status: DISCONTINUED | OUTPATIENT
Start: 2019-01-01 | End: 2019-01-01

## 2019-01-01 RX ORDER — SODIUM CHLORIDE 9 MG/ML
75 INJECTION, SOLUTION INTRAVENOUS CONTINUOUS
Status: DISCONTINUED | OUTPATIENT
Start: 2019-01-01 | End: 2019-01-01

## 2019-01-01 RX ORDER — LORAZEPAM 2 MG/ML
1 INJECTION INTRAMUSCULAR EVERY 2 HOUR PRN
Status: DISCONTINUED | OUTPATIENT
Start: 2019-01-01 | End: 2019-01-01 | Stop reason: HOSPADM

## 2019-01-01 RX ORDER — MAGNESIUM HYDROXIDE 1200 MG/15ML
LIQUID ORAL AS NEEDED
Status: DISCONTINUED | OUTPATIENT
Start: 2019-01-01 | End: 2019-01-01 | Stop reason: HOSPADM

## 2019-01-01 RX ORDER — POTASSIUM CHLORIDE 14.9 MG/ML
20 INJECTION INTRAVENOUS
Status: DISCONTINUED | OUTPATIENT
Start: 2019-01-01 | End: 2019-01-01

## 2019-01-01 RX ORDER — OXYCODONE HYDROCHLORIDE 10 MG/1
10 TABLET ORAL EVERY 4 HOURS PRN
Status: DISCONTINUED | OUTPATIENT
Start: 2019-01-01 | End: 2019-01-01

## 2019-01-01 RX ORDER — ONDANSETRON 2 MG/ML
INJECTION INTRAMUSCULAR; INTRAVENOUS AS NEEDED
Status: DISCONTINUED | OUTPATIENT
Start: 2019-01-01 | End: 2019-01-01 | Stop reason: SURG

## 2019-01-01 RX ORDER — DOCUSATE SODIUM 100 MG/1
100 CAPSULE, LIQUID FILLED ORAL 2 TIMES DAILY PRN
Status: DISCONTINUED | OUTPATIENT
Start: 2019-01-01 | End: 2019-01-01

## 2019-01-01 RX ORDER — HEPARIN SODIUM 10000 [USP'U]/100ML
INJECTION, SOLUTION INTRAVENOUS CONTINUOUS PRN
Status: DISCONTINUED | OUTPATIENT
Start: 2019-01-01 | End: 2019-01-01 | Stop reason: SURG

## 2019-01-01 RX ORDER — HEPARIN SODIUM 10000 [USP'U]/100ML
3-30 INJECTION, SOLUTION INTRAVENOUS
Status: DISCONTINUED | OUTPATIENT
Start: 2019-01-01 | End: 2019-01-01 | Stop reason: HOSPADM

## 2019-01-01 RX ORDER — METOPROLOL TARTRATE 5 MG/5ML
5 INJECTION INTRAVENOUS EVERY 6 HOURS PRN
Status: DISCONTINUED | OUTPATIENT
Start: 2019-01-01 | End: 2019-01-01

## 2019-01-01 RX ORDER — HYDROMORPHONE HCL/PF 1 MG/ML
0.25 SYRINGE (ML) INJECTION
Status: DISCONTINUED | OUTPATIENT
Start: 2019-01-01 | End: 2019-01-01 | Stop reason: HOSPADM

## 2019-01-01 RX ORDER — NEOSTIGMINE METHYLSULFATE 1 MG/ML
INJECTION INTRAVENOUS AS NEEDED
Status: DISCONTINUED | OUTPATIENT
Start: 2019-01-01 | End: 2019-01-01 | Stop reason: SURG

## 2019-01-01 RX ORDER — POTASSIUM CHLORIDE 29.8 MG/ML
40 INJECTION INTRAVENOUS ONCE
Status: DISCONTINUED | OUTPATIENT
Start: 2019-01-01 | End: 2019-01-01

## 2019-01-01 RX ORDER — LEVOTHYROXINE SODIUM 0.05 MG/1
50 TABLET ORAL
Status: DISCONTINUED | OUTPATIENT
Start: 2019-01-01 | End: 2019-01-01

## 2019-01-01 RX ORDER — GABAPENTIN 100 MG/1
100 CAPSULE ORAL 3 TIMES DAILY
Status: DISCONTINUED | OUTPATIENT
Start: 2019-01-01 | End: 2019-01-01

## 2019-01-01 RX ORDER — POTASSIUM CHLORIDE 20 MEQ/1
40 TABLET, EXTENDED RELEASE ORAL ONCE
Status: COMPLETED | OUTPATIENT
Start: 2019-01-01 | End: 2019-01-01

## 2019-01-01 RX ORDER — HEPARIN SODIUM 1000 [USP'U]/ML
INJECTION, SOLUTION INTRAVENOUS; SUBCUTANEOUS AS NEEDED
Status: DISCONTINUED | OUTPATIENT
Start: 2019-01-01 | End: 2019-01-01 | Stop reason: SURG

## 2019-01-01 RX ORDER — ONDANSETRON 2 MG/ML
4 INJECTION INTRAMUSCULAR; INTRAVENOUS EVERY 4 HOURS PRN
Status: DISCONTINUED | OUTPATIENT
Start: 2019-01-01 | End: 2019-01-01 | Stop reason: SDUPTHER

## 2019-01-01 RX ORDER — HYDROMORPHONE HCL/PF 1 MG/ML
0.5 SYRINGE (ML) INJECTION
Status: DISCONTINUED | OUTPATIENT
Start: 2019-01-01 | End: 2019-01-01

## 2019-01-01 RX ORDER — FENTANYL CITRATE/PF 50 MCG/ML
25 SYRINGE (ML) INJECTION
Status: COMPLETED | OUTPATIENT
Start: 2019-01-01 | End: 2019-01-01

## 2019-01-01 RX ORDER — HEPARIN SODIUM 1000 [USP'U]/ML
9200 INJECTION, SOLUTION INTRAVENOUS; SUBCUTANEOUS AS NEEDED
Status: DISCONTINUED | OUTPATIENT
Start: 2019-01-01 | End: 2019-01-01

## 2019-01-01 RX ORDER — OXYCODONE HYDROCHLORIDE 5 MG/1
2.5 TABLET ORAL EVERY 4 HOURS PRN
Status: DISCONTINUED | OUTPATIENT
Start: 2019-01-01 | End: 2019-01-01

## 2019-01-01 RX ORDER — METOCLOPRAMIDE HYDROCHLORIDE 5 MG/ML
10 INJECTION INTRAMUSCULAR; INTRAVENOUS ONCE AS NEEDED
Status: DISCONTINUED | OUTPATIENT
Start: 2019-01-01 | End: 2019-01-01 | Stop reason: HOSPADM

## 2019-01-01 RX ORDER — MORPHINE SULFATE 4 MG/ML
4 INJECTION, SOLUTION INTRAMUSCULAR; INTRAVENOUS 4 TIMES DAILY
Status: DISCONTINUED | OUTPATIENT
Start: 2019-01-01 | End: 2019-01-01 | Stop reason: HOSPADM

## 2019-01-01 RX ORDER — SODIUM CHLORIDE 9 MG/ML
INJECTION, SOLUTION INTRAVENOUS CONTINUOUS PRN
Status: DISCONTINUED | OUTPATIENT
Start: 2019-01-01 | End: 2019-01-01

## 2019-01-01 RX ORDER — OXYCODONE HYDROCHLORIDE 5 MG/1
TABLET ORAL
Status: COMPLETED
Start: 2019-01-01 | End: 2019-01-01

## 2019-01-01 RX ORDER — QUETIAPINE FUMARATE 25 MG/1
25 TABLET, FILM COATED ORAL
Status: DISCONTINUED | OUTPATIENT
Start: 2019-01-01 | End: 2019-01-01

## 2019-01-01 RX ORDER — OXYCODONE HYDROCHLORIDE AND ACETAMINOPHEN 5; 325 MG/1; MG/1
1 TABLET ORAL EVERY 4 HOURS PRN
Status: DISCONTINUED | OUTPATIENT
Start: 2019-01-01 | End: 2019-01-01

## 2019-01-01 RX ORDER — ACETAMINOPHEN 325 MG/1
650 TABLET ORAL EVERY 6 HOURS PRN
Status: DISCONTINUED | OUTPATIENT
Start: 2019-01-01 | End: 2019-01-01

## 2019-01-01 RX ORDER — LIDOCAINE HYDROCHLORIDE 10 MG/ML
INJECTION, SOLUTION INFILTRATION; PERINEURAL AS NEEDED
Status: DISCONTINUED | OUTPATIENT
Start: 2019-01-01 | End: 2019-01-01 | Stop reason: SURG

## 2019-01-01 RX ORDER — POTASSIUM CHLORIDE 20 MEQ/1
40 TABLET, EXTENDED RELEASE ORAL 2 TIMES DAILY
Status: DISCONTINUED | OUTPATIENT
Start: 2019-01-01 | End: 2019-01-01

## 2019-01-01 RX ORDER — METOPROLOL TARTRATE 50 MG/1
50 TABLET, FILM COATED ORAL EVERY 12 HOURS SCHEDULED
Status: DISCONTINUED | OUTPATIENT
Start: 2019-01-01 | End: 2019-01-01

## 2019-01-01 RX ORDER — SODIUM CHLORIDE, SODIUM GLUCONATE, SODIUM ACETATE, POTASSIUM CHLORIDE, MAGNESIUM CHLORIDE, SODIUM PHOSPHATE, DIBASIC, AND POTASSIUM PHOSPHATE .53; .5; .37; .037; .03; .012; .00082 G/100ML; G/100ML; G/100ML; G/100ML; G/100ML; G/100ML; G/100ML
50 INJECTION, SOLUTION INTRAVENOUS CONTINUOUS
Status: DISCONTINUED | OUTPATIENT
Start: 2019-01-01 | End: 2019-01-01

## 2019-01-01 RX ORDER — MORPHINE SULFATE 4 MG/ML
4 INJECTION, SOLUTION INTRAMUSCULAR; INTRAVENOUS
Status: DISCONTINUED | OUTPATIENT
Start: 2019-01-01 | End: 2019-01-01 | Stop reason: HOSPADM

## 2019-01-01 RX ORDER — OXYCODONE HYDROCHLORIDE 5 MG/1
5 TABLET ORAL 3 TIMES DAILY
Status: DISCONTINUED | OUTPATIENT
Start: 2019-01-01 | End: 2019-01-01

## 2019-01-01 RX ORDER — HYDROMORPHONE HCL/PF 1 MG/ML
0.5 SYRINGE (ML) INJECTION EVERY 4 HOURS PRN
Status: DISCONTINUED | OUTPATIENT
Start: 2019-01-01 | End: 2019-01-01

## 2019-01-01 RX ADMIN — OXYCODONE HYDROCHLORIDE 10 MG: 10 TABLET ORAL at 23:53

## 2019-01-01 RX ADMIN — HEPARIN SODIUM 10 UNITS/KG/HR: 10000 INJECTION, SOLUTION INTRAVENOUS at 01:12

## 2019-01-01 RX ADMIN — PROPOFOL 140 MG: 10 INJECTION, EMULSION INTRAVENOUS at 14:51

## 2019-01-01 RX ADMIN — POTASSIUM CHLORIDE 40 MEQ: 1500 TABLET, EXTENDED RELEASE ORAL at 22:49

## 2019-01-01 RX ADMIN — SODIUM CHLORIDE, SODIUM GLUCONATE, SODIUM ACETATE, POTASSIUM CHLORIDE AND MAGNESIUM CHLORIDE 100 ML/HR: 526; 502; 368; 37; 30 INJECTION, SOLUTION INTRAVENOUS at 04:26

## 2019-01-01 RX ADMIN — HEPARIN SODIUM 4600 UNITS: 1000 INJECTION INTRAVENOUS; SUBCUTANEOUS at 21:07

## 2019-01-01 RX ADMIN — OXYCODONE HYDROCHLORIDE 5 MG: 5 TABLET ORAL at 17:45

## 2019-01-01 RX ADMIN — METOPROLOL TARTRATE 25 MG: 25 TABLET, FILM COATED ORAL at 11:10

## 2019-01-01 RX ADMIN — GABAPENTIN 100 MG: 100 CAPSULE ORAL at 17:08

## 2019-01-01 RX ADMIN — LABETALOL HYDROCHLORIDE 5 MG: 5 INJECTION, SOLUTION INTRAVENOUS at 17:45

## 2019-01-01 RX ADMIN — INSULIN HUMAN 3 UNITS: 100 INJECTION, SOLUTION PARENTERAL at 18:07

## 2019-01-01 RX ADMIN — FENTANYL CITRATE 25 MCG: 50 INJECTION, SOLUTION INTRAMUSCULAR; INTRAVENOUS at 18:05

## 2019-01-01 RX ADMIN — SODIUM CHLORIDE 5 UNITS/HR: 9 INJECTION, SOLUTION INTRAVENOUS at 06:13

## 2019-01-01 RX ADMIN — SODIUM CHLORIDE, SODIUM GLUCONATE, SODIUM ACETATE, POTASSIUM CHLORIDE, MAGNESIUM CHLORIDE, SODIUM PHOSPHATE, DIBASIC, AND POTASSIUM PHOSPHATE 200 ML/HR: .53; .5; .37; .037; .03; .012; .00082 INJECTION, SOLUTION INTRAVENOUS at 05:46

## 2019-01-01 RX ADMIN — POTASSIUM CHLORIDE 20 MEQ: 200 INJECTION, SOLUTION INTRAVENOUS at 08:33

## 2019-01-01 RX ADMIN — SODIUM CHLORIDE, SODIUM GLUCONATE, SODIUM ACETATE, POTASSIUM CHLORIDE AND MAGNESIUM CHLORIDE 100 ML/HR: 526; 502; 368; 37; 30 INJECTION, SOLUTION INTRAVENOUS at 19:47

## 2019-01-01 RX ADMIN — HEPARIN SODIUM 18 UNITS/KG/HR: 10000 INJECTION, SOLUTION INTRAVENOUS at 14:45

## 2019-01-01 RX ADMIN — OXYCODONE HYDROCHLORIDE 2.5 MG: 5 TABLET ORAL at 21:09

## 2019-01-01 RX ADMIN — CALCIUM GLUCONATE 2 G: 98 INJECTION, SOLUTION INTRAVENOUS at 09:34

## 2019-01-01 RX ADMIN — MELATONIN 6 MG: at 21:44

## 2019-01-01 RX ADMIN — ONDANSETRON 4 MG: 2 INJECTION INTRAMUSCULAR; INTRAVENOUS at 17:00

## 2019-01-01 RX ADMIN — HEPARIN SODIUM 4600 UNITS: 1000 INJECTION INTRAVENOUS; SUBCUTANEOUS at 01:10

## 2019-01-01 RX ADMIN — METOPROLOL TARTRATE 50 MG: 50 TABLET, FILM COATED ORAL at 10:48

## 2019-01-01 RX ADMIN — OXYCODONE HYDROCHLORIDE 5 MG: 5 TABLET ORAL at 03:12

## 2019-01-01 RX ADMIN — SODIUM CHLORIDE 3 UNITS/HR: 9 INJECTION, SOLUTION INTRAVENOUS at 22:49

## 2019-01-01 RX ADMIN — OXYCODONE HYDROCHLORIDE 5 MG: 5 TABLET ORAL at 12:35

## 2019-01-01 RX ADMIN — MORPHINE SULFATE 4 MG: 4 INJECTION INTRAVENOUS at 02:05

## 2019-01-01 RX ADMIN — HEPARIN SODIUM 4600 UNITS: 1000 INJECTION INTRAVENOUS; SUBCUTANEOUS at 05:28

## 2019-01-01 RX ADMIN — POTASSIUM CHLORIDE 40 MEQ: 1500 TABLET, EXTENDED RELEASE ORAL at 17:08

## 2019-01-01 RX ADMIN — GABAPENTIN 100 MG: 100 CAPSULE ORAL at 08:32

## 2019-01-01 RX ADMIN — PERFLUTREN 0.8 ML/MIN: 6.52 INJECTION, SUSPENSION INTRAVENOUS at 15:56

## 2019-01-01 RX ADMIN — HYDROMORPHONE HYDROCHLORIDE 0.5 MG: 1 INJECTION, SOLUTION INTRAMUSCULAR; INTRAVENOUS; SUBCUTANEOUS at 21:45

## 2019-01-01 RX ADMIN — SODIUM CHLORIDE, SODIUM GLUCONATE, SODIUM ACETATE, POTASSIUM CHLORIDE AND MAGNESIUM CHLORIDE 100 ML/HR: 526; 502; 368; 37; 30 INJECTION, SOLUTION INTRAVENOUS at 01:51

## 2019-01-01 RX ADMIN — SODIUM CHLORIDE 3 UNITS/HR: 9 INJECTION, SOLUTION INTRAVENOUS at 12:41

## 2019-01-01 RX ADMIN — HYDROMORPHONE HYDROCHLORIDE 0.5 MG: 1 INJECTION, SOLUTION INTRAMUSCULAR; INTRAVENOUS; SUBCUTANEOUS at 01:45

## 2019-01-01 RX ADMIN — LEVOTHYROXINE SODIUM 50 MCG: 50 TABLET ORAL at 05:31

## 2019-01-01 RX ADMIN — FENTANYL CITRATE 25 MCG: 50 INJECTION, SOLUTION INTRAMUSCULAR; INTRAVENOUS at 18:30

## 2019-01-01 RX ADMIN — METOPROLOL TARTRATE 25 MG: 25 TABLET, FILM COATED ORAL at 10:52

## 2019-01-01 RX ADMIN — GABAPENTIN 100 MG: 100 CAPSULE ORAL at 21:34

## 2019-01-01 RX ADMIN — OXYCODONE HYDROCHLORIDE 2.5 MG: 5 TABLET ORAL at 00:24

## 2019-01-01 RX ADMIN — HEPARIN SODIUM 9200 UNITS: 1000 INJECTION, SOLUTION INTRAVENOUS; SUBCUTANEOUS at 12:49

## 2019-01-01 RX ADMIN — ATORVASTATIN CALCIUM 40 MG: 40 TABLET, FILM COATED ORAL at 20:27

## 2019-01-01 RX ADMIN — DULOXETINE HYDROCHLORIDE 30 MG: 30 CAPSULE, DELAYED RELEASE ORAL at 08:34

## 2019-01-01 RX ADMIN — OXYCODONE HYDROCHLORIDE 5 MG: 5 TABLET ORAL at 05:58

## 2019-01-01 RX ADMIN — GABAPENTIN 100 MG: 100 CAPSULE ORAL at 16:05

## 2019-01-01 RX ADMIN — GABAPENTIN 100 MG: 100 CAPSULE ORAL at 21:47

## 2019-01-01 RX ADMIN — OXYCODONE HYDROCHLORIDE 5 MG: 5 TABLET ORAL at 08:39

## 2019-01-01 RX ADMIN — OXYCODONE HYDROCHLORIDE 5 MG: 5 TABLET ORAL at 05:31

## 2019-01-01 RX ADMIN — HYDROMORPHONE HYDROCHLORIDE 0.5 MG: 1 INJECTION, SOLUTION INTRAMUSCULAR; INTRAVENOUS; SUBCUTANEOUS at 19:30

## 2019-01-01 RX ADMIN — LABETALOL HYDROCHLORIDE 5 MG: 5 INJECTION, SOLUTION INTRAVENOUS at 17:38

## 2019-01-01 RX ADMIN — HEPARIN SODIUM 11 UNITS/KG/HR: 10000 INJECTION, SOLUTION INTRAVENOUS at 17:58

## 2019-01-01 RX ADMIN — POTASSIUM CHLORIDE 20 MEQ: 200 INJECTION, SOLUTION INTRAVENOUS at 05:51

## 2019-01-01 RX ADMIN — SODIUM CHLORIDE: 0.9 INJECTION, SOLUTION INTRAVENOUS at 14:46

## 2019-01-01 RX ADMIN — METOPROLOL TARTRATE 5 MG: 5 INJECTION, SOLUTION INTRAVENOUS at 12:40

## 2019-01-01 RX ADMIN — GABAPENTIN 100 MG: 100 CAPSULE ORAL at 20:18

## 2019-01-01 RX ADMIN — MORPHINE SULFATE 4 MG: 4 INJECTION INTRAVENOUS at 18:40

## 2019-01-01 RX ADMIN — LEVOTHYROXINE SODIUM 50 MCG: 50 TABLET ORAL at 05:22

## 2019-01-01 RX ADMIN — OXYCODONE HYDROCHLORIDE 5 MG: 5 TABLET ORAL at 19:45

## 2019-01-01 RX ADMIN — GABAPENTIN 100 MG: 100 CAPSULE ORAL at 20:27

## 2019-01-01 RX ADMIN — HEPARIN SODIUM 8 UNITS/KG/HR: 10000 INJECTION, SOLUTION INTRAVENOUS at 04:26

## 2019-01-01 RX ADMIN — GABAPENTIN 100 MG: 100 CAPSULE ORAL at 08:39

## 2019-01-01 RX ADMIN — SENNOSIDES AND DOCUSATE SODIUM 2 TABLET: 8.6; 5 TABLET ORAL at 08:41

## 2019-01-01 RX ADMIN — DULOXETINE HYDROCHLORIDE 30 MG: 30 CAPSULE, DELAYED RELEASE ORAL at 09:45

## 2019-01-01 RX ADMIN — HYDROMORPHONE HYDROCHLORIDE 0.5 MG: 1 INJECTION, SOLUTION INTRAMUSCULAR; INTRAVENOUS; SUBCUTANEOUS at 20:35

## 2019-01-01 RX ADMIN — METOPROLOL TARTRATE 50 MG: 50 TABLET, FILM COATED ORAL at 20:18

## 2019-01-01 RX ADMIN — SODIUM CHLORIDE, SODIUM GLUCONATE, SODIUM ACETATE, POTASSIUM CHLORIDE, MAGNESIUM CHLORIDE, SODIUM PHOSPHATE, DIBASIC, AND POTASSIUM PHOSPHATE 500 ML: .53; .5; .37; .037; .03; .012; .00082 INJECTION, SOLUTION INTRAVENOUS at 04:10

## 2019-01-01 RX ADMIN — SODIUM CHLORIDE, SODIUM GLUCONATE, SODIUM ACETATE, POTASSIUM CHLORIDE AND MAGNESIUM CHLORIDE 1000 ML: 526; 502; 368; 37; 30 INJECTION, SOLUTION INTRAVENOUS at 23:52

## 2019-01-01 RX ADMIN — LEVOTHYROXINE SODIUM 50 MCG: 50 TABLET ORAL at 05:05

## 2019-01-01 RX ADMIN — HEPARIN SODIUM 4600 UNITS: 1000 INJECTION INTRAVENOUS; SUBCUTANEOUS at 22:00

## 2019-01-01 RX ADMIN — METOPROLOL TARTRATE 50 MG: 50 TABLET, FILM COATED ORAL at 08:32

## 2019-01-01 RX ADMIN — GABAPENTIN 100 MG: 100 CAPSULE ORAL at 20:09

## 2019-01-01 RX ADMIN — METOPROLOL TARTRATE 75 MG: 50 TABLET, FILM COATED ORAL at 21:08

## 2019-01-01 RX ADMIN — SUCCINYLCHOLINE CHLORIDE 120 MG: 20 INJECTION, SOLUTION INTRAMUSCULAR; INTRAVENOUS at 14:51

## 2019-01-01 RX ADMIN — SODIUM CHLORIDE, SODIUM GLUCONATE, SODIUM ACETATE, POTASSIUM CHLORIDE, MAGNESIUM CHLORIDE, SODIUM PHOSPHATE, DIBASIC, AND POTASSIUM PHOSPHATE 500 ML: .53; .5; .37; .037; .03; .012; .00082 INJECTION, SOLUTION INTRAVENOUS at 01:12

## 2019-01-01 RX ADMIN — SODIUM CHLORIDE 2 UNITS/HR: 9 INJECTION, SOLUTION INTRAVENOUS at 06:11

## 2019-01-01 RX ADMIN — SODIUM CHLORIDE, SODIUM GLUCONATE, SODIUM ACETATE, POTASSIUM CHLORIDE, MAGNESIUM CHLORIDE, SODIUM PHOSPHATE, DIBASIC, AND POTASSIUM PHOSPHATE 200 ML/HR: .53; .5; .37; .037; .03; .012; .00082 INJECTION, SOLUTION INTRAVENOUS at 01:41

## 2019-01-01 RX ADMIN — SODIUM CHLORIDE 75 ML/HR: 0.9 INJECTION, SOLUTION INTRAVENOUS at 18:30

## 2019-01-01 RX ADMIN — OXYCODONE HYDROCHLORIDE 2.5 MG: 5 TABLET ORAL at 17:09

## 2019-01-01 RX ADMIN — SODIUM CHLORIDE 1000 ML: 0.9 INJECTION, SOLUTION INTRAVENOUS at 13:51

## 2019-01-01 RX ADMIN — MORPHINE SULFATE 4 MG: 4 INJECTION INTRAVENOUS at 21:27

## 2019-01-01 RX ADMIN — SODIUM CHLORIDE, SODIUM GLUCONATE, SODIUM ACETATE, POTASSIUM CHLORIDE AND MAGNESIUM CHLORIDE 50 ML/HR: 526; 502; 368; 37; 30 INJECTION, SOLUTION INTRAVENOUS at 14:20

## 2019-01-01 RX ADMIN — QUETIAPINE FUMARATE 25 MG: 25 TABLET ORAL at 21:46

## 2019-01-01 RX ADMIN — QUETIAPINE FUMARATE 50 MG: 25 TABLET ORAL at 22:50

## 2019-01-01 RX ADMIN — OXYCODONE HYDROCHLORIDE 2.5 MG: 5 TABLET ORAL at 12:40

## 2019-01-01 RX ADMIN — SENNOSIDES AND DOCUSATE SODIUM 2 TABLET: 8.6; 5 TABLET ORAL at 08:33

## 2019-01-01 RX ADMIN — SODIUM CHLORIDE, SODIUM GLUCONATE, SODIUM ACETATE, POTASSIUM CHLORIDE AND MAGNESIUM CHLORIDE 100 ML/HR: 526; 502; 368; 37; 30 INJECTION, SOLUTION INTRAVENOUS at 10:20

## 2019-01-01 RX ADMIN — GABAPENTIN 100 MG: 100 CAPSULE ORAL at 21:02

## 2019-01-01 RX ADMIN — POTASSIUM CHLORIDE 40 MEQ: 1500 TABLET, EXTENDED RELEASE ORAL at 16:05

## 2019-01-01 RX ADMIN — HEPARIN SODIUM 11 UNITS/KG/HR: 10000 INJECTION, SOLUTION INTRAVENOUS at 14:19

## 2019-01-01 RX ADMIN — SODIUM CHLORIDE, SODIUM GLUCONATE, SODIUM ACETATE, POTASSIUM CHLORIDE, MAGNESIUM CHLORIDE, SODIUM PHOSPHATE, DIBASIC, AND POTASSIUM PHOSPHATE 500 ML: .53; .5; .37; .037; .03; .012; .00082 INJECTION, SOLUTION INTRAVENOUS at 06:13

## 2019-01-01 RX ADMIN — GABAPENTIN 100 MG: 100 CAPSULE ORAL at 17:00

## 2019-01-01 RX ADMIN — HYDROMORPHONE HYDROCHLORIDE 0.5 MG: 1 INJECTION, SOLUTION INTRAMUSCULAR; INTRAVENOUS; SUBCUTANEOUS at 15:08

## 2019-01-01 RX ADMIN — CEFAZOLIN SODIUM 3000 MG: 1 INJECTION, POWDER, FOR SOLUTION INTRAMUSCULAR; INTRAVENOUS at 15:10

## 2019-01-01 RX ADMIN — GABAPENTIN 100 MG: 100 CAPSULE ORAL at 10:48

## 2019-01-01 RX ADMIN — MELATONIN 6 MG: at 21:47

## 2019-01-01 RX ADMIN — HYDROMORPHONE HYDROCHLORIDE 0.25 MG: 1 INJECTION, SOLUTION INTRAMUSCULAR; INTRAVENOUS; SUBCUTANEOUS at 18:35

## 2019-01-01 RX ADMIN — MELATONIN 6 MG: at 21:02

## 2019-01-01 RX ADMIN — HEPARIN SODIUM 12 UNITS/KG/HR: 10000 INJECTION, SOLUTION INTRAVENOUS at 05:46

## 2019-01-01 RX ADMIN — ALBUMIN (HUMAN): 12.5 SOLUTION INTRAVENOUS at 16:39

## 2019-01-01 RX ADMIN — SODIUM CHLORIDE, SODIUM GLUCONATE, SODIUM ACETATE, POTASSIUM CHLORIDE AND MAGNESIUM CHLORIDE 50 ML/HR: 526; 502; 368; 37; 30 INJECTION, SOLUTION INTRAVENOUS at 09:43

## 2019-01-01 RX ADMIN — HYDROMORPHONE HYDROCHLORIDE 0.5 MG: 1 INJECTION, SOLUTION INTRAMUSCULAR; INTRAVENOUS; SUBCUTANEOUS at 06:20

## 2019-01-01 RX ADMIN — METOPROLOL TARTRATE 50 MG: 50 TABLET, FILM COATED ORAL at 20:09

## 2019-01-01 RX ADMIN — MORPHINE SULFATE 4 MG: 4 INJECTION INTRAVENOUS at 09:59

## 2019-01-01 RX ADMIN — ACETAMINOPHEN 650 MG: 325 TABLET, FILM COATED ORAL at 22:49

## 2019-01-01 RX ADMIN — METOPROLOL TARTRATE 50 MG: 50 TABLET, FILM COATED ORAL at 08:38

## 2019-01-01 RX ADMIN — POTASSIUM CHLORIDE 20 MEQ: 1500 TABLET, EXTENDED RELEASE ORAL at 01:42

## 2019-01-01 RX ADMIN — HEPARIN SODIUM 5000 UNITS: 1000 INJECTION INTRAVENOUS; SUBCUTANEOUS at 15:36

## 2019-01-01 RX ADMIN — POTASSIUM CHLORIDE: 200 INJECTION, SOLUTION INTRAVENOUS at 15:15

## 2019-01-01 RX ADMIN — SODIUM CHLORIDE 8 UNITS/HR: 9 INJECTION, SOLUTION INTRAVENOUS at 21:10

## 2019-01-01 RX ADMIN — GLYCOPYRROLATE 0.4 MG: 0.2 INJECTION, SOLUTION INTRAMUSCULAR; INTRAVENOUS at 17:43

## 2019-01-01 RX ADMIN — HYDROMORPHONE HYDROCHLORIDE 0.5 MG: 1 INJECTION, SOLUTION INTRAMUSCULAR; INTRAVENOUS; SUBCUTANEOUS at 07:28

## 2019-01-01 RX ADMIN — LIDOCAINE HYDROCHLORIDE 50 MG: 10 INJECTION, SOLUTION INFILTRATION; PERINEURAL at 14:51

## 2019-01-01 RX ADMIN — HEPARIN SODIUM 4600 UNITS: 1000 INJECTION INTRAVENOUS; SUBCUTANEOUS at 10:53

## 2019-01-01 RX ADMIN — GABAPENTIN 100 MG: 100 CAPSULE ORAL at 09:46

## 2019-01-01 RX ADMIN — METOPROLOL TARTRATE 100 MG: 50 TABLET, FILM COATED ORAL at 21:47

## 2019-01-01 RX ADMIN — METOPROLOL TARTRATE 50 MG: 50 TABLET, FILM COATED ORAL at 21:02

## 2019-01-01 RX ADMIN — METOPROLOL TARTRATE 50 MG: 50 TABLET, FILM COATED ORAL at 21:34

## 2019-01-01 RX ADMIN — INSULIN LISPRO 10 UNITS: 100 INJECTION, SOLUTION INTRAVENOUS; SUBCUTANEOUS at 20:30

## 2019-01-01 RX ADMIN — POTASSIUM CHLORIDE 40 MEQ: 1500 TABLET, EXTENDED RELEASE ORAL at 09:46

## 2019-01-01 RX ADMIN — GABAPENTIN 100 MG: 100 CAPSULE ORAL at 21:08

## 2019-01-01 RX ADMIN — OXYCODONE HYDROCHLORIDE 5 MG: 5 TABLET ORAL at 07:54

## 2019-01-01 RX ADMIN — POTASSIUM CHLORIDE 40 MEQ: 1500 TABLET, EXTENDED RELEASE ORAL at 08:40

## 2019-01-01 RX ADMIN — INSULIN LISPRO 4 UNITS: 100 INJECTION, SOLUTION INTRAVENOUS; SUBCUTANEOUS at 11:07

## 2019-01-01 RX ADMIN — GABAPENTIN 100 MG: 100 CAPSULE ORAL at 17:18

## 2019-01-01 RX ADMIN — HEPARIN SODIUM 9 UNITS/KG/HR: 10000 INJECTION, SOLUTION INTRAVENOUS at 21:41

## 2019-01-01 RX ADMIN — METOPROLOL TARTRATE 5 MG: 5 INJECTION, SOLUTION INTRAVENOUS at 06:48

## 2019-01-01 RX ADMIN — SODIUM CHLORIDE 8 UNITS/HR: 9 INJECTION, SOLUTION INTRAVENOUS at 18:12

## 2019-01-01 RX ADMIN — SODIUM CHLORIDE, SODIUM GLUCONATE, SODIUM ACETATE, POTASSIUM CHLORIDE AND MAGNESIUM CHLORIDE 100 ML/HR: 526; 502; 368; 37; 30 INJECTION, SOLUTION INTRAVENOUS at 22:48

## 2019-01-01 RX ADMIN — FENTANYL CITRATE 25 MCG: 50 INJECTION, SOLUTION INTRAMUSCULAR; INTRAVENOUS at 18:20

## 2019-01-01 RX ADMIN — FENTANYL CITRATE 25 MCG: 50 INJECTION, SOLUTION INTRAMUSCULAR; INTRAVENOUS at 18:10

## 2019-01-01 RX ADMIN — NEOSTIGMINE METHYLSULFATE 3 MG: 1 INJECTION INTRAVENOUS at 17:43

## 2019-01-01 RX ADMIN — DULOXETINE HYDROCHLORIDE 30 MG: 30 CAPSULE, DELAYED RELEASE ORAL at 08:38

## 2019-01-01 RX ADMIN — DULOXETINE HYDROCHLORIDE 30 MG: 30 CAPSULE, DELAYED RELEASE ORAL at 08:13

## 2019-01-01 RX ADMIN — OXYCODONE HYDROCHLORIDE 5 MG: 5 TABLET ORAL at 19:56

## 2019-01-01 RX ADMIN — IOHEXOL 100 ML: 350 INJECTION, SOLUTION INTRAVENOUS at 13:35

## 2019-01-01 RX ADMIN — INSULIN DETEMIR 50 UNITS: 100 INJECTION, SOLUTION SUBCUTANEOUS at 21:45

## 2019-01-01 RX ADMIN — Medication: at 02:45

## 2019-01-01 RX ADMIN — SODIUM CHLORIDE, SODIUM GLUCONATE, SODIUM ACETATE, POTASSIUM CHLORIDE, MAGNESIUM CHLORIDE, SODIUM PHOSPHATE, DIBASIC, AND POTASSIUM PHOSPHATE 1000 ML: .53; .5; .37; .037; .03; .012; .00082 INJECTION, SOLUTION INTRAVENOUS at 04:19

## 2019-01-01 RX ADMIN — LEVOTHYROXINE SODIUM 50 MCG: 50 TABLET ORAL at 10:48

## 2019-01-01 RX ADMIN — POTASSIUM CHLORIDE 20 MEQ: 1500 TABLET, EXTENDED RELEASE ORAL at 09:30

## 2019-01-01 RX ADMIN — SENNOSIDES AND DOCUSATE SODIUM 2 TABLET: 8.6; 5 TABLET ORAL at 08:12

## 2019-01-01 RX ADMIN — HEPARIN SODIUM 11 UNITS/KG/HR: 10000 INJECTION, SOLUTION INTRAVENOUS at 09:42

## 2019-01-01 RX ADMIN — METOPROLOL TARTRATE 75 MG: 50 TABLET, FILM COATED ORAL at 09:45

## 2019-01-01 RX ADMIN — CHLORHEXIDINE GLUCONATE 15 ML: 1.2 RINSE ORAL at 21:34

## 2019-01-01 RX ADMIN — OXYCODONE HYDROCHLORIDE 5 MG: 5 TABLET ORAL at 21:03

## 2019-01-01 RX ADMIN — GABAPENTIN 100 MG: 100 CAPSULE ORAL at 08:12

## 2019-01-01 RX ADMIN — GABAPENTIN 100 MG: 100 CAPSULE ORAL at 08:33

## 2019-01-01 RX ADMIN — SODIUM CHLORIDE 2 UNITS/HR: 9 INJECTION, SOLUTION INTRAVENOUS at 13:38

## 2019-01-01 RX ADMIN — ROCURONIUM BROMIDE 70 MG: 10 INJECTION INTRAVENOUS at 15:01

## 2019-01-01 RX ADMIN — MELATONIN 6 MG: at 21:08

## 2019-01-01 RX ADMIN — SODIUM CHLORIDE, SODIUM GLUCONATE, SODIUM ACETATE, POTASSIUM CHLORIDE AND MAGNESIUM CHLORIDE 100 ML/HR: 526; 502; 368; 37; 30 INJECTION, SOLUTION INTRAVENOUS at 18:12

## 2019-01-01 RX ADMIN — OXYCODONE HYDROCHLORIDE 5 MG: 5 TABLET ORAL at 20:30

## 2019-01-01 RX ADMIN — OXYCODONE HYDROCHLORIDE 10 MG: 10 TABLET ORAL at 17:45

## 2019-01-01 RX ADMIN — SODIUM CHLORIDE, SODIUM GLUCONATE, SODIUM ACETATE, POTASSIUM CHLORIDE AND MAGNESIUM CHLORIDE 100 ML/HR: 526; 502; 368; 37; 30 INJECTION, SOLUTION INTRAVENOUS at 15:00

## 2019-01-01 RX ADMIN — OXYCODONE HYDROCHLORIDE 5 MG: 5 TABLET ORAL at 21:47

## 2019-01-01 RX ADMIN — SODIUM CHLORIDE, SODIUM GLUCONATE, SODIUM ACETATE, POTASSIUM CHLORIDE AND MAGNESIUM CHLORIDE 100 ML/HR: 526; 502; 368; 37; 30 INJECTION, SOLUTION INTRAVENOUS at 05:35

## 2019-01-01 RX ADMIN — METOPROLOL TARTRATE 50 MG: 50 TABLET, FILM COATED ORAL at 08:12

## 2019-01-01 RX ADMIN — HEPARIN SODIUM AND DEXTROSE 18 UNITS/KG/HR: 10000; 5 INJECTION INTRAVENOUS at 12:53

## 2019-01-01 RX ADMIN — METOPROLOL TARTRATE 50 MG: 50 TABLET, FILM COATED ORAL at 08:33

## 2019-01-01 RX ADMIN — HEPARIN SODIUM 4600 UNITS: 1000 INJECTION INTRAVENOUS; SUBCUTANEOUS at 14:35

## 2019-02-04 PROBLEM — I99.8 ISCHEMIA OF BOTH LOWER EXTREMITIES: Status: ACTIVE | Noted: 2019-01-01

## 2019-02-04 NOTE — ANESTHESIA POSTPROCEDURE EVALUATION
Post-Op Assessment Note      CV Status:  Stable    Mental Status:  Alert and awake    Hydration Status:  Euvolemic    PONV Controlled:  Controlled    Airway Patency:  Patent    Post Op Vitals Reviewed: Yes          Staff: CRNA           /67 (02/04/19 1757)    Temp 97 8 °F (36 6 °C) (02/04/19 1757)    Pulse 74 (02/04/19 1757)   Resp 18 (02/04/19 1757)    SpO2 98 % (02/04/19 1757)

## 2019-02-04 NOTE — ANESTHESIA PREPROCEDURE EVALUATION
Review of Systems/Medical History    Chart reviewed  No history of anesthetic complications     Cardiovascular  Exercise tolerance (METS): >4, Exercise comment: Prior to developing B/L foot pain, pt could get up stairs without stopping though he had to go slow  Denies CP, SOB Hyperlipidemia, Hypertension , CHF ,   Comment: 12/2017 Echo SUMMARY     LEFT VENTRICLE:  Systolic function was mildly to moderately reduced by visual assessment  Ejection fraction was estimated to be 40 %  There was mild diffuse hypokinesis      LEFT ATRIUM:  The atrium was mildly dilated      LEFT ATRIAL APPENDAGE:  The appendage was moderately to markedly dilated  The function was severely reduced (markedly reduced emptying velocity)  There was a small thrombus      ATRIAL SEPTUM:  No defect or patent foramen ovale was identified      MITRAL VALVE:  There was trace regurgitation      TRICUSPID VALVE:  There was trace regurgitation  ,  Pulmonary       GI/Hepatic            Endo/Other  Diabetes type 2 , History of thyroid disease ,      GYN       Hematology   Musculoskeletal    Arthritis     Neurology   Psychology           Physical Exam    Airway    Mallampati score: II  TM Distance: >3 FB  Neck ROM: full     Dental   Comment: Poor dentition but none loose,     Cardiovascular      Pulmonary      Other Findings        Anesthesia Plan  ASA Score- 4 Emergent    Anesthesia Type- general with ASA Monitors  Additional Monitors: arterial line and central venous line  Airway Plan: ETT  Comment: +/- CVL  +/- blood products  Plan Factors-    Induction- intravenous  Postoperative Plan-     Informed Consent- Anesthetic plan and risks discussed with patient  I personally reviewed this patient with the CRNA  Discussed and agreed on the Anesthesia Plan with the CRNA           This note was completed after the fact 2/2 emergent nature of the procedure   However, the chart was reviewed, the pt examined and consent signed with CRNA present prior to anesthetic   Janette Turk MD

## 2019-02-04 NOTE — OP NOTE
OPERATIVE REPORT  PATIENT NAME: Martha Duff    :  1940  MRN: [id number]  Pt Location: [facility] OR ROOM 02    SURGERY DATE: 2019    Surgeon(s) and Role:     * Claudette Biggs MD - Yesy Londono MD - Angel Webster, DO - Assisting     * Nicki Dacosta MD - Primary     * Nicki Dacosta MD - Primary    Preop Diagnosis:  Ischemia of both lower extremities [I99 8]    Post-Op Diagnosis Codes:     * Ischemia of both lower extremities [I99 8]    Procedure(s) (LRB):  Bilateral femoral thromboembolectomy with primary closure, Bilateral 4 compartment fasciotomy (Bilateral)    Specimen(s):  ID Type Source Tests Collected by Time Destination   1 : right femoral thrombus Tissue Thrombus/Embolus TISSUE EXAM Nicki Dacosta MD 2019 1617    2 : left femoral thrombus Tissue Thrombus/Embolus TISSUE EXAM Nicki Dacosta MD 2019 1619        Estimated Blood Loss:   Minimal    Drains:  Negative Pressure Wound Therapy (V A C ) Leg Right; Outer (Active)   Number of days: 0       Negative Pressure Wound Therapy (V A C ) Leg Outer;Left (Active)   Number of days: 0       Urethral Catheter Latex 16 Fr  (Active)   Number of days: 0       Anesthesia Type:   General    Operative Indications:  Ischemia of both lower extremities [I808]  46-year-old with reported history of atrial thrombus had been on anticoagulation but stopped anticoagulation because in made him feel tired   He then noticed acute onset of bilateral lower extremity pain, coldness, numbness and weakness  He was in fact unable to walk for the past 2 days  He only went to the emergency room because his sister told him he had to  Upon evaluation in the emergency room he was noted to be profoundly ischemic in bilateral lower extremities  A CT angiogram was performed to ascertain whether this was aortic occlusion or bilateral infrainguinal occlusions    This CT angiogram did delineate thrombus to start in both distal external iliac arteries bilaterally  Operative Findings:  Large amount of thrombus removed from both common femoral arteries, deep femoral arteries and superficial femoral arteries via transverse incision  Excellent back flow was encountered following embolectomy  Of note embolectomy catheter was passed approximately 60 cm on the right and 40 cm on the left  Excellent inflow was established  At the time of fasciotomy muscle was viable bilaterally  The feet had regained some pink color and sluggish capillary refill at the conclusion of the procedure  Complications:   None    Procedure and Technique:  General anesthesia was administered  Both lower extremities were prepped and draped circumferentially from the umbilicus to the foot  Standard draping was utilized to include Ioban draping  Longitudinal incisions were made over both femoral arteries from the inguinal ligament inferiorly approximately 8 cm  Bovie cautery was used to maintain hemostasis during dissection  Dissection is carried through the subcutaneous tissue with retraction of the lymph node chain medially  The vessels were then identified from the level of the inguinal ligament to approximately 2 cm beyond the femoral bifurcation  Bilateral common femoral, deep femoral and superficial femoral arteries were encircled with vessel loops  The patient had been maintained on a heparin drip from the time of initial evaluation in the emergency room  This heparin drip was stopped and 5000 units of IV heparin were administered  ACT levels were obtained and further heparin boluses were administered periodically to maintain a therapeutic level  Initially on the right side a transverse arteriotomy was created in the distal common femoral artery  A large amount of thrombus was visualized  Four Tristen catheter was passed retrograde with removal of a moderate amount of thrombus and pulsatile inflow    No further thrombus was removed  Three and 4 Tristen catheters were then passed into the 2 deep femoral artery branches with return of a moderate amount of thrombus and good backbleeding  These vessels were flushed with heparinized saline solution  The 4 Tristen catheter was then passed into the superficial femoral artery approximately 60 cm  A large amount of thrombus was removed  This thrombus tapered distally to approximately 3 mm in diameter consistent with tibial cast   Good backbleeding was encountered  A 3 Tristen was then passed in the superficial femoral artery again to the 60 cm issa with no further thrombus removed  Again good backbleeding was encountered  These vessels were flushed with heparinized saline solution  The left femoral arteriotomy was closed with a running 5 0 Prolene suture in the standard fashion  Once this closure was nearly completed the vessel was back bled and flushed appropriately  The closure was completed and flow was restored 1st the deep femoral artery then the superficial femoral artery  No bleeding points were encountered  A similar procedure was then performed on the left by performing a transverse arteriotomy in the distal common femoral artery  Performing a retrograde thrombectomy using 4 Tristen catheters with restoration of good pulsatile inflow  The deep and superficial femoral arteries were then treated in a similar fashion with a combination 3 and 4 Tristen catheters with removal of a moderate amount of thrombus and good backbleeding  Of note Tristen sac could not be passed beyond the 40 cm issa in the superficial femoral artery  The right femoral artery was also closed in a primary fashion with a running 5 0 Prolene suture  Again these vessels were back bled and flushed appropriately before the closure was completed and flow was restored  No bleeding was encountered  Both wounds were irrigated with antibiotic laden solution    Deep tissues were reapproximated with running 2 0 and 3 0 Monocryl suture  Skin was closed with skin clips an appropriate dressing was placed  At this point bilateral 4 compartment fasciotomies were performed via 2 incisions 1 on the medial and 1 on the lateral aspect of the leg  All 4 compartments were visualized and opened throughout the majority of the calf through these 2 incisions  Of note the calf muscle was pink and viable with mild to moderate swelling  Once hemostasis was obtained VAC dressings were placed on all wounds and placed to continuous suction  At this point the would patient was woken from anesthesia and transferred to the recovery room in critical condition       I was present for the entire procedure    Patient Disposition:  PACU     SIGNATURE: Ming Gusman MD  DATE: February 4, 2019  TIME: 5:21 PM

## 2019-02-04 NOTE — ANESTHESIA PROCEDURE NOTES
Arterial Line Insertion  Date/Time: 2/4/2019 2:54 PM  Performed by: Tara De La Torre  Authorized by: Tara De La Torre   Consent: Written consent obtained  Risks and benefits: risks, benefits and alternatives were discussed  Consent given by: patient (ANESTHESIA CONSENT)  Required items: required blood products, implants, devices, and special equipment available  Patient identity confirmed: arm band  Time out: Immediately prior to procedure a "time out" was called to verify the correct patient, procedure, equipment, support staff and site/side marked as required  Preparation: Patient was prepped and draped in the usual sterile fashion  Indications: multiple ABGs and hemodynamic monitoring  Orientation:  Right  Location: radial artery  Sedation:  Patient sedated: geta      Procedure Details:  Needle gauge: 20 (arrowkit)  Number of attempts: 1    Post-procedure:  Post-procedure: dressing applied  Waveform: good waveform  Post-procedure CNS: normal  Patient tolerance: Patient tolerated the procedure well with no immediate complications

## 2019-02-04 NOTE — ED NOTES
No pulses found using doppler  Dr Dena Parker at bedside with ultrasound to assess vascular status        Manjit Estrella RN  02/04/19 5780

## 2019-02-04 NOTE — H&P
H&P Exam - General Surgery   Sasha Antony 70 y o  male MRN: [id number]  Unit/Bed#:  Encounter: [id number]    Assessment/Plan     Assessment:  69 yo M with acute bilateral lower extremity ischemia, >24 hours of ischemia time     Plan:  OR for emergent bilateral femoral endarterectomy     History of Present Illness   HPI:  Sasha Antony is a 70 y o  male who presents with bilateral lower extremity pain, and inability to ambulate since Saturday night  Patient has newly diagnosed A fib in December on Eliquis  Patient reports eating a lot of salt on Saturday night and subsequently feeling leg swelling and trouble moving assocaited with pain  Thought it would improve  Stayed non-ambulatory Sunday  Sister encouraged evaluation in ED today       Review of Systems   Constitutional: Negative for chills and fever  HENT: Negative for congestion  Respiratory: Negative for cough and shortness of breath  Cardiovascular: Positive for leg swelling  Negative for chest pain  Gastrointestinal: Negative for abdominal pain and constipation  Genitourinary: Negative for dysuria  Musculoskeletal: Positive for gait problem and myalgias  Negative for neck pain  Neurological: Negative for numbness  Psychiatric/Behavioral: Negative for confusion         Historical Information   Past Medical History:   Diagnosis Date    [facility]congestive [facility] failure) [facility] [address] [facility] )     [facility]     Hypertension      Past Surgical History:   Procedure Laterality Date    CAROTID ENDARTERECTOMY Left      Social History   History   Alcohol Use No     History   Drug Use No     History   Smoking Status    Former Smoker   Smokeless Tobacco    Never Used     Family History: non-contributory    Meds/Allergies   all medications and allergies reviewed  Allergies   Allergen Reactions    Acetaminophen      "after I take it it makes the pain worse"    Motrin [Ibuprofen] GI Intolerance       Objective   First Vitals:        Current Vitals:        No intake or output data in the 24 hours ending 02/04/19 1436    Invasive Devices     Peripheral Intravenous Line            Peripheral IV 02/04/19 Left Antecubital less than 1 day    Peripheral IV 02/04/19 Left Arm less than 1 day    Peripheral IV 02/04/19 Right Hand less than 1 day                Physical Exam   Constitutional: He is oriented to person, place, and time  He appears well-developed and well-nourished  HENT:   Head: Normocephalic  Eyes: Pupils are equal, round, and reactive to light  Neck: Normal range of motion  No tracheal deviation present  No thyromegaly present  Cardiovascular: Normal rate and regular rhythm  Pulmonary/Chest: Effort normal    Abdominal: Soft  He exhibits no distension  There is no tenderness  Musculoskeletal:   Bilateral lower extremities cool   Non palpable femorals   Lower extremities Cool/ dusky/ mottled bilaterally    Neurological: He is alert and oriented to person, place, and time  Lab Results:   I have personally reviewed pertinent lab results  , CBC:   Lab Results   Component Value Date    WBC 12 82 (H) 02/04/2019    HGB 17 7 (H) 02/04/2019    HCT 52 (H) 02/04/2019    MCV 95 02/04/2019     02/04/2019    MCH 32 0 02/04/2019    MCHC 33 7 02/04/2019    RDW 13 5 02/04/2019    MPV 11 6 02/04/2019    NRBC 0 02/04/2019   , CMP:   Lab Results   Component Value Date    SODIUM 135 (L) 02/04/2019    K 3 1 (L) 02/04/2019    CL 92 (L) 02/04/2019    CO2 32 02/04/2019    BUN 36 (H) 02/04/2019    CREATININE 1 78 (H) 02/04/2019    GLUCOSE 197 (H) 02/04/2019    CALCIUM 9 6 02/04/2019    EGFR 45 02/04/2019     Imaging: I have personally reviewed pertinent reports  EKG, Pathology, and Other Studies: I have personally reviewed pertinent reports  Code Status: Prior  Advance Directive and Living Will:      Power of :    POLST:      Counseling / Coordination of Care  Total floor / unit time spent today 30 minutes    Greater than 50% of total time was spent with the patient and / or family counseling and / or coordination of care  A description of the counseling / coordination of care: 30

## 2019-02-04 NOTE — EMTALA/ACUTE CARE TRANSFER
[facility] [id number]  Dept: [address] [place] [address] CONSENT    NAME Ronn Ramey                                         1940                              MRN [id number]    I have been informed of my rights regarding examination, treatment, and transfer   by Dr Andria Valentin DO    Benefits: Specialized equipment and/or services available at the receiving facility (Include comment)________________________    Risks: Potential for delay in receiving treatment, Potential deterioration of medical condition, Loss of IV, Increased discomfort during transfer      Consent for Transfer:  I acknowledge that my medical condition has been evaluated and explained to me by the emergency department physician or other qualified medical person and/or my attending physician, who has recommended that I be transferred to the service of  Accepting Physician: Tj Prieto at [facility] Name, [address] : [facility]  The above potential benefits of such transfer, the potential risks associated with such transfer, and the probable risks of not being transferred have been explained to me, and I fully understand them  The doctor has explained that, in my case, the benefits of transfer outweigh the risks  I agree to be transferred  I authorize the performance of emergency medical procedures and treatments upon me in both transit and upon arrival at the receiving facility  Additionally, I authorize the release of any and all medical records to the receiving facility and request they be transported with me, if possible  I understand that the safest mode of transportation during a medical emergency is an ambulance and that the Hospital advocates the use of this mode of transport   Risks of traveling to the receiving facility by car, including absence of medical control, life sustaining equipment, such as oxygen, and medical personnel has been explained to me and I fully understand them  (CARMEN CORRECT BOX BELOW)  [  ]  I consent to the stated transfer and to be transported by ambulance/helicopter  [  ]  I consent to the stated transfer, but refuse transportation by ambulance and accept full responsibility for my transportation by car  I understand the risks of non-ambulance transfers and I exonerate the Hospital and its staff from any deterioration in my condition that results from this refusal     X___________________________________________    DATE  19  TIME________  Signature of patient or legally responsible individual signing on patient behalf           RELATIONSHIP TO PATIENT_________________________          Provider Certification    NAME Radha Romero                                         1940                              MRN [id number]    A medical screening exam was performed on the above named patient  Based on the examination:    Condition Necessitating Transfer The encounter diagnosis was Ischemia of both lower extremities  Patient Condition: The patient has been stabilized such that within reasonable medical probability, no material deterioration of the patient condition or the condition of the unborn child(rama) is likely to result from the transfer    Reason for Transfer: Level of Care needed not available at this facility    Transfer Requirements: Facility [facility]   · Space available and qualified personnel available for treatment as acknowledged by    · Agreed to accept transfer and to provide appropriate medical treatment as acknowledged by       [address] [place] [address]  · Appropriate medical records of the examination and treatment of the patient are provided at the time of transfer   [address], [address] _______  · Transfer will be performed by qualified personnel from    and appropriate transfer equipment as required, including the use of necessary and appropriate life support measures      Provider Certification: I have examined the patient and explained the following risks and benefits of being transferred/refusing transfer to the patient/family:  General risk, such as traffic hazards, adverse weather conditions, rough terrain or turbulence, possible failure of equipment (including vehicle or aircraft), or consequences of actions of persons outside the control of the transport personnel      Based on these reasonable risks and benefits to the patient and/or the unborn child(rama), and based upon the information available at the time of the patients examination, I certify that the medical benefits reasonably to be expected from the provision of appropriate medical treatments at another medical facility outweigh the increasing risks, if any, to the individuals medical condition, and in the case of labor to the unborn child, from effecting the transfer      X____________________________________________ DATE 02/04/19        TIME_______      ORIGINAL - SEND TO MEDICAL RECORDS   COPY - SEND WITH PATIENT DURING TRANSFER

## 2019-02-04 NOTE — ED NOTES
Pt unable to tolerate lying in bed  Non slip socks placed on patient and has legs hanging over edge  Patient reports being able to safely stay there   Pt understands to ask for repositioning help and that he may need to lie down for provider exam      Isamar Mabry RN  02/04/19 0696

## 2019-02-04 NOTE — OR NURSING
Patient was an emergent transfer from [facility] to the [address] [place] [address] and was briefly in the [address]  He was emergently taken to OR 2 for surgery

## 2019-02-05 PROBLEM — I82.90 OCCLUSIVE THROMBUS: Status: ACTIVE | Noted: 2019-01-01

## 2019-02-05 NOTE — PROGRESS NOTES
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, and bilateral lower extremity ischemia, with total ischemia time of approximately 48 hours  POD1 s/p bilateral external iliac and common femoral endarterectomy, as well as bilateral four-compartment fasciotomies     Active problems     1  Bilateral lower extremity ischemia secondary to bilateral external iliac artery occlusion  2   atrial fibrillation  3  SEVEN on CKD3  4  Rhabdomyolysis  5  Insulin-dependent diabetes and hyperglycemia  6  S/p bilateral four-compartment lower extremity fasciotomies  7  ICU delirium on possible underlying dementia       Plan:      Neuro:  Currently listless, but easy to arouse  More confused than last night, however patient did not sleep well overnight  GCS is 14  Sensation and motor function decreased in the bilateral lower extremities, likely secondary to ischemia  -[facility] ICU  -delirium precautions  -continue Cymbalta  -start Seroquel HS  -continue Dilaudid PCA  Wean as tolerated  -q4 hour neurovascular checks  -gabapentin t i d      CV:   Atrial fibrillation - the patient endorses noncompliance with Eliquis  Has history of left atrial thrombus in the past  -continue heparin drip per vascular surgery  -continue daily Toprol  -monitor on telemetry    Bilateral lower extremity ischemia - 2/2 bilateral external iliac arterial thrombosis, POD1 s/p bilateral external iliac endarterectomy, and bilateral four-compartment lower extremity fasciotomy  Currently, absent signals of the left lower extremity  Faint monophasic waveform via Doppler of the right DP, and absent right PT    Capillary refill is sluggish bilaterally, however does appear slightly improved on the right side  VAC output 350 sanguinous overnight on the right, negligible on the left  -vascular surgery on board  Poor prognosis, patient may eventually require LLE amputation, possible RLE amputation  -continue to monitor VAC output  -q2 hour neurovascular checks  -continue arterial line for frequent blood draws, blood pressure monitoring     Pulm:   Respiratory insufficiency - currently on 2 L nasal cannula  -respiratory protocol, aggressive pulmonary toilet  -goal oxygen saturation >92%     GI:   -advance diet  -Senokot  -check FOBT due to down trending hemoglobin     :   Rhabdomyolysis - likely secondary to extensive muscle breakdown in the setting of bilateral lower extremity ischemia, and subsequent revascularization  CK trending upward, most recently [id number]  Acute kidney injury on CKD 3 - secondary to above  Baseline creatinine appears to be 1 5-1 8, however acute increased from 1 54-1 85 since OR yesterday  Clinically, the patient appears volume depleted to euvolemic, does not display any signs of volume overload currently  -check urinalysis  -q6 hour CK, BMP  -200/hour isolyte  Will also given additional 500 cc bolus  -if UOP continues to not respond to IV fluids, will consider adding Lasix and 1:1 volume replacement     F/E/N:   -isolyte 200/hour  -will defer potassium repletion at this time due to acute kidney injury, possible revascularization injury, and decreased urine output  -consistent carb diet     ID:  No acute issues     Heme:   -hemoglobin continues to down trend  Will monitor closely  Transfuse for HB <7  -FOBT  -heparin GTT  Monitor PTT, and titrate as indicated  -SCDs deferred 2/2 BL LE pain     Endo: blood sugars uncontrolled overnight    Unclear basal insulin requirements, likely increased secondary to critical illness  -will start insulin GTT  -Q2 hour fsbs  -goal blood sugar 140-180        Msk/Skin:   -care of bilateral lower extremity ischemia as above  -frequent turning, repositioning, pressure point offloading  -routine skin care        Disposition:  ICU  Code Status: Level 1 - Full Code    Counseling / Coordination of Care    ______________________________________________________________________    Chief Complaint:  My legs are burning    24 Hour Events:  Urine output decreased overnight  Mental status deteriorated overnight  Required multiple IV fluid boluses  The patient also complained of increased leg pain, and was switched to Dilaudid PCA  Pain was better controlled on this regimen  Patient offers no current complaints, however is more listless this morning       ______________________________________________________________________    Physical Exam:       Physical Exam   Constitutional:   Listless, but easily arousable to voice  GCS is 14  Mental status is slightly altered, slightly confused   HENT:   Head: Normocephalic and atraumatic  Mouth/Throat: No oropharyngeal exudate  Eyes: Pupils are equal, round, and reactive to light  No scleral icterus  Neck: Normal range of motion  No JVD present  No tracheal deviation present  Cardiovascular: Normal rate and normal heart sounds  No murmur heard  Pulmonary/Chest: Effort normal  No respiratory distress  He has no wheezes  Abdominal: Soft  He exhibits no distension  There is no tenderness  Musculoskeletal:   Right lower extremity wound with moderate serosanguineous discharge around dressing, sanguinous drainage in VAC  Monophasic signal noted on the right DP  Right PT is absent  Foot is cold to palpation, with sluggish capillary refill    Left lower extremity wound clean, dry, intact  Sanguinous drainage noted in VAC  No dopplerable signals noted  Left lower extremity is diffusely cold to palpation, with exquisitely slow capillary refill  Neurological:   Alert, oriented to self, and time, but not to place  GCS is 14   Grossly nonfocal neurologic exam   Decreased strength of the bilateral lower extremities, as well as decreased sensation of the bilateral lower extremities  Skin: Skin is warm and dry  No rash noted  ______________________________________________________________________  Vitals:    19 0407 19 0415 19 0500 19 0515   BP:  138/54  124/50   BP Location:       Pulse:  88 86 88   Resp:  17 12 21   Temp:       TempSrc:       SpO2: 98% 99% 98% 98%   Weight:       Height:         Arterial Line BP: 124/50  Arterial Line MAP (mmHg): 68 mmHg     Temperature:   Temp (24hrs), Av 3 °F (36 8 °C), Min:97 6 °F (36 4 °C), Max:99 7 °F (37 6 °C)    Current Temperature: 98 °F (36 7 °C)  Weights:   IBW: 73 kg    Body mass index is 37 96 kg/m²  Weight (last 2 days)     Date/Time   Weight    19 1925  120 (264 55)            Hemodynamic Monitoring:  N/A     Non-Invasive/Invasive Ventilation Settings:  Respiratory    Lab Data (Last 4 hours)    None         O2/Vent Data (Last 4 hours)    None              Lab Results   Component Value Date    PHART 7 424 2019    RVA8OCF 37 4 2019    PO2ART 82 4 2019    CHV3GRV 23 9 2019    BEART -0 2 2019    SOURCE Line, Arterial 2019     SpO2: SpO2: 98 %  Intake and Outputs:  I/O        0701 -  0700  07 -  0700    I V  (mL/kg)  3893 6 (32 4)    IV Piggyback  350    Total Intake(mL/kg)  4243 6 (35 4)    Urine (mL/kg/hr)  1210    Drains  200    Blood  400    Total Output   1810    Net   +2433 6              UOP: 40/hour   Nutrition:        Diet Orders            Start     Ordered    19  Diet Shahbaz/CHO Controlled; Consistent Carbohydrate Diet Level 1 (4 carb servings/60 grams CHO/meal)  Diet effective now     Question Answer Comment   Diet Type Shahbaz/CHO Controlled    Shahbaz/CHO Controlled Consistent Carbohydrate Diet Level 1 (4 carb servings/60 grams CHO/meal)    RD to adjust diet per protocol?  No        19          Labs: Results from last 7 days  Lab Units 02/04/19  1814 02/04/19  1505 02/04/19  1252 02/04/19  1245   WBC Thousand/uL 13 52*  --   --  12 82*   HEMOGLOBIN g/dL 13 8  --   --  16 4   I STAT HEMOGLOBIN g/dl  --  16 0 17 7*  --    HEMATOCRIT % 41 7  --   --  48 6   HEMATOCRIT, ISTAT %  --  47 52*  --    PLATELETS Thousands/uL 154  --   --  177   NEUTROS PCT %  --   --   --  71   MONOS PCT %  --   --   --  9       Results from last 7 days  Lab Units 02/04/19  2358 02/04/19  1814 02/04/19  1505 02/04/19  1252  02/04/19  1245   POTASSIUM mmol/L 3 7 3 2*  --   --   --  3 1*   CHLORIDE mmol/L 97* 96*  --   --   --  92*   CO2 mmol/L 27 25  --   --   --  33*   CO2, I-STAT mmol/L  --   --  38* 32  < >  --    BUN mg/dL 35* 31*  --   --   --  36*   CREATININE mg/dL 1 85* 1 54*  --   --   --  1 78*   CALCIUM mg/dL 8 1* 8 5  --   --   --  9 6   GLUCOSE, ISTAT mg/dl  --   --  183* 197*  --   --    < > = values in this interval not displayed  Results from last 7 days  Lab Units 02/04/19  2358   MAGNESIUM mg/dL 2 3     No results found for: PHOS     Results from last 7 days  Lab Units 02/04/19  2358 02/04/19  1922 02/04/19  1814 02/04/19  1245   INR   --   --  1 19* 1 08   PTT seconds 160* >210* >210* 28       0  Lab Value Date/Time   TROPONINI 0 07 (H) 12/19/2017 1339   TROPONINI 0 04 (H) 01/30/2017 0056       Results from last 7 days  Lab Units 02/04/19  2358   LACTIC ACID mmol/L 3 2*     ABG:  Lab Results   Component Value Date    PHART 7 424 02/04/2019    FUL5JTO 37 4 02/04/2019    PO2ART 82 4 02/04/2019    DGH0WVL 23 9 02/04/2019    BEART -0 2 02/04/2019    SOURCE Line, Arterial 02/04/2019     Imaging:  I have personally reviewed pertinent reports  EKG:   Micro:  Lab Results   Component Value Date    URINECX No Growth <1000 cfu/mL 01/29/2017     Allergies:    Allergies   Allergen Reactions    Acetaminophen      "after I take it it makes the pain worse"    Motrin [Ibuprofen] GI Intolerance     Medications:   Scheduled Meds:  Current Facility-Administered Medications:  atorvastatin 40 mg Oral Daily With [email or web address]    docusate sodium 100 mg Oral BID PRN Brissa Dumont    DULoxetine 30 mg Oral Daily Brissa Dumont    gabapentin 100 mg Oral TID Brissa Dumont    heparin (porcine) 3-30 Units/kg/hr (Order-Specific) Intravenous Titrated Brissa Dumont Last Rate: 12 Units/kg/hr (02/05/19 0546)   heparin (porcine) 4,600 Units Intravenous PRN Brissa Dumont    heparin (porcine) 9,200 Units Intravenous PRN Brissa Dumont    HYDROmorphone  Intravenous Continuous Donis Cuenca MD    insulin regular (HumuLIN R,NovoLIN R) infusion 0 3-21 Units/hr Intravenous Titrated Donis Cuenca MD Last Rate: 5 Units/hr (02/05/19 7227)   Sherol Cava ON 2/6/2019] metoprolol succinate 100 mg Oral Daily Donis Cuenca MD    multi-electrolyte 200 mL/hr Intravenous Continuous Donis Cuenca MD Last Rate: 200 mL/hr (02/05/19 0546)   multi-electrolyte 500 mL Intravenous Once Donis Cuenca MD    naloxone 0 04 mg Intravenous Q1MIN PRN Donis Cuenca MD    ondansetron 4 mg Intravenous Q4H PRN Brissa Dumont    senna-docusate sodium 2 tablet Oral Daily Donis Cuenca MD      Continuous Infusions:  heparin (porcine) 3-30 Units/kg/hr (Order-Specific) Last Rate: 12 Units/kg/hr (02/05/19 0546)   HYDROmorphone     insulin regular (HumuLIN R,NovoLIN R) infusion 0 3-21 Units/hr Last Rate: 5 Units/hr (02/05/19 7001)   multi-electrolyte 200 mL/hr Last Rate: 200 mL/hr (02/05/19 0546)     PRN Meds:    docusate sodium 100 mg BID PRN   heparin (porcine) 4,600 Units PRN   heparin (porcine) 9,200 Units PRN   naloxone 0 04 mg Q1MIN PRN   ondansetron 4 mg Q4H PRN     VTE Pharmacologic Prophylaxis: Sequential compression device (Venodyne)  and Heparin  VTE Mechanical Prophylaxis: reason for no mechanical VTE prophylaxis Bilateral ischemic lower extremities, significant tenderness to palpation  Invasive lines and devices:   Invasive Devices     Peripheral Intravenous Line            Peripheral IV 02/04/19 Left Arm less than 1 day    Peripheral IV 02/04/19 Left Hand less than 1 day    Peripheral IV 02/04/19 Right Antecubital less than 1 day    Peripheral IV 02/04/19 Right Hand less than 1 day          Arterial Line            Arterial Line 02/04/19 Right Radial less than 1 day          Drain            Negative Pressure Wound Therapy (V A C ) Leg Left; Inner; Outer; Lower less than 1 day    Negative Pressure Wound Therapy (V A C ) Leg Right; Inner; Outer; Lower less than 1 day    Urethral Catheter Latex 16 Fr  less than 1 day                     Portions of the record may have been created with voice recognition software  Occasional wrong word or "sound a like" substitutions may have occurred due to the inherent limitations of voice recognition software  Read the chart carefully and recognize, using context, where substitutions have occurred      Maximiliano Parikh MD

## 2019-02-05 NOTE — OCCUPATIONAL THERAPY NOTE
OT CANCEL NOTE    OT orders received  Chart reviewed  Pt is currently not appropriate to engage in skilled OT services at this time 2* to acute blood clots  Will hold initial OT evaluation  Will continue to follow pt on caseload and see pt when medically stable and as clinically appropriate      Elizabeth CONRAD, OTR/L

## 2019-02-05 NOTE — RESPIRATORY THERAPY NOTE
RT Protocol Note  Keeley Horn 70 y o  male MRN: [id number]  Unit/Bed#:  -01 Encounter: [id number]    Assessment    Principal Problem:    Ischemia of both lower extremities      Home Pulmonary Medications:  None       Past Medical History:   Diagnosis Date    CHF [facility]congestive [facility] failure) ([facility] [address] [facility]     Diabetes mellitus ([facility] )     Hypertension      Social History     Social History    Marital status: Single     Spouse name: N/A    Number of children: N/A    Years of education: N/A     Social History Main Topics    Smoking status: Former Smoker    Smokeless tobacco: Never Used    Alcohol use No    Drug use: No    Sexual activity: No     Other Topics Concern    None     Social History Narrative    Pt unable to provide surgical hx at this time       Subjective         Objective    Physical Exam:   Assessment Type: Assess only  General Appearance: Awake, Alert  Respiratory Pattern: Normal  Chest Assessment: Chest expansion symmetrical  Bilateral Breath Sounds: Clear  O2 Device: 2LNC    Vitals:  Blood pressure 94/59, pulse 70, temperature 98 3 °F (36 8 °C), temperature source Oral, resp  rate 15, height 5' 10" (1 778 m), weight 120 kg (264 lb 8 8 oz), SpO2 99 %  Imaging and other studies: I have personally reviewed pertinent reports  O2 Device: 2LNC     Plan    Respiratory Plan: No distress/Pulmonary history, Discontinue Protocol        Resp Comments: (P) Respiratory protocol initiated at this time  Pt admitted for bilateral lower extremity ischemia  Pt states he does not have any pulmonary history  Pt states he does not take any respiratory meds at home  Pt does not need any respiratory intervention at this time  Will d/c protocol

## 2019-02-05 NOTE — PROGRESS NOTES
Vascular    Progress Note - Joel Kruse 1940, 70 y o  male MRN: [id number]    Unit/Bed#:  -01 Encounter: [id number]    Primary Care Provider: Echo Edmond   Date and time admitted to hospital: 2/4/2019  2:39 PM        * Ischemia of both lower extremities   Assessment & Plan    Cloud IIb acute ischemia in the setting of left atrial thrombus and patient stopping to take anticoagulation  Patient presenting with several days of decreased/no motor function and no distal signals    OR 2/4 b/l femoral embolectomy w/ b/l lower limb fasciotomies  Cont heparin gtt  Cont VAC to lower legs to low continuous 125  High likelihood that the patient will need at least an amputation to left leg - will continue to follow  Right leg may prove salvageable  Type 2 diabetes mellitus with stage 4 chronic kidney disease ([facility] [address] )   Assessment & Plan    Care per ICU  On 10U w/ meals and meal time checks  Rec switch to insulin gtt     Atrial fibrillation with rapid ventricular response (HCC)   Assessment & Plan    Cont lopressor 100mg qd  Currently rate controlled           Subjective/Objective   Subjective:   2x 500ml bolus as urine output has fallen off  Still no dopplerable signals to b/l LE  Occasional possible doppl R AT  No motor  Objective:    Blood pressure 124/50, pulse 88, temperature 98 °F (36 7 °C), temperature source Oral, resp  rate 21, height 5' 10" (1 778 m), weight 120 kg (264 lb 8 8 oz), SpO2 98 %  ,Body mass index is 37 96 kg/m²        Intake/Output Summary (Last 24 hours) at 02/05/19 0507  Last data filed at 02/05/19 0501   Gross per 24 hour   Intake          4093 59 ml   Output             1810 ml   Net          2283 59 ml       Invasive Devices     Peripheral Intravenous Line            Peripheral IV 02/04/19 Left Arm less than 1 day    Peripheral IV 02/04/19 Left Hand less than 1 day    Peripheral IV 02/04/19 Right Antecubital less than 1 day    Peripheral IV 02/04/19 Right Hand less than 1 day          Arterial Line            Arterial Line 02/04/19 Right Radial less than 1 day          Drain            Negative Pressure Wound Therapy (V A C ) Leg Left; Inner; Outer; Lower less than 1 day    Negative Pressure Wound Therapy (V A C ) Leg Right; Inner; Outer; Lower less than 1 day    Urethral Catheter Latex 16 Fr  less than 1 day                Physical Exam:     Gen: NAD, AAOx3 but behaving confused  CV: RRR  Pulm: no resp distress  Abd: Soft, non-distended, non-tender  Incisions c/d/i  VAC in place to b/l LE w/ sang output on right  Will need changing  No doppl signals b/l  No motor or sensation      Lab, Imaging and other studies:  I have personally reviewed pertinent lab results    , CBC:   Lab Results   Component Value Date    WBC 13 52 (H) 02/04/2019    HGB 13 8 02/04/2019    HCT 41 7 02/04/2019    MCV 96 02/04/2019     02/04/2019    MCH 31 7 02/04/2019    MCHC 33 1 02/04/2019    RDW 13 5 02/04/2019    MPV 11 1 02/04/2019    NRBC 0 02/04/2019   , CMP:   Lab Results   Component Value Date    SODIUM 134 (L) 02/04/2019    K 3 7 02/04/2019    CL 97 (L) 02/04/2019    CO2 27 02/04/2019    CO2 38 (H) 02/04/2019    BUN 35 (H) 02/04/2019    CREATININE 1 85 (H) 02/04/2019    GLUCOSE 183 (H) 02/04/2019    CALCIUM 8 1 (L) 02/04/2019    EGFR 35 02/04/2019    EGFR 45 02/04/2019     VTE Pharmacologic Prophylaxis: Heparin  VTE Mechanical Prophylaxis: sequential compression device

## 2019-02-05 NOTE — CONSULTS
Critical Care Accept Note   Joel Kruse 70 y o  male MRN: [id number]  Unit/Bed#: 3150 [facility] -01 Encounter: [id number]    Reason for Admission / Chief Complaint:  Bilateral lower extremity ischemia     History of Present Illness:  Joel Kruse is a 70 y o  male with a history of congestive heart failure with an ejection fraction 45%,atrial fibrillation for which she has been noncompliant with anticoagulation (Eliquis), type 2 diabetes,, who initially presented to [facility] after being unable to ambulate since 2/2 due to progressive numbness and tingling of the lower extremities, as well as pain which caused trouble with movement  Apparently, this had happened to the patient in the past, and it had resolved in the past   The patient also reports increased leg swelling, and also a large p o  Intake of salt over the weekend, which is what the patient attributed this is being due to  Patient was found to have pulseless lower extremities bilateral, associated with coolness, decreased motor function, and decreased sensation  A CTA was obtained, which revealed thrombus initiating in the external iliac arteries bilaterally  The patient was transferred to [facility], for evaluation by vascular surgery, and was taken emergently to the operating room for bilateral iliac and femoral endarterectomy  A large amount of thrombus was removed from both common femoral arteries, with reports of excellent backflow following embolectomy  Postoperatively, the patient was again found to be pulseless of the bilateral extremities, with absent DP and PT pulses  He was extubated without difficulty, woken from anesthesia, and return to the ICU after having an appropriate mental status  History obtained from chart review      Past Medical History:  Past Medical History:   Diagnosis Date    CHF (congestive heart failure) ([facility] [address]     [facility]     Hypertension        Past Surgical History:  Past Surgical History:   Procedure Laterality Date    CAROTID ENDARTERECTOMY Left        Past Family History:  Family History   Problem Relation Age of Onset    Cancer Mother     Cancer Father        Social History:  History   Smoking Status    Former Smoker   Smokeless Tobacco    Never Used     History   Alcohol Use No     History   Drug Use No     Marital Status: Single  Exercise History:  Minimal    Medications:  Current Facility-Administered Medications   Medication Dose Route Frequency    atorvastatin (LIPITOR) tablet 40 mg  40 mg Oral Daily With Dinner    docusate sodium (COLACE) capsule 100 mg  100 mg Oral BID PRN    [START ON 2/5/2019] DULoxetine (CYMBALTA) delayed release capsule 30 mg  30 mg Oral Daily    gabapentin (NEURONTIN) capsule 100 mg  100 mg Oral TID    heparin (porcine) 25,000 units in 250 mL infusion (premix)  3-30 Units/kg/hr (Order-Specific) Intravenous Titrated    heparin (porcine) injection 4,600 Units  4,600 Units Intravenous PRN    heparin (porcine) injection 9,200 Units  9,200 Units Intravenous PRN    HYDROmorphone (DILAUDID) injection 0 5 mg  0 5 mg Intravenous Q4H PRN    insulin detemir (LEVEMIR) subcutaneous injection 50 Units  50 Units Subcutaneous HS    [START ON 2/5/2019] insulin lispro (HumaLOG) 100 units/mL subcutaneous injection 1-5 Units  1-5 Units Subcutaneous TID AC    [START ON 2/5/2019] insulin lispro (HumaLOG) 100 units/mL subcutaneous injection 10 Units  10 Units Subcutaneous Daily With Breakfast    [START ON 2/5/2019] insulin lispro (HumaLOG) 100 units/mL subcutaneous injection 10 Units  10 Units Subcutaneous Daily With Lunch    insulin lispro (HumaLOG) 100 units/mL subcutaneous injection 10 Units  10 Units Subcutaneous Daily With Dinner    [START ON 2/5/2019] metoprolol succinate (TOPROL-XL) 24 hr tablet 100 mg  100 mg Oral Daily    ondansetron (ZOFRAN) injection 4 mg  4 mg Intravenous Q4H PRN    oxyCODONE-acetaminophen (PERCOCET) 5-325 mg per tablet 1 tablet  1 tablet Oral Q4H PRN    oxyCODONE-acetaminophen (PERCOCET) 5-325 mg per tablet 2 tablet  2 tablet Oral Q4H PRN    sodium chloride 0 9 % infusion  75 mL/hr Intravenous Continuous     Home medications:  Prior to Admission medications    Medication Sig Start Date End Date Taking? Authorizing Provider   apixaban (ELIQUIS) 5 mg Take 1 tablet by mouth 2 (two) times a day 12/22/17   Beverly Mckenna MD   DULoxetine (CYMBALTA) 30 mg delayed release capsule Take 1 capsule by mouth daily 12/22/17   Beverly Mckenna MD   gabapentin (NEURONTIN) 300 mg capsule Take 100 mg by mouth 3 (three) times a day    Historical Provider, MD   insulin aspart (NovoLOG) 100 units/mL injection Inject under the skin 3 (three) times a day before meals    Historical Provider, MD   insulin detemir (LEVEMIR) 100 units/mL subcutaneous injection Inject 50 Units under the skin daily at bedtime    Historical Provider, MD   levothyroxine 50 mcg tablet Take 1 tablet by mouth daily 12/22/17   Beverly Mckenna MD   lisinopril (ZESTRIL) 5 mg tablet Take 1 tablet by mouth daily 12/22/17   Beverly Mckenna MD   metoprolol succinate (TOPROL-XL) 100 mg 24 hr tablet Take 1 tablet by mouth daily 12/22/17   Beverly Mckenna MD   rosuvastatin (CRESTOR) 40 MG tablet Take 40 mg by mouth daily    Historical Provider, MD     Allergies: Allergies   Allergen Reactions    Acetaminophen      "after I take it it makes the pain worse"    Motrin [Ibuprofen] GI Intolerance       ROS:   Review of Systems   Constitutional: Negative for chills and fever  HENT: Negative for congestion and sinus pain  Eyes: Negative for photophobia and visual disturbance  Respiratory: Negative for cough and shortness of breath  Cardiovascular: Negative for chest pain and palpitations  Gastrointestinal: Negative for abdominal pain, diarrhea, nausea and vomiting  Genitourinary: Negative for dysuria and hematuria  Musculoskeletal: Negative for neck pain and neck stiffness  Skin: Negative for pallor and rash  Neurological: Positive for weakness (Bilateral lower extremity) and numbness ( bilateral lower extremity)  Negative for light-headedness and headaches  Vitals:  Vitals:    19 1815 19 1830 19 1845 19 1925   BP: 113/62 117/58 (!) 95/47 94/59   BP Location:    Left arm   Pulse: 70 68 68 68   Resp: 13 13 13 (!) 6   Temp:  97 6 °F (36 4 °C)  98 3 °F (36 8 °C)   TempSrc:    Oral   SpO2: 99% 99% 98% 97%   Weight:    120 kg (264 lb 8 8 oz)   Height:    5' 10" (1 778 m)     Temperature:   Temp (24hrs), Av 4 °F (36 9 °C), Min:97 6 °F (36 4 °C), Max:99 7 °F (37 6 °C)    Current Temperature: 98 3 °F (36 8 °C)    Weights:   IBW: 73 kg  Body mass index is 37 96 kg/m²  Hemodynamic Monitoring:  N/A arterial line     Non-Invasive/Invasive Ventilation Settings:  Respiratory    Lab Data (Last 4 hours)    None         O2/Vent Data (Last 4 hours)    None              No results found for: PHART, PWW7XMX, PO2ART, CLL0LUV, X5RFZGOR, BEART, SOURCE  SpO2: SpO2: 97 %     Physical Exam:  Physical Exam   Constitutional: He is oriented to person, place, and time  Listless, but easily arousable to voice  Mental status grossly appropriate  Pale appearing, chronically ill appearing  Nontoxic in appearance  HENT:   Head: Normocephalic and atraumatic  Eyes: Pupils are equal, round, and reactive to light  No scleral icterus  Neck: Normal range of motion  Neck supple  No JVD present  Cardiovascular: Normal rate  No murmur heard  Pulmonary/Chest: Effort normal  No respiratory distress  He has no wheezes  He has no rales  Abdominal: Soft  He exhibits no distension  There is no tenderness  Musculoskeletal:   Bilateral lower extremities have wound VAC in place, clean dry and intact  There is no significant tenderness  Compartments are soft  There is nearly absent sensation of the lower extremities bilaterally    There is a faint monophasic waveform on Doppler of the right DP, however right PT, left PT/DP pulse have no signal   Capillary refill is sluggish bilaterally, slightly brisker on the right lower extremity  Bilateral lower extremities are cold to palpation  Neurological: He is alert and oriented to person, place, and time  No cranial nerve deficit  Skin: Skin is warm and dry  No rash noted  There is pallor  Labs:    Results from last 7 days  Lab Units 02/04/19  1814 02/04/19  1505 02/04/19  1252 02/04/19  1245   WBC Thousand/uL 13 52*  --   --  12 82*   HEMOGLOBIN g/dL 13 8  --   --  16 4   I STAT HEMOGLOBIN g/dl  --  16 0 17 7*  --    HEMATOCRIT % 41 7  --   --  48 6   HEMATOCRIT, ISTAT %  --  47 52*  --    PLATELETS Thousands/uL 154  --   --  177   NEUTROS PCT %  --   --   --  71   MONOS PCT %  --   --   --  9      Results from last 7 days  Lab Units 02/04/19 1814 02/04/19  1505 02/04/19  1252 02/04/19  1245   POTASSIUM mmol/L 3 2*  --   --  3 1*   CHLORIDE mmol/L 96*  --   --  92*   CO2 mmol/L 25  --   --  33*   CO2, I-STAT mmol/L  --  38* 32  --    BUN mg/dL 31*  --   --  36*   CREATININE mg/dL 1 54*  --   --  1 78*   CALCIUM mg/dL 8 5  --   --  9 6   GLUCOSE, ISTAT mg/dl  --  183* 197*  --                 Results from last 7 days  Lab Units 02/04/19  1814 02/04/19  1245   INR  1 19* 1 08   PTT seconds >210* 28           0  Lab Value Date/Time   TROPONINI 0 07 (H) 12/19/2017 1339   TROPONINI 0 04 (H) 01/30/2017 0056       Imaging:  I have personally reviewed pertinent reports  EKG: This was personally reviewed by myself  Micro:  Lab Results   Component Value Date    URINECX No Growth <1000 cfu/mL 01/29/2017       ______________________________________________________________________    Assessment:   This is a 33-year-old male with history of atrial fibrillation, for which she endorses noncompliance with this Eliquis, found to have bilateral occlusions of the external iliac arteries, and bilateral lower extremity ischemia, with total ischemia time of approximately 48 hours  POD0 s/p bilateral external iliac endarterectomy, as well as prophylactic fasciotomies    Active problems    1  Bilateral lower extremity ischemia secondary to bilateral external artery occlusion  2  Atrial fibrillation  3  CKD 3  4  Insulin-dependent diabetes    Plan:     Neuro:  Currently awake and alert  GCS is 15  Sensation and motor function decreased in the bilateral lower extremities, likely secondary to ischemia  -[facility] ICU  -delirium precautions  -patient states allergy to Tylenol, refuses this medication  -gabapentin t i d   -oxycodone p r n  For pain  Dilaudid for breakthrough pain    CV:   Atrial fibrillation - the patient versus noncompliance with Eliquis  Has apparent history of left atrial thrombus the past  -continue heparin drip  -daily Toprol  -monitor on telemetry  -patient with borderline hypotension postoperatively  Will discontinue a line once persistently normotensive    Pulm:   No acute issues  -respiratory protocol, aggressive pulmonary toilet    GI:   -advance diet  -start scheduled Senokot    :   -monitor UOP  -a m  Creatinine    F/E/N:   -currently on normal saline 75/hour  Discontinue once tolerating diet  -replete electrolytes as needed  -diet as above    ID:  No acute issues    Heme:   -heparin GTT  Monitor PTT, and titrate as indicated  -SCDs deferred 2/2 BL LE pain    Endo:   -sliding scale insulin      Msk/Skin:   Bilateral lower extremity ischemia - 2/2 bilateral external iliac arterial thrombosis, POD0 s/p bilateral external iliac endarterectomy, and bilateral four-compartment lower extremity fasciotomy  Currently, absent signals of the left lower extremity  Faint monophasic waveform via Doppler of the right DP, and absent PT  Capillary refill is sluggish bilaterally, however does appear slightly improved on the right side   -vascular surgery on board    Poor prognosis, patient may eventually require BL amputations  -monitor VAC output  Serosanguinous discharge R>L currently  -q 2 hours neurovascular checks  -recheck labs at midnight out of concern for revascularization injury  -frequent turning, repositioning, pressure point offloading  -routine skin care      Disposition:  ICU    Counseling / Coordination of Care    ______________________________________________________________________    VTE Pharmacologic Prophylaxis: Heparin  VTE Mechanical Prophylaxis:deferred 2/2 ischemic pain BL    Invasive lines and devices: Invasive Devices     Peripheral Intravenous Line            Peripheral IV 02/04/19 Left Arm less than 1 day    Peripheral IV 02/04/19 Left Hand less than 1 day    Peripheral IV 02/04/19 Right Antecubital less than 1 day    Peripheral IV 02/04/19 Right Hand less than 1 day          Arterial Line            Arterial Line 02/04/19 Right Radial less than 1 day          Drain            Negative Pressure Wound Therapy (V A C ) Leg Left; Inner; Outer; Lower less than 1 day    Negative Pressure Wound Therapy (V A C ) Leg Right; Inner; Outer; Lower less than 1 day    Urethral Catheter Latex 16 Fr  less than 1 day                Code Status: Level 1 - Full Code  POA:    POLST:      Given critical illness, patient length of stay will require greater than two midnights  Portions of the record may have been created with voice recognition software  Occasional wrong word or "sound a like" substitutions may have occurred due to the inherent limitations of voice recognition software  Read the chart carefully and recognize, using context, where substitutions have occurred        Ambrocio Garvin MD

## 2019-02-05 NOTE — PROGRESS NOTES
Pt arrived from PACU to  at this time  BPs are soft; MAP >60  Dr Lemon Many notified and is bedside  R pedal pulse is doppler  L pedal pulse & post tib are absent; popliteal pulse is present as a +1  Both feet are cool to touch; L foot is cyanotic  General and critical care surgery are aware  Will continue to monitor pt at this time

## 2019-02-05 NOTE — PROGRESS NOTES
02/05/19 [address] Involvement Patient active with [employer]   Spiritual Beliefs/Perceptions   Concept of God Accepting   Support Systems Family members   Stress Factors   Patient Stress Factors Health changes   Family Stress Factors Health changes   Coping Responses   Family Coping Open/discussion; Other (Comment)  (Frustration)   Plan of Care   Comments Cultivated a relationship of care and support with patient's siblings  Faciliated story telling of patient's health condition  Explored patients network of support including family and sofy community     Assessment Completed by: Unit visit

## 2019-02-05 NOTE — PROGRESS NOTES
Post-Op Check    Assessment: 70 y o  s/p b/l femoral thromboembolectomy and b/l 4 compartment fasciotomies    Plan:  Heparin gtt w/ plan   F/u glucose - may need insulin infusion for control  F/u labs  F/u I/O   Waldo@[employer]  Continue VAC to 125    Continue to follow extremities - still anticipate a good chance that he will require amputations  Subjective:  No sensation to toes       Vitals:    02/04/19 2054   BP:    Pulse:    Resp:    Temp:    SpO2: 99%       PE:  Gen: NAD, AAOx3  CV: Normal rate  Pulm: no resp distress  Abd: Soft, non-distended, non-tender  Non-dopplerable DP/PT  Only slight movement of left toes  Left foot cold and mottled  Right foot w/ improved cap refill - warm    Marlyce Dilling PGY3  General Surgery

## 2019-02-05 NOTE — ED PROVIDER NOTES
History  Chief Complaint   Patient presents with    Leg Swelling     Pt presents to ER brought in by [facility] for worsening leg pain and swelling x2 days  Pain and swelling is below knees on both sides, minimal depth of pitting but lasts ~10 seconds  Legs cool to touch  Patient ambulates at home with walker for EMS  HPI      24-year-old male brought in by ambulance for [facility]" patient reports for the past day he has been unable to move his feet because there are so swollen  He has been unable to walk  Although he was able to use a walker to get to the EMS truck  He reports mild pain  Constant aching  Radiates from his feet to his knees  Denies any other associated symptoms  He states that he thinks that his problems started after a big blow out hoagie with a lot of salt on it that started his swelling  No other associated symptoms or modifying factors    On exam the patient's bilateral lower extremities appear mottled from the mid shin down  He is unable to move or feel both of his lower extremities below his knee  He has greater than 20 sec cap refill  Assessment plan:  Bilateral lower extremity pain who  Concern for bilateral femoral artery occlusion causing ischemia  Bedside ultrasound performed by myself shows no color flow in both common femoral arteries although there does appear to be patency of the iliacs although difficult to ascertain due to body habitus on ultrasound  Unfortunately patient has only one kidney according to him, CTA at this point would be risky but will discuss with vascular surgery for emergent transfer, may just end up doing CTA if time permits to better ascertain clot burden    Prior to Admission Medications   Prescriptions Last Dose Informant Patient Reported? Taking?    DULoxetine (CYMBALTA) 30 mg delayed release capsule   No No   Sig: Take 1 capsule by mouth daily   apixaban (ELIQUIS) 5 mg   No No   Sig: Take 1 tablet by mouth 2 (two) times a day gabapentin (NEURONTIN) 300 mg capsule   Yes No   Sig: Take 100 mg by mouth 3 (three) times a day   insulin aspart (NovoLOG) 100 units/mL injection   Yes No   Sig: Inject under the skin 3 (three) times a day before meals   insulin detemir (LEVEMIR) 100 units/mL subcutaneous injection   Yes No   Sig: Inject 50 Units under the skin daily at bedtime   levothyroxine 50 mcg tablet   No No   Sig: Take 1 tablet by mouth daily   lisinopril (ZESTRIL) 5 mg tablet   No No   Sig: Take 1 tablet by mouth daily   metoprolol succinate (TOPROL-XL) 100 mg 24 hr tablet   No No   Sig: Take 1 tablet by mouth daily   rosuvastatin (CRESTOR) 40 MG tablet   Yes No   Sig: Take 40 mg by mouth daily      Facility-Administered Medications: None       Past Medical History:   Diagnosis Date    CHF [facility]congestive heart failure) ([facility] )     Diabetes mellitus ([facility] )     Hypertension        Past Surgical History:   Procedure Laterality Date    CAROTID ENDARTERECTOMY Left        Family History   Problem Relation Age of Onset    Cancer Mother     Cancer Father      I have reviewed and agree with the history as documented  Social History   Substance Use Topics    Smoking status: Former Smoker    Smokeless tobacco: Never Used    Alcohol use No        Review of Systems    Physical Exam  Physical Exam   Constitutional: He is oriented to person, place, and time  He appears well-developed  HENT:   Head: Normocephalic and atraumatic  Right Ear: External ear normal    Left Ear: External ear normal    Mouth/Throat: Oropharynx is clear and moist    Eyes: Pupils are equal, round, and reactive to light  Conjunctivae and EOM are normal    Neck: Normal range of motion  Neck supple  No JVD present  No thyromegaly present  Cardiovascular: Normal rate, regular rhythm and normal heart sounds  Exam reveals no gallop and no friction rub  No murmur heard  Pulses:       Carotid pulses are 2+ on the right side, and 2+ on the left side         Radial pulses are 2+ on the right side, and 2+ on the left side  Femoral pulses are 0 on the right side, and 0 on the left side  Popliteal pulses are 0 on the right side, and 0 on the left side  Dorsalis pedis pulses are 0 on the right side, and 0 on the left side  Posterior tibial pulses are 0 on the right side, and 0 on the left side  Left carotid endarterectomy scar     Pulmonary/Chest: Effort normal and breath sounds normal  No respiratory distress  He has no wheezes  He has no rales  Abdominal: Soft  Bowel sounds are normal  He exhibits no distension  There is no rebound and no guarding  Musculoskeletal: Normal range of motion  He exhibits no edema  Lymphadenopathy:     He has no cervical adenopathy  Neurological: He is alert and oriented to person, place, and time  A sensory deficit (bilateral lower extremities, see other PE above) is present  No cranial nerve deficit  Skin: Skin is warm  Psychiatric: He has a normal mood and affect  His behavior is normal    Nursing note and vitals reviewed        Vital Signs  ED Triage Vitals [02/04/19 1153]   Temperature Pulse Respirations Blood Pressure SpO2   99 7 °F (37 6 °C) 74 18 138/71 96 %      Temp Source Heart Rate Source Patient Position - Orthostatic VS BP Location FiO2 (%)   Oral -- Sitting Left arm --      Pain Score       6           Vitals:    02/04/19 1153 02/04/19 1200 02/04/19 1215 02/04/19 1245   BP: 138/71 154/76 (!) 179/62    Pulse: 74 80 76 75   Patient Position - Orthostatic VS: Sitting          Visual Acuity      ED Medications  Medications   heparin (porcine) injection 9,200 Units (9,200 Units Intravenous Given 2/4/19 1249)   iohexol (OMNIPAQUE) 350 MG/ML injection (MULTI-DOSE) 100 mL (100 mL Intravenous Given 2/4/19 1335)       Diagnostic Studies  Results Reviewed     Procedure Component Value Units Date/Time    Protime-INR [[id number]]  (Normal) Collected:  02/04/19 1245    Lab Status:  Final result Specimen:  Blood from Arm, Left Updated:  02/04/19 1312     Protime 13 4 seconds      INR 1 08    APTT [[id number]]  (Normal) Collected:  02/04/19 1245    Lab Status:  Final result Specimen:  Blood from Arm, Left Updated:  02/04/19 1312     PTT 28 seconds     Basic metabolic panel [[id number]]  (Abnormal) Collected:  02/04/19 1245    Lab Status:  Final result Specimen:  Blood from Arm, Left Updated:  02/04/19 1311     Sodium 135 (L) mmol/L      Potassium 3 1 (L) mmol/L      Chloride 92 (L) mmol/L      CO2 33 (H) mmol/L      ANION GAP 10 mmol/L      BUN 36 (H) mg/dL      Creatinine 1 78 (H) mg/dL      Glucose 192 (H) mg/dL      Calcium 9 6 mg/dL      eGFR 36 ml/min/1 73sq m     Narrative:         National Kidney Disease Education Program recommendations are as follows:  GFR calculation is accurate only with a steady state creatinine  Chronic Kidney disease less than 60 ml/min/1 73 sq  meters  Kidney failure less than 15 ml/min/1 73 sq  meters      CBC and differential [[id number]]  (Abnormal) Collected:  02/04/19 1245    Lab Status:  Final result Specimen:  Blood from Arm, Left Updated:  02/04/19 1259     WBC 12 82 (H) Thousand/uL      RBC 5 13 Million/uL      Hemoglobin 16 4 g/dL      Hematocrit 48 6 %      MCV 95 fL      MCH 32 0 pg      MCHC 33 7 g/dL      RDW 13 5 %      MPV 11 6 fL      Platelets 943 Thousands/uL      nRBC 0 /100 WBCs      Neutrophils Relative 71 %      Immat GRANS % 0 %      Lymphocytes Relative 17 %      Monocytes Relative 9 %      Eosinophils Relative 3 %      Basophils Relative 0 %      Neutrophils Absolute 9 03 (H) Thousands/µL      Immature Grans Absolute 0 05 Thousand/uL      Lymphocytes Absolute 2 20 Thousands/µL      Monocytes Absolute 1 15 Thousand/µL      Eosinophils Absolute 0 34 Thousand/µL      Basophils Absolute 0 05 Thousands/µL     POCT Chem 8+ [[id number]]  (Abnormal) Collected:  02/04/19 1252    Lab Status:  Final result Updated:  02/04/19 1256     SODIUM, I-STAT 135 (L) mmol/l      Potassium, i-STAT 3 2 (L) mmol/L      Chloride, istat 88 (LL) mmol/L      CO2, i-STAT 32 mmol/L      Anion Gap, i-STAT 18 (H) mmol/L      Calcium, Ionized i-STAT 1 14 mmol/L      BUN, I-STAT 39 (H) mg/dl      Creatinine, i-STAT 1 5 (H) mg/dl      eGFR 45 ml/min/1 73sq m      Glucose, i-STAT 197 (H) mg/dl      Hct, i-STAT 52 (H) %      Hgb, i-STAT 17 7 (H) g/dl      Specimen Type VENOUS                 CTA abdominal w run off w wo contrast   Final Result by Ej Smiley DO (02/04 1546)   Patent aortobiiliac inflow with acute occlusion of the common femoral, superficial femoral, popliteal and tibioperoneal trunk arteries bilaterally  Bilateral peroneal arteries are reconstituted with flow to the ankle  Bilateral anterior tibial and    posterior tibial arteries are intermittently occluded with very weak distal reconstitution  Left renal midpole partially exophytic soft tissue density of unclear significance  This may represent old traumatic injury though benign or malignant lesion is not excluded  Given central fat density, benign etiology is favored  Ultrasound could be    performed for further characterization  Initial vascular findings were communicated with Dr Sidra Conner of the [facility] ED  Per him, the patient had already been transported  Per him, Dr Sonal Alex of vascular surgery was aware with planned emergent operation                 Workstation performed: [id number]                    Procedures  CriticalCare Time  Performed by: Alex Nye by: Jaqueline Olmedo     Critical care provider statement:     Critical care time (minutes):  90    Critical care time was exclusive of:  Separately billable procedures and treating other patients and teaching time    Critical care was necessary to treat or prevent imminent or life-threatening deterioration of the following conditions:  Circulatory failure    Critical care was time spent personally by me on the following activities:  Blood draw for specimens, obtaining history from patient or surrogate, re-evaluation of patient's condition, review of old charts, evaluation of patient's response to treatment, examination of patient, ordering and review of laboratory studies, ordering and performing treatments and interventions, development of treatment plan with patient or surrogate, ordering and review of radiographic studies and discussions with consultants           Phone Contacts  ED Phone Contact    ED Course  ED Course as of Feb 05 1241   Mon Feb 04, 2019   1253 Bedside ultrasound showed bilateral femoral arteries proximal to the profundus appears to be clotted off with inability to collapse the vein minimal to no color flow no dopplerable signal   Was able to visualize the aorta possibly superior to the umbilicus with ultrasound  Patient is not a candidate for CTA given history of poor kidney function and having partial nephrectomy on the left    [address] [facility] accepting, priority 1 transfer                                MDM  Number of Diagnoses or Management Options  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 [facility]): new and requires workup  Ischemia of both lower extremities: new and requires workup  Diagnosis management comments: Patient was accepted by Dr Yeyo Ochoa for emergent [facility]  CTA is pending, patient also now admits that he stopped taking his Eliquis a few weeks ago because it was making me tired patient has a high probability losing both legs, IV heparin was started immediately upon suspicion of diagnosis, called vascular also immediately upon suspicion of diagnosis of bilateral arterial occlusion of the lower extremities          Amount and/or Complexity of Data Reviewed  Clinical lab tests: reviewed and ordered  Tests in the radiology section of CPT®: ordered  Tests in the medicine section of CPT®: ordered and reviewed  Independent visualization of images, tracings, or specimens: yes    Patient Progress  Patient progress: stable      Disposition  Final diagnoses:   Ischemia of both lower extremities   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 ([facility] ) - bilateral     Time reflects when diagnosis was documented in both MDM as applicable and the Disposition within this note     Time User Action Codes Description Comment    2/4/2019  1:20 PM Lindsay Come Add [I99 8] Ischemia of both lower extremities     2/5/2019 12:37 PM Lindsay Come Add [I74 3] 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 ([facility] )     2/5/2019 12:37 PM Lindsay Come Modify [I74 3] 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 [facility]) bilateral      ED Disposition     ED Disposition Condition Date/Time Comment    Transfer to Another Facility  Mon Feb 4, 2019  1:20 PM Luz Marina Mcknight should be transferred out to B        MD Documentation      Most Recent Value   Patient Condition  The patient has been stabilized such that within reasonable medical probability, no material deterioration of the patient condition or the condition of the unborn child(rama) is likely to result from the transfer   Reason for Transfer  Level of Care needed not available at this facility   Benefits of Transfer  Specialized equipment and/or services available at the receiving facility (Include comment)________________________   Risks of Transfer  Potential for delay in receiving treatment, Potential deterioration of medical condition, Loss of IV, Increased discomfort during transfer   Accepting Physician  Etta Vaughan Name, [address] [facility] MD  [facility]   Provider Certification  General risk, such as traffic hazards, adverse weather conditions, rough terrain or turbulence, possible failure of equipment (including vehicle or aircraft), or consequences of actions of persons outside the control of the transport personnel      RN Documentation      [facility] Name, [address]   [facility]      Follow-up Information    None         Discharge Medication List as of 2/4/2019  2:03 PM      CONTINUE these medications which have NOT CHANGED    Details   apixaban (ELIQUIS) 5 mg Take 1 tablet by mouth 2 (two) times a day, Starting Fri 12/22/2017, Print      DULoxetine (CYMBALTA) 30 mg delayed release capsule Take 1 capsule by mouth daily, Starting Fri 12/22/2017, Print      gabapentin (NEURONTIN) 300 mg capsule Take 100 mg by mouth 3 (three) times a day, Historical Med      insulin aspart (NovoLOG) 100 units/mL injection Inject under the skin 3 (three) times a day before meals, Historical Med      insulin detemir (LEVEMIR) 100 units/mL subcutaneous injection Inject 50 Units under the skin daily at bedtime, Historical Med      levothyroxine 50 mcg tablet Take 1 tablet by mouth daily, Starting Fri 12/22/2017, Print      lisinopril (ZESTRIL) 5 mg tablet Take 1 tablet by mouth daily, Starting Fri 12/22/2017, Print      metoprolol succinate (TOPROL-XL) 100 mg 24 hr tablet Take 1 tablet by mouth daily, Starting Fri 12/22/2017, Print      rosuvastatin (CRESTOR) 40 MG tablet Take 40 mg by mouth daily, Until Discontinued, Historical Med           No discharge procedures on file      ED Provider  Electronically Signed by           Zeke Lima DO  02/05/19 7024

## 2019-02-05 NOTE — UTILIZATION REVIEW
Initial Clinical Review    Admission: Date/Time/Statement: 2/4/19 @ 1439   Orders Placed This Encounter   Procedures    Inpatient Admission     Standing Status:   Standing     Number of Occurrences:   1     Order Specific Question:   Admitting Physician     Answer:   Ethan Loyd     Order Specific Question:   Level of Care     Answer:   Critical Care [15]     Order Specific Question:   Estimated length of stay     Answer:   More than 2 Midnights     Order Specific Question:   Certification     Answer:   I certify that inpatient services are medically necessary for this patient for a duration of greater than two midnights  See H&P and MD Progress Notes for additional information about the patient's course of treatment  2/4/2019 (2 hours)  [facility] Emergency Department   Ischemia of both lower extremities   Transfer to [facility]   Prior to arrival    If heparin , ivf  9% ns 1L bolus    History of Illness:       70year old male received from [facility] ED  Bilateral lower extremity pain and inability to ambulate since  2-1-19       Requires  Emergency surgery     02/04/19 1757 02/04/19 1757 02/04/19 1757 02/04/19 1757 02/04/19 1757   97 8 °F (36 6 °C) 74 18 139/67 98 %      Pain Score       7       02/04/19 120 kg (264 lb 8 8 oz)     Vital Signs (abnormal):    95/47      Pertinent Labs/Diagnostic Test Results:      Physical exam   Bilateral lower extremities cool   Non palpable femorals   Lower extremities Cool/ dusky/ mottled bilaterally          Final result    pH, Art i-STAT 7 449    pCO2, Art i-STAT 52 5 (H)    pO2, ART i-STAT >400 0 (H)    BE, i-STAT 10 (H)    HCO3, Art i-STAT 36 4 (H)    CO2, i-STAT 38 (H)    SODIUM, I-STAT 134 (L)    Potassium, i-STAT 3 2 (L)     WBC  #1 13 52 (H)     WBC #2 19 87 (H)       LACTIC ACID 3 2 (HH)     WBC, UA 20-30 (A         Arterial    Sodium 135 (L)    Potassium 3 2 (L)    Chloride 96 (L)    CO2 25    ANION GAP 14 (H)    BUN 31 (H) Creatinine 1 54 (H)    Glucose 262 (H)     Total CK 11,685 (H)     CK-MB 64 5 (H)       CT angiogram there is patency of the aortoiliac segment  Both common femoral arteries are occluded by thrombus  The superficial femoral, popliteal and both popliteal trifurcations are occluded  There is some reconstitution of the distal tibial vessels  There is significant edema in the soft tissues and muscles of both lower extremities below the knee  Past Medical/Surgical History:     Diagnosis    Essential hypertension    Hyperlipidemia    Atrial fibrillation with rapid ventricular response (HCC)    Type 2 diabetes mellitus with stage 4 chronic kidney disease ([facility]    DDD (degenerative disc disease), lumbosacral    Chronic diastolic congestive heart failure ([facility] )    Hypothyroidism    Thrombus of left atrial appendage without antecedent myocardial infarction    Ischemia of both lower extremities         Admitting Diagnosis: Ischemia of both lower extremities [I99 8]       Age/Sex: 70 y o  male       Assessment/Plan:      Bilateral lower extremity ischemia  Ischemia is now over 50 hr old  Limbs are severely threatened and possibly nonsalvageable  Discussed the options available and the possibility of non salvage ability of both lower extremities and potential need for amputation  Patient appears to understand  We will plan on proceeding emergently with bilateral arterial exploration with thromboembolectomy and bilateral lower extremity fasciotomy       Anesthesia Start Date/Time: 02/04/19 1446   Procedures:       Bilateral femoral thromboembolectomy with primary closure, Bilateral 4 compartment fasciotomy (Bilateral Leg Lower)      APPLICATION VAC DRESSING (Bilateral )   Anesthesia type: general   Diagnosis: Ischemia of both lower extremities [I99 8]   Pre-op diagnosis: Ischemia of both lower extremities [I99 8]     Procedure(s) (LRB):  Bilateral femoral thromboembolectomy with primary closure, Bilateral 4 compartment fasciotomy (Bilateral)  Negative Pressure Wound Therapy (V A C ) Leg Right  Negative Pressure Wound Therapy (V A C ) Leg Outer;Left       Operative Findings:  Large amount of thrombus removed from both common femoral arteries, deep femoral arteries and superficial femoral arteries via transverse incision  Excellent back flow was encountered following embolectomy  Of note embolectomy catheter was passed approximately 60 cm on the right and 40 cm on the left  Excellent inflow was established  At the time of fasciotomy muscle was viable bilaterally    The feet had regained some pink color and sluggish capillary refill at the conclusion of the procedure        Complications:   None    Post op critical care     Admission Orders:    Critical care post op  Fingerstick glucose for critical care / insulin gtt   Sequential compression device   PT and OT eval and treat   Neurovascular check q2 hr   Wound vac bilateral   Capnography   PCA   Dilaudid    Arterial line      docusate sodium 100 mg Oral BID PRN   DULoxetine 30 mg Oral Daily   gabapentin 100 mg Oral TID   heparin (porcine) 3-30 Units/kg/hr (Order-Specific) Intravenous Titrated   heparin (porcine) 4,600 Units Intravenous PRN   heparin (porcine) 9,200 Units Intravenous PRN   HYDROmorphone 0 5 mg Intravenous Q3H PRN   insulin regular (HumuLIN R,NovoLIN R) infusion 0 3-21 Units/hr Intravenous Titrated   levothyroxine 50 mcg Oral Early Morning   metoprolol tartrate 50 mg Oral Q12H [address]   multi-electrolyte 100 mL/hr Intravenous Continuous   naloxone 0 04 mg Intravenous Q1MIN PRN   ondansetron 4 mg Intravenous Q4H PRN   oxyCODONE 2 5 mg Oral Q4H PRN   Or      oxyCODONE 5 mg Oral Q4H PRN   QUEtiapine 50 mg Oral HS   senna-docusate sodium 2 tablet Oral Daily

## 2019-02-05 NOTE — PLAN OF CARE
CARDIOVASCULAR - ADULT     Maintains optimal cardiac output and hemodynamic stability Progressing     Absence of cardiac dysrhythmias or at baseline rhythm Progressing        DISCHARGE PLANNING     Discharge to home or other facility with appropriate resources Progressing        GASTROINTESTINAL - ADULT     Minimal or absence of nausea and/or vomiting Progressing     Maintains or returns to baseline bowel function Progressing     Maintains adequate nutritional intake Progressing     Establish and maintain optimal ostomy function Progressing        GENITOURINARY - ADULT     Maintains or returns to baseline urinary function Progressing     Absence of urinary retention Progressing     Urinary catheter remains patent Progressing        HEMATOLOGIC - ADULT     Maintains hematologic stability Progressing        INFECTION - ADULT     Absence or prevention of progression during hospitalization Progressing     Absence of fever/infection during neutropenic period Progressing        Knowledge Deficit     Patient/family/caregiver demonstrates understanding of disease process, treatment plan, medications, and discharge instructions Progressing        METABOLIC, FLUID AND ELECTROLYTES - ADULT     Electrolytes maintained within normal limits Progressing     Fluid balance maintained Progressing     Glucose maintained within target range Progressing        MUSCULOSKELETAL - ADULT     Maintain or return mobility to safest level of function Progressing     Maintain proper alignment of affected body part Progressing        NEUROSENSORY - ADULT     Achieves stable or improved neurological status Progressing     Absence of seizures Progressing     Remains free of injury related to seizures activity Progressing     Achieves maximal functionality and self care Progressing        Nutrition/Hydration-ADULT     Nutrient/Hydration intake appropriate for improving, restoring or maintaining nutritional needs Progressing PAIN - ADULT     Verbalizes/displays adequate comfort level or baseline comfort level Progressing        Potential for Falls     Patient will remain free of falls Progressing        Prexisting or High Potential for Compromised Skin Integrity     Skin integrity is maintained or improved Progressing        RESPIRATORY - ADULT     Achieves optimal ventilation and oxygenation Progressing        SAFETY ADULT     Maintain or return to baseline ADL function Progressing     Maintain or return mobility status to optimal level Progressing        SKIN/TISSUE INTEGRITY - ADULT     Skin integrity remains intact Progressing     Incision(s), wounds(s) or drain site(s) healing without S/S of infection Progressing     Oral mucous membranes remain intact Progressing

## 2019-02-05 NOTE — PROGRESS NOTES
Vascular Surgery      Called by nursing to change Right medial fasciotomy site VAC due to no seal and leaking  Right medial calf VAC taken down  Venous bleeding noted from wound likely evacuation of hematoma under pressure  Manual pressure held and Dr Tyler Villa notified  One 3-0 nylon suture placed to bleeding surface vein by Dr Tyler Villa  Further hematoma evacuated from posterior calf  Wound packed with Kerlex  Right leg VAC discontinued  Adaptic, 4x4's, ABD, Kerlix and ACE wrap dressing placed to lower limb  No active bleeding noted at the time of dressing replacement  Will continue to monitor and reevaluate wound in AM for appropriateness of VAC replacement  Nursing staff aware      Gregorio Mena PA-C  2/5/2019

## 2019-02-05 NOTE — MEDICAL STUDENT
Medical Student  This note is not part of the legal medical record  Progress Note - Critical Care   Mago Andre 70 y o  male MRN: [id number]  Unit/Bed#:  ICU  Encounter: [id number]      HPI/24hr events:   Mr Elizabeth Desai is a 71 yo male with PMH of newly diagnosed atrial fibrillation in December, non-compliant with Eliquis, hx carotid endarterectomy, and possible dementia who presnted to [address] [place] [address] on  for edema, numbness, weakness and pain in his BL LEs and inability to walk x 2 days  He was found to have no pulses in the setting of mottled BL lower extremities  He was given a CTA which showed occlusion of the common femoral, superficial femoral, popliteal and tibioperoneal arteries bilaterally  He was transferred to [facility] for vascular surgery workup  Vascular surgery saw him and proceeded with an emergent BL arterial exploration with thromboembolectomy and BL 4 compartment fasciotomy  During the procedure, his feet had regained some color and capillary refill and he was given wound vacs to suction on all wounds  Post operatively, his left pedal pulse and posterior tibial pulses were absent and his left foot was cyanotic  His right pedal was found via doppler  He was transferred to the ICU and extubated without difficulty  Overnight, his urine output dropped to about 50/hr, his BUN was 35, Cr 1 85, and his CK was extremely elevated to 11,685 so he received 2 500L boluses  Physical Exam:     Vitals:    19 0415 19 0500 19 0515 19 0615   BP: 138/54  124/50 152/54   Pulse: 88 86 88 84   Resp: 17 12 21 15   Temp:       TempSrc:       SpO2: 99% 98% 98% 97%   Weight:       Height:         Arterial Line BP: 152/54  Arterial Line MAP (mmHg): 76 mmHg    Temperature:   Temp (24hrs), Av 3 °F (36 8 °C), Min:97 6 °F (36 4 °C), Max:99 7 °F (37 6 °C)    Current: Temperature: 98 °F (36 7 °C)    General: Older, drowsy gentleman   GCS 15 (obeys commands, opens eyes spontaneously, confused but he knows who he his and that he is in a hospital    CV: regular rate and rhythm, no discernable murmurs  Pulmonary: no wheezes, rhonchi or rales, normal effort  Abdomen: non-tender to palpation, non-distended  MSK:   - LLE: Wound vac to continuous suction  Wound is c/d/i with minimal sanguinous discharge in wound vac  LLE cool to touch  No pulses found on doppler    - RLE: Wound vac set to continuous suction, with 250 cc sanguinous discharge  Wound is oozing blood and serous discharge  RLE warmer than left, sluggish DP doppler signal        Lab Results   Component Value Date    PHART 7 424 02/04/2019    ZUE3MIW 37 4 02/04/2019    PO2ART 82 4 02/04/2019    HOE6WYF 23 9 02/04/2019    BEART -0 2 02/04/2019    SOURCE Line, Arterial 02/04/2019     Intake and Outputs:  I/O       02/03 0701 - 02/04 0700 02/04 0701 - 02/05 0700    I V  (mL/kg)  3893 6 (32 4)    IV Piggyback  350    Total Intake(mL/kg)  4243 6 (35 4)    Urine (mL/kg/hr)  1210    Drains  200    Blood  400    Total Output   1810    Net   +2433 6              UOP: 75ml/hour, slowed down to 50ml/hr around 5 am ( 41ml/kg/hr)    Nutrition:        Diet Orders            Start     Ordered    02/04/19 1912  Diet Shahbaz/CHO Controlled; Consistent Carbohydrate Diet Level 1 (4 carb servings/60 grams CHO/meal)  Diet effective now     Question Answer Comment   Diet Type Shahbaz/CHO Controlled    Shahbaz/CHO Controlled Consistent Carbohydrate Diet Level 1 (4 carb servings/60 grams CHO/meal)    RD to adjust diet per protocol?  No        02/04/19 1912          Labs:     Results from last 7 days  Lab Units 02/05/19  0556 02/04/19  1814 02/04/19  1505  02/04/19  1245   WBC Thousand/uL 19 87* 13 52*  --   --  12 82*   HEMOGLOBIN g/dL 11 3* 13 8  --   --  16 4   I STAT HEMOGLOBIN g/dl  --   --  16 0  < >  --    HEMATOCRIT % 34 8* 41 7  --   --  48 6   HEMATOCRIT, ISTAT %  --   --  47  < >  --    PLATELETS Thousands/uL 167 154  --   --  177   NEUTROS PCT % 84*  -- --   --  71   MONOS PCT % 9  --   --   --  9   < > = values in this interval not displayed  Results from last 7 days  Lab Units 02/05/19  0556 02/04/19  2358 02/04/19  1814 02/04/19  1505 02/04/19  1252   SODIUM mmol/L 134* 134* 135*  --   --    POTASSIUM mmol/L 3 8 3 7 3 2*  --   --    CHLORIDE mmol/L 97* 97* 96*  --   --    CO2 mmol/L 28 27 25  --   --    CO2, I-STAT mmol/L  --   --   --  38* 32   BUN mg/dL 38* 35* 31*  --   --    CREATININE mg/dL 1 94* 1 85* 1 54*  --   --    CALCIUM mg/dL 7 6* 8 1* 8 5  --   --    ALK PHOS U/L 63  --   --   --   --    ALT U/L 34  --   --   --   --    AST U/L 211*  --   --   --   --    GLUCOSE, ISTAT mg/dl  --   --   --  183* 197*       Results from last 7 days  Lab Units 02/05/19  0556 02/04/19  2358   MAGNESIUM mg/dL 2 2 2 3       Results from last 7 days  Lab Units 02/05/19  0556   PHOSPHORUS mg/dL 3 9        Results from last 7 days  Lab Units 02/05/19  0605 02/04/19  2358 02/04/19  1922 02/04/19  1814 02/04/19  1245   INR   --   --   --  1 19* 1 08   PTT seconds 113* 160* >210* >210* 28       Results from last 7 days  Lab Units 02/05/19  0624   LACTIC ACID mmol/L 2 7*       0  Lab Value Date/Time   TROPONINI 0 07 (H) 12/19/2017 1339   TROPONINI 0 04 (H) 01/30/2017 0056       Imaging:   CTA  IMPRESSION:  Patent aortobiiliac inflow with acute occlusion of the common femoral, superficial femoral, popliteal and tibioperoneal trunk arteries bilaterally  Bilateral peroneal arteries are reconstituted with flow to the ankle  Bilateral anterior tibial and   posterior tibial arteries are intermittently occluded with very weak distal reconstitution      Left renal midpole partially exophytic soft tissue density of unclear significance  This may represent old traumatic injury though benign or malignant lesion is not excluded  Given central fat density, benign etiology is favored    Ultrasound could be performed for further characterization      Initial vascular findings were communicated with Dr Pita Lyn of the [facility] ED  Per him, the patient had already been transported  Per him, Dr Brett Worthington of vascular surgery was aware with planned emergent operation  Micro:  Lab Results   Component Value Date    URINECX No Growth <1000 cfu/mL 01/29/2017       Allergies:    Allergies   Allergen Reactions    Acetaminophen      "after I take it it makes the pain worse"    Motrin [Ibuprofen] GI Intolerance       Medications:   Scheduled Meds:    Current Facility-Administered Medications:  atorvastatin 40 mg Oral Daily With Dinner Brissa Dumont    docusate sodium 100 mg Oral BID PRN Brissa Dumont    DULoxetine 30 mg Oral Daily Brissa Dumont    gabapentin 100 mg Oral TID Brissa Dumont    heparin (porcine) 3-30 Units/kg/hr (Order-Specific) Intravenous Titrated Brissa Dumont Last Rate: Stopped (02/05/19 0104)   heparin (porcine) 4,600 Units Intravenous PRN Brissa Dumont    heparin (porcine) 9,200 Units Intravenous PRN Brissa Dumont    HYDROmorphone  Intravenous Continuous Sebastian Burkett MD    insulin regular (HumuLIN R,NovoLIN R) infusion 0 3-21 Units/hr Intravenous Titrated Sebastian Burkett MD Last Rate: 5 Units/hr (02/05/19 1019)   Joann Perkins ON 2/6/2019] metoprolol succinate 100 mg Oral Daily Sebastian Burkett MD    multi-electrolyte 200 mL/hr Intravenous Continuous Sebastian Burkett MD Last Rate: 200 mL/hr (02/05/19 0546)   naloxone 0 04 mg Intravenous Q1MIN PRN Sebastian Burkett MD    ondansetron 4 mg Intravenous Q4H PRN Brissa Dumont    QUEtiapine 50 mg Oral HS Sebastian Burkett MD    senna-docusate sodium 2 tablet Oral Daily Sebastian Burkett MD      Continuous Infusions:    heparin (porcine) 3-30 Units/kg/hr (Order-Specific) Last Rate: Stopped (02/05/19 8213)   HYDROmorphone     insulin regular (HumuLIN R,NovoLIN R) infusion 0 3-21 Units/hr Last Rate: 5 Units/hr (02/05/19 1786)   multi-electrolyte 200 mL/hr Last Rate: 200 mL/hr (02/05/19 0546)     PRN Meds:    docusate sodium 100 mg BID PRN   heparin (porcine) 4,600 Units PRN   heparin (porcine) 9,200 Units PRN   naloxone 0 04 mg Q1MIN PRN   ondansetron 4 mg Q4H PRN     Invasive lines and devices: Invasive Devices     Peripheral Intravenous Line            Peripheral IV 02/04/19 Left Arm less than 1 day    Peripheral IV 02/04/19 Left Hand less than 1 day    Peripheral IV 02/04/19 Right Antecubital less than 1 day    Peripheral IV 02/04/19 Right Hand less than 1 day          Arterial Line            Arterial Line 02/04/19 Right Radial less than 1 day          Drain            Negative Pressure Wound Therapy (V A C ) Leg Left; Inner; Outer; Lower less than 1 day    Negative Pressure Wound Therapy (V A C ) Leg Right; Inner; Outer; Lower less than 1 day    Urethral Catheter Latex 16 Fr  less than 1 day                Plan:     Neuro:   - GCS 14: Patient is somewhat confused, keeps saying "it's in my body;" however he does know who he is and that he is in a hospital     - it is unknown whether this is his baseline/cognitive impairment or 2/2 delirium  - Pain control: dilaudid 1mg PCA, gabapentin TID  - Continue home Seroquel qhs, and cymbalta    CV:   - Peripheral vascular disease with BL cclusion of the common femoral, superficial femoral, popliteal and tibioperoneal arteries   - POD 1  BL arterial thromboembolectomy and BL 4 compartment fasciotomy   - wound vacs on all wounds set to continuous suction; R vac put out 350 serosanguinous overnight   - heparin gtt   - likely amputation of LLE, +/- amputation RLE   - vascular surgery following   - frequent neurovascular checks  - MAPs 66-84, BPs 120s-150s/40s-50s  - chronic diastolic CHF: 65/18 ECHO showed EF 40%  - atrial fibrillation: diagnosed 12/18, currently rate controlled with HR consistently in the 80s   - on lopressor 100 mg qd   - monitor on telemetry  - essential HTN     Lung:   - Respiratory insufficiency   - on 2L NC   - incentive spirometry, maintain SpO2 >92%    - respiratory therapy saw him and discontinued respiratory protocol      GI:   - advance diet as tolerated  - NPO if going ahead with LLE amputations   - colace as needed     FEN:   - currently on isolyte @ 200 ml/hr  - monitor electrolytes and replete as needed  - K 3 8, Mag 2 2, Phos 3 9     :   - SEVEN 2/2 rhabdomyolysis in the setting of limb ischemia   - Cr 1 85-->1 94 (baseline 1 59), CK >11,000, CK-MB 64 5   - received 2 500 ml boluses, currently on isolyte at 200/hr    - repeat CK and BMP, f/u UA    - start Lasix 40mg IV   - monitor I/Os: currently UO 50cc/hr ( 41/kg/hr)  - Stage 4 CKD with baseline ~Cr 1 59, baseline BUN 39     ID:   - WBC trending up: 12 82--> 13 52--> 19 87  - Afebrile  - Received 1 g ancef IV perioperatively  - continue to monitor      Heme:   - WBC elevated to 19 87, afebrile  - Hgb dropped from 16 4--> 13 8--> 11 3  - H/H q8h, follow up FOBT  - minimal blood loss during surgery     Endo:   - hypothyroidism: placed on synthroid in 12/18   - restart home synthroid 50 mcg qd AM on empty stomach  - DM type II, poorly controlled    - currently on insulin gtt with q2 BG checks   - BG ranging from 192-295, goal is 140-180   - continue on insulin               Msk/Skin:   - lumbosacral degenerative disc disease  - reposition, monitor for ulcers  - PT/OT consults     Disposition:   - continue ICU care  - possible LLE amputation       Code Status: Level 1 - Full Code     Blanca Bell

## 2019-02-05 NOTE — CONSULTS
Consultation - Cardiology   Anuradha Campos 70 y o  male MRN: [id number]  Unit/Bed#:  -01 Encounter: [id number]    Assessment/Plan     Assessment:    Persistent Atrial Fibrillation  Bilateral Critical limb ischemia  LV systolic dysfunction  LVEF 40%  Plan:    Persistent Atrial Fibrillation: He has hx of LA appendage thrombus  He did not followup with us after prior hospitalization  He is arousable but cannot provide me any history at this time  Nevertheless he is in normal sinus rhythm at this time  Will get 12 lead EKG to document sinus rhythm  Continue oral metoprolol as ordered  Lifelong anticoagulation  Currently on IV heparin  Will update echocardiogram given history of LVEF 40% and maryjo medical therapy accordingly  History of Present Illness   Physician Requesting Consult: Gareth Robbins MD  Reason for Consult / Principal Problem: Atrial Fibrillation  HPI: Anuradha Campos is a 70y o  year old male who presents with bilateral lower extremity paresthesia and pain secondary to critical limb ischemia  CTA of the abdomen with runoff revealed acute occlusion of the common femoral, SFA, popliteal and TP trunk bilaterally  The patient was transferred to [place] and underwent surgery  He has a history of persistent atrial fibrillation  He had a CORRINA in December of 2017 that revealed left atrial appendage thrombus and LVEF of 40%  Rate control and anticoagulation was recommended with consideration for future CORRINA guided cardioversion in the future  He did not followup  He is currently easily arousable but he cannot provide any history at this time       Inpatient consult to Cardiology  Consult performed by: Cee Zamarripa  Consult ordered by: Susana Tay          Review of Systems   Unable to perform ROS: Mental status change       Historical Information   Past Medical History:   Diagnosis Date    [facility]est[facility] failure[facility] [address] [facility]     Diabetes mellitus ([facility] [address] [facility]     Hypertension      Past Surgical History:   Procedure Laterality Date    CAROTID ENDARTERECTOMY Left      History   Alcohol Use No     History   Drug Use No     History   Smoking Status    Former Smoker   Smokeless Tobacco    Never Used     Family History:   Family History   Problem Relation Age of Onset    Cancer Mother     Cancer Father        Meds/Allergies   current meds:   Current Facility-Administered Medications   Medication Dose Route Frequency    docusate sodium (COLACE) capsule 100 mg  100 mg Oral BID PRN    DULoxetine (CYMBALTA) delayed release capsule 30 mg  30 mg Oral Daily    gabapentin (NEURONTIN) capsule 100 mg  100 mg Oral TID    heparin (porcine) 25,000 units in 250 mL infusion (premix)  3-30 Units/kg/hr (Order-Specific) Intravenous Titrated    heparin (porcine) injection 4,600 Units  4,600 Units Intravenous PRN    heparin (porcine) injection 9,200 Units  9,200 Units Intravenous PRN    HYDROmorphone (DILAUDID) injection 0 5 mg  0 5 mg Intravenous Q3H PRN    insulin regular (HumuLIN R,NovoLIN R) 1 Units/mL in sodium chloride 0 9 % 100 mL infusion  0 3-21 Units/hr Intravenous Titrated    levothyroxine tablet 50 mcg  50 mcg Oral Early Morning    metoprolol tartrate (LOPRESSOR) tablet 50 mg  50 mg Oral Q12H [address]    multi-electrolyte (ISOLYTE-S PH 7 4 equivalent) IV solution  100 mL/hr Intravenous Continuous    naloxone (NARCAN) 0 04 mg/mL syringe 0 04 mg  0 04 mg Intravenous Q1MIN PRN    ondansetron (ZOFRAN) injection 4 mg  4 mg Intravenous Q4H PRN    oxyCODONE (ROXICODONE) IR tablet 2 5 mg  2 5 mg Oral Q4H PRN    Or    oxyCODONE (ROXICODONE) IR tablet 5 mg  5 mg Oral Q4H PRN    QUEtiapine (SEROquel) tablet 50 mg  50 mg Oral HS    senna-docusate sodium (SENOKOT S) 8 6-50 mg per tablet 2 tablet  2 tablet Oral Daily     Allergies   Allergen Reactions    Acetaminophen      "after I take it it makes the pain worse"    Motrin [Ibuprofen] GI Intolerance       Objective   Vitals: Blood pressure 150/60, pulse 82, temperature 98 °F (36 7 °C), temperature source Oral, resp  rate (!) 26, height 5' 10" (1 778 m), weight 120 kg (264 lb 8 8 oz), SpO2 98 %  Orthostatic Blood Pressures      Most Recent Value   Blood Pressure  150/60 filed at 02/05/2019 1048   Patient Position - Orthostatic VS  Lying filed at 02/04/2019 1925            Intake/Output Summary (Last 24 hours) at 02/05/19 1057  Last data filed at 02/05/19 1025   Gross per 24 hour   Intake          6226 12 ml   Output             2090 ml   Net          4136 12 ml       Invasive Devices     Peripheral Intravenous Line            Peripheral IV 02/04/19 Left Arm less than 1 day    Peripheral IV 02/04/19 Left Hand less than 1 day    Peripheral IV 02/04/19 Right Antecubital less than 1 day    Peripheral IV 02/04/19 Right Hand less than 1 day          Arterial Line            Arterial Line 02/04/19 Right Radial less than 1 day          Drain            Negative Pressure Wound Therapy (V A C ) Leg Left; Inner; Outer; Lower less than 1 day    Negative Pressure Wound Therapy (V A C ) Leg Right; Inner; Outer; Lower less than 1 day    Urethral Catheter Latex 16 Fr  less than 1 day                Physical Exam   Constitutional: He appears lethargic  No distress  HENT:   Mouth/Throat: No oropharyngeal exudate  Eyes: No scleral icterus  Neck: No JVD present  Cardiovascular: Normal rate and regular rhythm  No murmur heard  Pulmonary/Chest: Effort normal and breath sounds normal  No respiratory distress  He has no wheezes  He has no rales  Abdominal: Soft  Bowel sounds are normal  He exhibits no distension  There is no tenderness  There is no rebound  Musculoskeletal: He exhibits edema  Neurological: He appears lethargic  Skin: Skin is warm and dry  He is not diaphoretic  Lab Results:   I have personally reviewed pertinent lab results      CBC with diff:   Results from last 7 days  Lab Units 02/05/19  0556   WBC Thousand/uL 19 87*   RBC Million/uL 3 55*   HEMOGLOBIN g/dL 11 3*   HEMATOCRIT % 34 8*   MCV fL 98   MCH pg 31 8   MCHC g/dL 32 5   RDW % 13 8   MPV fL 11 8   PLATELETS Thousands/uL 167     CMP:   Results from last 7 days  Lab Units 02/05/19  0556  02/04/19  1505   SODIUM mmol/L 134*  < >  --    POTASSIUM mmol/L 3 8  < >  --    CHLORIDE mmol/L 97*  < >  --    CO2 mmol/L 28  < >  --    CO2, I-STAT mmol/L  --   --  38*   BUN mg/dL 38*  < >  --    CREATININE mg/dL 1 94*  < >  --    GLUCOSE, ISTAT mg/dl  --   --  183*   CALCIUM mg/dL 7 6*  < >  --    AST U/L 211*  --   --    ALT U/L 34  --   --    ALK PHOS U/L 63  --   --    EGFR ml/min/1 73sq m 33  < >  --    < > = values in this interval not displayed  Troponin:   0  Lab Value Date/Time   TROPONINI 0 07 (H) 12/19/2017 1339   TROPONINI 0 04 (H) 01/30/2017 0056     BNP:   Results from last 7 days  Lab Units 02/05/19  0556  02/04/19  1505   POTASSIUM mmol/L 3 8  < >  --    CHLORIDE mmol/L 97*  < >  --    CO2 mmol/L 28  < >  --    CO2, I-STAT mmol/L  --   --  38*   BUN mg/dL 38*  < >  --    CREATININE mg/dL 1 94*  < >  --    GLUCOSE, ISTAT mg/dl  --   --  183*   CALCIUM mg/dL 7 6*  < >  --    EGFR ml/min/1 73sq m 33  < >  --    < > = values in this interval not displayed  Coags:   Results from last 7 days  Lab Units 02/05/19  0605  02/04/19  1814   PTT seconds 113*  < > >210*   INR   --   --  1 19*   < > = values in this interval not displayed  TSH:     Magnesium:   Results from last 7 days  Lab Units 02/05/19  0556   MAGNESIUM mg/dL 2 2     Lipid Profile:     Imaging: I have personally reviewed pertinent films in PACS  EKG: pending  Code Status: Level 1 - Full Code  Advance Directive and Living Will:      Power of :    POLST:      Counseling / Coordination of Care  Total floor / unit time spent today 45 minutes  Greater than 50% of total time was spent with the patient and / or family counseling and / or coordination of care    A description of the counseling / coordination of care

## 2019-02-05 NOTE — ASSESSMENT & PLAN NOTE
[place] IIb acute ischemia in the setting of left atrial thrombus and patient stopping to take anticoagulation  Patient presenting with several days of decreased/no motor function and no distal signals    OR 2/4 b/l femoral embolectomy w/ b/l lower limb fasciotomies  Cont heparin gtt  Cont VAC to lower legs to low continuous 125  High likelihood that the patient will need at least an amputation to left leg - will continue to follow  Right leg may prove salvageable

## 2019-02-06 PROBLEM — N17.9 AKI (ACUTE KIDNEY INJURY) (HCC): Status: ACTIVE | Noted: 2019-01-01

## 2019-02-06 NOTE — OCCUPATIONAL THERAPY NOTE
OT CANCEL NOTE    OT orders received  Chart reviewed  Pt is currently not appropriate for skilled OT services 2* to B/L critical limb ischemia and will likely need LLE amputation and possible R LE amputation  Will hold initial OT evaluation  Spoke w/ RN regarding pt's current status  Will D/C OT at this time  Please re-consult post-op w/ updated activity and weight bearing orders, or when medically appropriate to engage in skilled OT services  Thanks         Elizabeth CONRAD, OTR/L

## 2019-02-06 NOTE — PROGRESS NOTES
Surgery  Johnkalpesh Goodman 70 y o  male MRN: [id number]  Unit/Bed#: 3150 [facility] 205-01 Encounter: [id number]    V  A C  Procedure Note    Date: 2/6/19  Time: 0930    Location of wound: Right leg lateral and medial fasciotomy    Sponges removed:  3 Black Sponges  0 White Sponges    Dimensions of wound: Lateral and medial each 12 x 4 x 0 5    Description of wound:               Sponges placed:  3 Black Sponges  0 White Sponges    VAC settings:  125 mmHg  Continuous    Pt tolerated procedure well  VAC sticker placed to wound dressing & paper chart, per protocol  EPIC wound documentation updated      Angus Oscar MD  2/6/2019

## 2019-02-06 NOTE — ASSESSMENT & PLAN NOTE
[place] IIb acute ischemia in the setting of left atrial thrombus and patient stopping to take anticoagulation  Patient presenting with several days of decreased/no motor function and no distal signals    OR 2/4 b/l femoral embolectomy w/ b/l lower limb fasciotomies  Cont heparin gtt  - lifelong anticoagulation per cardiology  Cont VAC to left lower leg to low continuous 125  High likelihood that the patient will need at least an amputation to left leg - will continue to follow  Right leg may prove salvageable

## 2019-02-06 NOTE — PROGRESS NOTES
Surgery  Clayton Fletcher 70 y o  male MRN: [id number]  Unit/Bed#: 3150 [facility] 205-01 Encounter: [id number]    V  A C  Procedure Note    Date: 2/6/19  Time: 0930    Location of wound: Left fasciotomy sites    Sponges removed:  3 Black Sponges  0 White Sponges    Dimensions of wound: Medial and lateral each approx 11 x 3 x 0 5     Description of wound: Dark, bulging muscle    Sponges placed:  3 Black Sponges  0 White Sponges    VAC settings:  125 mmHg  Continuous    Pt tolerated procedure well  VAC sticker placed to wound dressing & paper chart, per protocol  EPIC wound documentation updated      Clifton Prince MD  2/6/2019

## 2019-02-06 NOTE — PHYSICAL THERAPY NOTE
Physical Therapy Cancellation Note    Orders for PT eval received and chart review completed  Pt not appropriate for participation in skilled PT at present pt dx w/ bilateral critical limb ischemia and is s/p B CFAE with thrombectomy, b/l 4 compartment fasciotomy  VAC in place on L, dressing in place on R  Per MD- may require LLE amputation, and possible  RLE amp also  (per notes)  PT to sign off for now- please re-consult when appropriate for mobilization or post op w/ updated orders   Thank  Chon Mcginnis, PT

## 2019-02-06 NOTE — PROGRESS NOTES
Pt seen by surgical critical care team and Dr Shanita Adams with vascular team  Dilaudid PCA stopped because pt is lethargic and unable to use on own  Prn dilaudid ordered  Pt lethargic, wakens, but is confused  Remains on Insulin infusion with Q2hr FBS  Increased to algorithm 2

## 2019-02-06 NOTE — MEDICAL STUDENT
Progress Note - Critical Care   Jhon Goodman 70 y o  male MRN: [id number]  Unit/Bed#:  ICU  Encounter: [id number]    HPI/24hr events:   Vascular came to change Mr Deepak Scott right wound vac yesterday d/t to no seal and sanguinous seepage  Per vascular, a large of amount of venous bleeding occurred when wound vac was changed and packed  Overnight, Mr Danny Wright spiked a fever of 101 3 at 2200  He received tylenol, which resolved his fever  Shortly afterward, his BP dropped to 68/34 with a MAP of 54, so he received a 1L bolus of isolyte, and his BP's ann appropriately to 90s-130s/40s-50s and his MAPs to 60s-70s  His BUN and Cr has continued to rise from 34 and 1 9 yesterday to 44 and 2 10 today  His CK peaked yesterday at [address] and started to come down today to [address]  However his UO picked up and he has put out 100-125 cc/hr since 0100  His hemoglobin dropped yesterday from 11 3 to 10 to 8 to 7 8 as of this morning  Mr Danny Wright seems more confused this morning than he did yesterday and was unable to state where he was or what year it is  He says his pain is okay when we don't touch him, but his legs hurt a lot when we are assessing for pulses  Physical Exam:     Vitals:    19 0300 19 0400 19 0500 19 0555   BP:       BP Location:       Pulse: 82 80 82 84   Resp: (!) 23 (!) 24 (!) 25 (!) 25   Temp:  98 9 °F (37 2 °C)     TempSrc:  Oral     SpO2: 95% 97% 97% 100%   Weight:       Height:         Arterial Line BP: 146/48  Arterial Line MAP (mmHg): 74 mmHg    Temperature:   Temp (24hrs), Av 5 °F (37 5 °C), Min:98 °F (36 7 °C), Max:101 3 °F (38 5 °C)    Current: Temperature: 98 9 °F (37 2 °C)    Weights:   IBW: 73 kg    Body mass index is 37 96 kg/m²    Weight (last 2 days)     Date/Time   Weight    19 1925  120 (264 55)                 Non-Invasive/Invasive Ventilation Settings:  Respiratory    Lab Data (Last 4 hours)    None         O2/Vent Data (Last 4 hours) None              No results found for: PHART, XHX3IFY, PO2ART, RBD6VKA, X2IYJKRF, BEART, SOURCE  SpO2: SpO2: 100 %    Intake and Outputs:  I/O       02/04 0701 - 02/05 0700 02/05 0701 - 02/06 0700    P  O   200    I V  (mL/kg) 5001 7 (41 7) 4185 8 (34 9)    IV Piggyback 350     Total Intake(mL/kg) 5351 7 (44 6) 4385 8 (36 5)    Urine (mL/kg/hr) 1225 1120 (0 4)    Drains 350 100    Blood 400     Total Output 1975 1220    Net +3376 7 +3165 8              UOP: 100-125 ml/hr for past 6 hours (1 cc/kg/hr)    General: Older very drowsy gentleman in some pain  GCS 14 (obeys commands, opens eyes spontaneously, confused but knows who he is  Does not know where he is or what year it is)  CV: regular rate and rhythm, no discernable murmus  Pulm: lungs clear to auscultation, no visible effort on NC  Abd: non-distended, abd sounds present, non-tender, femoral incisions c/d/i  MSK:   - RLE: wound packed, foot pink and warm to touch, painful when moved  Patient unable to move toes on command  Unable to find DP, PT, or popliteal pulses on doppler  - LLE: wound vac to continuous suction with 100 cc sanguinous discharge  Foot cool to touch, painful when moved, mottled in appearance  Patient unable to move toes on command  Unable to find DP, PT, or popliteal pulses on doppler  Nutrition:        Diet Orders            Start     Ordered    02/04/19 1912  Diet Shahbaz/CHO Controlled; Consistent Carbohydrate Diet Level 1 (4 carb servings/60 grams CHO/meal)  Diet effective now     Question Answer Comment   Diet Type Shahbaz/CHO Controlled    Shahbaz/CHO Controlled Consistent Carbohydrate Diet Level 1 (4 carb servings/60 grams CHO/meal)    RD to adjust diet per protocol?  No        02/04/19 1912        Labs:     Results from last 7 days  Lab Units 02/06/19  0429 02/05/19  2350 02/05/19  1322 02/05/19  0556   WBC Thousand/uL 16 51* 16 58*  --  19 87*   HEMOGLOBIN g/dL 7 8* 8 0* 10 0* 11 3*   HEMATOCRIT % 24 1* 24 6*  --  34 8*   PLATELETS Thousands/uL 120* 131*  --  167   NEUTROS PCT % 83* 81*  --  84*   MONOS PCT % 7 8  --  9      Results from last 7 days  Lab Units 02/06/19 0428 02/05/19 2244 02/05/19  1757  02/05/19  0556  02/04/19  1505 02/04/19  1252   SODIUM mmol/L 136  137 136 133*  < > 134*  < >  --   --    POTASSIUM mmol/L 3 6  3 7 3 7 3 7  < > 3 8  < >  --   --    CHLORIDE mmol/L 98*  99* 97* 98*  < > 97*  < >  --   --    CO2 mmol/L 29  29 29 28  < > 28  < >  --   --    CO2, I-STAT mmol/L  --   --   --   --   --   --  38* 32   BUN mg/dL 43*  44* 46* 42*  < > 38*  < >  --   --    CREATININE mg/dL 2 07*  2 10* 2 18* 2 12*  < > 1 94*  < >  --   --    CALCIUM mg/dL 7 2*  7 0* 7 4* 7 3*  < > 7 6*  < >  --   --    ALK PHOS U/L  --   --   --   --  63  --   --   --    ALT U/L  --   --   --   --  34  --   --   --    AST U/L  --   --   --   --  211*  --   --   --    GLUCOSE, ISTAT mg/dl  --   --   --   --   --   --  183* 197*   < > = values in this interval not displayed  Results from last 7 days  Lab Units 02/06/19 0428 02/05/19 2244 02/05/19  0556   MAGNESIUM mg/dL 2 5 2 4 2 2       Results from last 7 days  Lab Units 02/06/19 0428 02/05/19  0556   PHOSPHORUS mg/dL 3 2 3 9        Results from last 7 days  Lab Units 02/06/19  0201 02/05/19  2017 02/05/19  1322  02/04/19  1814 02/04/19  1245   INR   --   --   --   --  1 19* 1 08   PTT seconds 149* 50* 82*  < > >210* 28   < > = values in this interval not displayed  Results from last 7 days  Lab Units 02/05/19  0624   LACTIC ACID mmol/L 2 7*       0  Lab Value Date/Time   TROPONINI 0 07 (H) 12/19/2017 1339   TROPONINI 0 04 (H) 01/30/2017 0056       Imaging:  IMPRESSION:  Patent aortobiiliac inflow with acute occlusion of the common femoral, superficial femoral, popliteal and tibioperoneal trunk arteries bilaterally  Bilateral peroneal arteries are reconstituted with flow to the ankle    Bilateral anterior tibial and posterior tibial arteries are intermittently occluded with very weak distal reconstitution      Left renal midpole partially exophytic soft tissue density of unclear significance  This may represent old traumatic injury though benign or malignant lesion is not excluded  Given central fat density, benign etiology is favored  Ultrasound could be  performed for further characterization  ECHO:  2/5/19: EF 45-50%, no significant regurgitation in all valves    Micro:  Lab Results   Component Value Date    URINECX No Growth <1000 cfu/mL 01/29/2017       Allergies:    Allergies   Allergen Reactions    Acetaminophen      "after I take it it makes the pain worse"    Motrin [Ibuprofen] GI Intolerance       Medications:   Scheduled Meds:  Current Facility-Administered Medications:  acetaminophen 650 mg Oral Q6H PRN Alisson Nicole MD    docusate sodium 100 mg Oral BID PRN Brissa Dumont    DULoxetine 30 mg Oral Daily Brissa Dumont    gabapentin 100 mg Oral TID Brissa Dumont    heparin (porcine) 3-30 Units/kg/hr (Order-Specific) Intravenous Titrated Brissa Dumont Last Rate: 8 Units/kg/hr (02/06/19 0426)   heparin (porcine) 4,600 Units Intravenous PRN Brissa Dumont    heparin (porcine) 9,200 Units Intravenous PRN Brissa Dumont    HYDROmorphone 0 5 mg Intravenous Q3H PRN Estefani Denise PA-C    insulin regular (HumuLIN R,NovoLIN R) infusion 0 3-21 Units/hr Intravenous Titrated Lucina العراقي MD Last Rate: 3 Units/hr (02/06/19 0436)   levothyroxine 50 mcg Oral Early Morning Estefani Denise PA-C    metoprolol tartrate 50 mg Oral Q12H [facility] Estefani Denise PA-C    multi-electrolyte 100 mL/hr Intravenous Continuous Estefani Denise PA-C Last Rate: 100 mL/hr (02/06/19 0426)   naloxone 0 04 mg Intravenous Q1MIN PRN Lucina العراقي MD    ondansetron 4 mg Intravenous Q4H PRN Dona Farrar    oxyCODONE 2 5 mg Oral Q4H PRN Estefani Denise PA-C    Or        oxyCODONE 5 mg Oral Q4H PRN Estefani Denise PA-C    QUEtiapine 50 mg Oral HS Lucina العراقي MD    senna-docusate sodium 2 tablet Oral Daily Lucina العراقي MD      Continuous Infusions:  heparin (porcine) 3-30 Units/kg/hr (Order-Specific) Last Rate: 8 Units/kg/hr (02/06/19 0426)   insulin regular (HumuLIN R,NovoLIN R) infusion 0 3-21 Units/hr Last Rate: 3 Units/hr (02/06/19 0436)   multi-electrolyte 100 mL/hr Last Rate: 100 mL/hr (02/06/19 0426)     PRN Meds:    acetaminophen 650 mg Q6H PRN   docusate sodium 100 mg BID PRN   heparin (porcine) 4,600 Units PRN   heparin (porcine) 9,200 Units PRN   HYDROmorphone 0 5 mg Q3H PRN   naloxone 0 04 mg Q1MIN PRN   ondansetron 4 mg Q4H PRN   oxyCODONE 2 5 mg Q4H PRN   Or     oxyCODONE 5 mg Q4H PRN       Invasive lines and devices: Invasive Devices     Peripheral Intravenous Line            Peripheral IV 02/04/19 Left Arm 1 day    Peripheral IV 02/04/19 Left Hand 1 day    Peripheral IV 02/04/19 Right Antecubital 1 day          Arterial Line            Arterial Line 02/04/19 Right Radial 1 day          Drain            Negative Pressure Wound Therapy (V A C ) Leg Left; Inner; Outer; Lower 1 day    Urethral Catheter Latex 16 Fr  1 day                  Assessment:  Mr Jerelyn Brittle is a 69 yo male with PMH of atrial fibrillation non-compliant with anti-coagulation, possible MCI, and hx carotid endarterectomy found to have BL arterial occlusion of the common femoral, superficial femoral, popliteal and tibioperoneal arteries, currently POD 2 for emergent BL arterial exploration with thromboembolectomy and BL 4 compartment fasciotomy      Active problems  - delirium vs  MCI  - PVD with BL occlusion of common femoral, superficial femoral, popliteal and tibioperoneal arteries  - Anemia  - Rhabdomyolysis  - SEVEN  - DM II    Plan:      Neuro:   - GCS 14: Patient is confused, only knows his first name, said "I don't know" to location and year    - it is unknown whether this is his baseline/cognitive impairment or 2/2 delirium  - Pain control: dilaudid 1mg PRN, oxy 5mg prn, gabapentin TID   - patient states he is only in pain when we move his legs   - last pain dose at 0245 am, unsure if he is very good at asking for it --> switch to scheduled dilaudid   - per nurse, very lethargic on dilaudid  - Patient reported sleeping very poorly, received Seroquel 50 last night--> switch to melatonin   - Continue home cymbalta      CV:   - Peripheral vascular disease with BL cclusion of the common femoral, superficial femoral, popliteal and tibioperoneal arteries              - POD 1  BL arterial thromboembolectomy and BL 4 compartment fasciotomy              - left wound has vac set to continuous suction;    - right wound vac removed, now packed with Kerlex, adaptic, 4x4's, ABDs and ACE wrap              - heparin gtt at 8              - likely amputation of LLE, +/- amputation RLE              - vascular surgery following              - frequent neurovascular checks   - atorvastatin held 2/2 rising liver enzymes  - MAPs: dropped to 54 with BP of 68/34   - improved 60s-70s with BP of 120s-130s/40s-50s after receiving 1 L bolus   - chronic diastolic CHF: 58/84 ECHO showed EF 40%  - atrial fibrillation: diagnosed 12/18, hx LA appendage thrombus   - currently rate controlled and in NSR, HR consistently in the 80s   - Cards following: continue oral lopressor 50mg, ordered ECHO   - ECHO 2/5/19: EF 45-50%, no significant regurgitation in all valves               - monitor on telemetry  - essential HTN: BP 120s-130s/40s-50s     Lung:   - Respiratory insufficiency              - on 2L NC              - incentive spirometry, maintain SpO2 >92%               - respiratory therapy saw him and discontinued respiratory protocol    - wean off O2     GI:   - carb controlled diet as tolerated, per nurse was able to eat yesterday  - NPO if going ahead with LLE amputations   - colace as needed     FEN:   - currently on isolyte @ 100 ml/hr  - +3L   - monitor electrolytes and replete as needed  - K 3 7, Mag 2 5, Phos 3 2, corrected Ca 8 3     :   - SEVEN 2/2 rhabdomyolysis in the setting of limb ischemia - Cr 1 85-->1 94--> 2 07 (baseline 1 59), CK >11,000              - received 2 500 ml boluses, currently on isolyte at 100/hr               - serial CK and BMPs               - monitor I/Os: currently -125cc/hr (1/kg/hr)   - gallardo catheter  - Stage 4 CKD with baseline ~Cr 1 59-2 0, baseline BUN 30s-40s     ID:   - WBC trending down: 19 87--> 16 51  - spiked fever 101 2 last night  - continue to monitor for signs of infection and sepsis     Heme:   - WBC elevated to 16 51, afebrile  - Hgb dropped from 16 4--> 13 8--> 11 3-->10-->8-->7 8    - patient drowsy, but could be secondary to dilaudid   - H/H q8h    minimal blood loss during surgery   - blood lost from R wound yesterday (uncharted but per vascular resident was enough to soak Dr Stephanie Clay)      - consider transfusing a unit if it drops further  - Platelets trending down: 154-->167-->130-->120, continue to monitor     Endo:   - hypothyroidism: placed on synthroid in 12/18              - restarted on home synthroid 50 mcg qd AM on empty stomach  - DM type II, poorly controlled               - currently on insulin gtt with q2 BG checks               - BG ranging from 114-116, goal is 140-180       - continue on insulin if plan to go to OR, otherwise switch to basal-bolus regimen   - draw A1c level   - home regimen per notes: lantus 50 units qhs, novolog TID with meals                       Msk/Skin:   - lumbosacral degenerative disc disease  - reposition, monitor for ulcers  - PT/OT consulted: will see patient when medically stable     Disposition:   - continue ICU care  - possible LLE amputation      Code Status: Level 1 - Full Code       Naya Pulley

## 2019-02-06 NOTE — PROGRESS NOTES
Vascular    Progress Note - Jeovany Patino 1940, 70 y o  male MRN: [id number]    Unit/Bed#:  -01 Encounter: [id number]    Primary Care Provider: Nathan Saleem   Date and time admitted to hospital: 2/4/2019  2:39 PM        Ischemia of both lower extremities   Assessment & Plan    Coplay IIb acute ischemia in the setting of left atrial thrombus and patient stopping to take anticoagulation  Patient presenting with several days of decreased/no motor function and no distal signals    OR 2/4 b/l femoral embolectomy w/ b/l lower limb fasciotomies  Cont heparin gtt  - lifelong anticoagulation per cardiology  Cont VAC to left lower leg to low continuous 125  High likelihood that the patient will need at least an amputation to left leg - will continue to follow  Right leg may prove salvageable  Type 2 diabetes mellitus with stage 4 chronic kidney disease ([facility] [address] )   Assessment & Plan    Care per ICU  Insulin gtt     Atrial fibrillation with rapid ventricular response ([email or web address] )   Assessment & Plan    Cont lopressor   Currently rate controlled  Cardiology is following     * SEVEN (acute kidney injury) ([email or web address] [facility] )   Assessment & Plan    Creatine continues to rise to 2 1 today from 1 5 at admission  Continue to trend urine output  Daily labs         Subjective/Objective   Subjective:   No events overnight  Objective:    Blood pressure (!) 68/34, pulse 84, temperature 98 9 °F (37 2 °C), temperature source Oral, resp  rate (!) 25, height 5' 10" (1 778 m), weight 120 kg (264 lb 8 8 oz), SpO2 100 %  ,Body mass index is 37 96 kg/m²        Intake/Output Summary (Last 24 hours) at 02/06/19 0629  Last data filed at 02/06/19 0557   Gross per 24 hour   Intake          4385 84 ml   Output             1220 ml   Net          3165 84 ml       Invasive Devices     Peripheral Intravenous Line            Peripheral IV 02/04/19 Left Arm 1 day    Peripheral IV 02/04/19 Left Hand 1 day    Peripheral IV 02/04/19 Right Antecubital 1 day          Arterial Line            Arterial Line 02/04/19 Right Radial 1 day          Drain            Negative Pressure Wound Therapy (V A C ) Leg Left; Inner; Outer; Lower 1 day    Urethral Catheter Latex 16 Fr  1 day                Physical Exam:     Gen: NAD, AAOx3  CV: RRR  Pulm: no resp distress  Abd: Soft, non-distended, non-tender, incisions c/d/i  No dopplerable signals in feet  R foot pink  L mottled  No motor  VAC to left leg  R wrapped  Lab, Imaging and other studies:  I have personally reviewed pertinent lab results    , CMP:   Lab Results   Component Value Date    SODIUM 137 02/06/2019    SODIUM 136 02/06/2019    K 3 7 02/06/2019    K 3 6 02/06/2019    CL 99 (L) 02/06/2019    CL 98 (L) 02/06/2019    CO2 29 02/06/2019    CO2 29 02/06/2019    BUN 44 (H) 02/06/2019    BUN 43 (H) 02/06/2019    CREATININE 2 10 (H) 02/06/2019    CREATININE 2 07 (H) 02/06/2019    CALCIUM 7 0 (L) 02/06/2019    CALCIUM 7 2 (L) 02/06/2019    EGFR 30 02/06/2019    EGFR 30 02/06/2019   , Coagulation: No results found for: PT, INR, APTT  VTE Pharmacologic Prophylaxis: Heparin  VTE Mechanical Prophylaxis: None

## 2019-02-06 NOTE — SOCIAL WORK
Pt sleeping  CM met with pt brother, Elizabeth Gonzales, and Olivia Roe at bedside and introduced self and role with dcp  Pt resides with his sister, Murtaza Anderson, brother Jake Pittman, and Martha's son  Pt home is 2 stories with 8 JAYLEEN through the front  Pt has a flight of stairs to the basement where is bedroom is  Pt has half bathroom in the basement and full bathroom on first floor  PTA pt independent with ADLs (sponge bathing) and ambulation PTA  Pt does not drive and family transports him  Pt has no hx of VNA, STR, MH, or drug/alcohol rehab  Pharmacy is with the [place] in [place]  Pt reports his sister, Murtaza Anderson, is his POA  Elizabeth Gonzales will have her bring paperwork  CM sent e-mail to the insurance verification team to inform them pt is a [place]  CM to follow  CM reviewed d/c planning process including the following: identifying help at home, patient preference for d/c planning needs, Discharge Lounge, [facility] Meds to Bed program, availability of treatment team to discuss questions or concerns patient and/or family may have regarding understanding medications and recognizing signs and symptoms once discharged  CM also encouraged patient to follow up with all recommended appointments after discharge  Patient advised of importance for patient and family to participate in managing patients medical well being

## 2019-02-06 NOTE — ASSESSMENT & PLAN NOTE
Creatine continues to rise to 2 1 today from 1 5 at admission  Continue to trend urine output  Daily labs

## 2019-02-06 NOTE — PROGRESS NOTES
Progress Note - Critical Care   Zaki Carballor 70 y o  male MRN: [id number]  Unit/Bed#: [address] -01 Encounter: [id number]    Attending Physician: Deon Ramírez MD      ______________________________________________________________________  Assessment and Plan:   Principal Problem:    SEVEN (acute kidney injury) ([facility] [address] )  Active Problems:    Essential hypertension    Hyperlipidemia    Atrial fibrillation with rapid ventricular response ([facility] )    Type 2 diabetes mellitus with stage 4 chronic kidney disease ([facility] )    Hypothyroidism    Ischemia of both lower extremities    Bilateral occlusive lower extremity thrombus  Resolved Problems:    * No resolved hospital problems  *        Neuro:   - hypoactive delirium: GCS 15 when fully aroused, oriented to person, place, and time  Continue seroquel, regulate sleep-wake cycles  [facility]  - analgesia: on home med cymbalta  Has used minimal pain medications over the last 24 hours  PRN oxycodone/tylenol  Scheduled gabapentin  CV:   - atrial fibrillation noncompliant with Eliquis: currently on heparin gtt, goal PTT 60-90, holding Eliquis  Appreciate cardiology recs, will continue metoprolol BID  Needs lifelong anticoagulation especially in light of LA appendage thrombus  Echo yesterday demonstrated EF 45-50% stable from 12/2017   - CHF: monitor for volume overload, judicious fluids  - HTN: holding home lisinopril given SEVEN  - HLD: continue statin  - BLE critical limb ischemia: s/p B CFAE with thrombectomy, b/l 4 compartment fasciotomy  VAC in place on L, dressing in place on R  Will likely need LLE amputation, psb R also  Pulm:   - no acute issues, currently on nasal cannula O2, will wean as appropriate for sat>92%  Needs aggressive pulm toilet and IS  GI:   - senokot S for bowel regimen    :   - SEVEN on CKD: Cr seems to have plateaued at 2 1, up from baseline of 1 5  Will continue IVF resuscitation, unfortunately may be his new baseline    - continue Dong for accurate I/O's, UOP marginal    F/E/N:   - continue isolyte @ 100  - replete lytes for K>4, phos>3, Mg>2  - continue CCD1 diet    ID:   - leukocytosis: stable at 16 however now spiking fevers  Concern for beginning stages of sepsis given ischemia of BLE  Will trend WBC  Tylenol PRN for fevers  Heme:   - ABLA: hemoglobin continuing to drift down, this AM 7 8 from yesterday's 10  Will continue to monitor VAC/R leg ACE for signs of bleeding  Transfuse<7  Continue heparin gtt  - thrombocytopenia: trend daily    Endo:   - DM2: switch to subcutaneous insulin now that he is tolerating diet  Monitor blood sugars QID   - hypothyroidism: continue home levothyroxine     Msk/Skin:   - rhabdomyolysis: continue IVF, trend q6 CK with BMP  - DDD: continue home cymbalta, prn analgesia    Disposition:   - continue ICU level of care    Code Status: Level 1 - Full Code    Counseling / Coordination of Care  Total Critical Care time spent 35 minutes excluding procedures, teaching and family updates  ______________________________________________________________________    Chief Complaint: pain L>R leg    24 Hour Events: one episode of hypotension overnight, got 1L bolus with improvement of VS    Review of Systems   Constitutional: Positive for fever  Negative for chills  HENT: Negative  Eyes: Negative  Respiratory: Negative  Negative for cough, shortness of breath and wheezing  Cardiovascular: Negative  Gastrointestinal: Negative  Negative for abdominal pain, constipation, diarrhea, nausea and vomiting  Endocrine: Negative  Genitourinary: Negative  Musculoskeletal:        BLE pain L>R   Skin: Negative  Allergic/Immunologic: Negative  Neurological: Negative  Hematological: Negative      Psychiatric/Behavioral: Negative       ______________________________________________________________________    Physical Exam:   NAD, somnolent but arousable, oriented x3  Normocephalic, atraumatic  MMM, EOMI, PERRLA  Mild tachypnea on 2LNC  RRR  Abd soft, NT/ND  Bilateral groin incisions cdi closed with staples, some mild ecchymosis surrounding each  No motor or sensation in distal LE  Dopp venous signal on R foot, no arterial signal  Absent signals on L foot, L leg dressing cdi wrapped with ace  R foot pink, L foot cool and mottled/pale  L vac in place with serosang fluid in canister  CN grossly intact  -rash/lesions      ______________________________________________________________________  Vitals:    19 0300 19 0400 19 0500 19 0555   BP:       BP Location:       Pulse: 82 80 82 84   Resp: (!) 23 (!) 24 (!) 25 (!) 25   Temp:  98 9 °F (37 2 °C)     TempSrc:  Oral     SpO2: 95% 97% 97% 100%   Weight:       Height:           Temperature:   Temp (24hrs), Av 5 °F (37 5 °C), Min:98 °F (36 7 °C), Max:101 3 °F (38 5 °C)    Current Temperature: 98 9 °F (37 2 °C)  Weights:   IBW: 73 kg    Body mass index is 37 96 kg/m²  Weight (last 2 days)     Date/Time   Weight    19 1925  120 (264 55)            Hemodynamic Monitoring:  N/A     Non-Invasive/Invasive Ventilation Settings:  Respiratory    Lab Data (Last 4 hours)    None         O2/Vent Data (Last 4 hours)    None              No results found for: PHART, EQF7OGK, PO2ART, QEA7IJK, Q4KRAHRJ, BEART, SOURCE  SpO2: SpO2: 100 %  Intake and Outputs:  I/O       701 -  0700 701 -  07 -  0700    P  O   200     I V  (mL/kg) 5001 7 (41 7) 4185 8 (34 9)     IV Piggyback 350      Total Intake(mL/kg) 5351 7 (44 6) 4385 8 (36 5)     Urine (mL/kg/hr) 1225 1120 (0 4)     Drains 350 100     Blood 400      Total Output  1220      Net +3376 7 +3165 8                 UOP: 0 4cc/kg/hr   Nutrition:        Diet Orders            Start     Ordered    19  Diet Shahbaz/CHO Controlled; Consistent Carbohydrate Diet Level 1 (4 carb servings/60 grams CHO/meal)  Diet effective now     Question Answer Comment   Diet Type Shahbaz/CHO Controlled    Shahbaz/CHO Controlled Consistent Carbohydrate Diet Level 1 (4 carb servings/60 grams CHO/meal)    RD to adjust diet per protocol?  No        02/04/19 1912          Labs:     Results from last 7 days  Lab Units 02/06/19  0429 02/05/19  2350 02/05/19  1322 02/05/19  0556 02/04/19  1814 02/04/19  1505 02/04/19  1252 02/04/19  1245   WBC Thousand/uL 16 51* 16 58*  --  19 87* 13 52*  --   --  12 82*   HEMOGLOBIN g/dL 7 8* 8 0* 10 0* 11 3* 13 8  --   --  16 4   I STAT HEMOGLOBIN g/dl  --   --   --   --   --  16 0 17 7*  --    HEMATOCRIT % 24 1* 24 6*  --  34 8* 41 7  --   --  48 6   HEMATOCRIT, ISTAT %  --   --   --   --   --  47 52*  --    PLATELETS Thousands/uL 120* 131*  --  167 154  --   --  177   NEUTROS PCT % 83* 81*  --  84*  --   --   --  71   MONOS PCT % 7 8  --  9  --   --   --  9       Results from last 7 days  Lab Units 02/06/19  0428 02/05/19  2244 02/05/19  1757 02/05/19  1219 02/05/19  0556 02/04/19  2358 02/04/19  1814   SODIUM mmol/L 136  137 136 133* 134* 134* 134* 135*   POTASSIUM mmol/L 3 6  3 7 3 7 3 7 4 2 3 8 3 7 3 2*   CHLORIDE mmol/L 98*  99* 97* 98* 98* 97* 97* 96*   CO2 mmol/L 29  29 29 28 28 28 27 25   ANION GAP mmol/L 9  9 10 7 8 9 10 14*   BUN mg/dL 43*  44* 46* 42* 38* 38* 35* 31*   CREATININE mg/dL 2 07*  2 10* 2 18* 2 12* 1 97* 1 94* 1 85* 1 54*   CALCIUM mg/dL 7 2*  7 0* 7 4* 7 3* 7 2* 7 6* 8 1* 8 5   ALT U/L  --   --   --   --  34  --   --    AST U/L  --   --   --   --  211*  --   --    ALK PHOS U/L  --   --   --   --  63  --   --    ALBUMIN g/dL  --   --   --   --  2 6*  --   --    TOTAL BILIRUBIN mg/dL  --   --   --   --  0 78  --   --        Results from last 7 days  Lab Units 02/06/19  0428 02/05/19  2244 02/05/19  0556 02/04/19  2358   MAGNESIUM mg/dL 2 5 2 4 2 2 2 3   PHOSPHORUS mg/dL 3 2  --  3 9  --         Results from last 7 days  Lab Units 02/06/19  0201 02/05/19 2017 02/05/19  1322 02/05/19  0605 02/04/19  2358 02/04/19  1922 02/04/19  1814 02/04/19  1245 INR   --   --   --   --   --   --  1 19* 1 08   PTT seconds 149* 50* 82* 113* 160* >210* >210* 28           Results from last 7 days  Lab Units 02/05/19  0624 02/04/19  2358   LACTIC ACID mmol/L 2 7* 3 2*     ABG:  Lab Results   Component Value Date    PHART 7 424 02/04/2019    LYN9DEA 37 4 02/04/2019    PO2ART 82 4 02/04/2019    EOJ2GQT 23 9 02/04/2019    BEART -0 2 02/04/2019    SOURCE Line, Arterial 02/04/2019     VBG:  Results from last 7 days  Lab Units 02/04/19  2358   ABG SOURCE  Line, Arterial         No results found for: [facility]   Imaging: Cta Abdominal W Run Off W Wo Contrast    Result Date: 2/4/2019  Impression: Patent aortobiiliac inflow with acute occlusion of the common femoral, superficial femoral, popliteal and tibioperoneal trunk arteries bilaterally  Bilateral peroneal arteries are reconstituted with flow to the ankle  Bilateral anterior tibial and posterior tibial arteries are intermittently occluded with very weak distal reconstitution  Left renal midpole partially exophytic soft tissue density of unclear significance  This may represent old traumatic injury though benign or malignant lesion is not excluded  Given central fat density, benign etiology is favored  Ultrasound could be performed for further characterization  Initial vascular findings were communicated with Dr Sidra Conner of the [facility] ED  Per him, the patient had already been transported  Per him, Dr Sonal Alex of vascular surgery was aware with planned emergent operation  Workstation performed: [id number]    I have personally reviewed pertinent reports  Allergies:    Allergies   Allergen Reactions    Acetaminophen      "after I take it it makes the pain worse"    Motrin [Ibuprofen] GI Intolerance     Medications:   Scheduled Meds:  Current Facility-Administered Medications:  acetaminophen 650 mg Oral Q6H PRN Kavita Smith MD    calcium gluconate 2 g Intravenous Once Kavita Smith MD    docusate sodium 100 mg Oral BID PRN Larallison Fletcher    DULoxetine 30 mg Oral Daily Brissa Dumont    gabapentin 100 mg Oral TID Brissa Dumont    heparin (porcine) 3-30 Units/kg/hr (Order-Specific) Intravenous Titrated Larence Fletcher Last Rate: 8 Units/kg/hr (02/06/19 0426)   heparin (porcine) 4,600 Units Intravenous PRN Brissa Dumont    heparin (porcine) 9,200 Units Intravenous PRN Brissa Dumont    HYDROmorphone 0 5 mg Intravenous Q3H PRN Gary Huntley PA-C    insulin regular (HumuLIN R,NovoLIN R) infusion 0 3-21 Units/hr Intravenous Titrated Christelle Rudolph MD Last Rate: 3 Units/hr (02/06/19 0436)   levothyroxine 50 mcg Oral Early Morning Gary Huntley PA-C    metoprolol tartrate 50 mg Oral Q12H [facility] Gary Huntley PA-C    multi-electrolyte 100 mL/hr Intravenous Continuous Gary Huntley PA-C Last Rate: 100 mL/hr (02/06/19 0426)   naloxone 0 04 mg Intravenous Q1MIN PRN Christelle Rudolph MD    ondansetron 4 mg Intravenous Q4H PRN Shahab Fletcher    oxyCODONE 2 5 mg Oral Q4H PRN Gary Huntley PA-C    Or        oxyCODONE 5 mg Oral Q4H PRN Gary Huntley PA-C    potassium chloride 20 mEq Intravenous Q2H Otto Montes MD    QUEtiapine 50 mg Oral HS Christelle Rudolph MD    senna-docusate sodium 2 tablet Oral Daily Christelle Rudolph MD      Continuous Infusions:  heparin (porcine) 3-30 Units/kg/hr (Order-Specific) Last Rate: 8 Units/kg/hr (02/06/19 0426)   insulin regular (HumuLIN R,NovoLIN R) infusion 0 3-21 Units/hr Last Rate: 3 Units/hr (02/06/19 0436)   multi-electrolyte 100 mL/hr Last Rate: 100 mL/hr (02/06/19 0426)     PRN Meds:    acetaminophen 650 mg Q6H PRN   docusate sodium 100 mg BID PRN   heparin (porcine) 4,600 Units PRN   heparin (porcine) 9,200 Units PRN   HYDROmorphone 0 5 mg Q3H PRN   naloxone 0 04 mg Q1MIN PRN   ondansetron 4 mg Q4H PRN   oxyCODONE 2 5 mg Q4H PRN   Or     oxyCODONE 5 mg Q4H PRN     VTE Pharmacologic Prophylaxis: Heparin Drip  VTE Mechanical Prophylaxis: sequential compression device  Invasive lines and devices:   Invasive Devices     Peripheral Intravenous Line            Peripheral IV 02/04/19 Left Arm 1 day    Peripheral IV 02/04/19 Left Hand 1 day    Peripheral IV 02/04/19 Right Antecubital 1 day          Arterial Line            Arterial Line 02/04/19 Right Radial 1 day          Drain            Negative Pressure Wound Therapy (V A C ) Leg Left; Inner; Outer; Lower 1 day    Urethral Catheter Latex 16 Fr  1 day                     Portions of the record may have been created with voice recognition software  Occasional wrong word or "sound a like" substitutions may have occurred due to the inherent limitations of voice recognition software  Read the chart carefully and recognize, using context, where substitutions have occurred      Kavita Smith MD

## 2019-02-06 NOTE — PROGRESS NOTES
Repeat PTT 82, no change made  Remains at 900unit/kg/hr  Will recheck this evening  BMP drawn as ordered  Creatinine elevated

## 2019-02-07 NOTE — PROGRESS NOTES
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 and bilateral lower extremity ischemia now POD# 3 s/p bilateral external iliac endarterectomy as well as prophylactic fasciotomies  Neuro:   - hypoactive delirium: GCS 15 when fully aroused, oriented to person, place, and time  Continue seroquel, regulate sleep-wake cycles  [facility] ICU  - analgesia: on home med cymbalta  Scheduled gabapentin  Required PRN oxy x2 last night and also dilaudid PRN x1     CV:   - atrial fibrillation noncompliant with Eliquis: PTT pending this morning; currently on heparin gtt, goal PTT 60-90, holding Eliquis  Appreciate cardiology recs, will continue metoprolol BID  Needs lifelong anticoagulation especially in light of LA appendage thrombus  Echo on 2/5/19 demonstrated EF 45-50% stable from 12/2017   - CHF: monitor for volume overload, judicious fluids  - HTN: holding home lisinopril given SEVEN  - HLD: continue statin  - BLE critical limb ischemia: s/p B CFAE with thrombectomy, b/l 4 compartment fasciotomy  Wound VAC stickers placed on right and left wounds on 2/6/19  Will likely need LLE amputation, psb R also  Pulm:   - no acute issues, currently on room air  - Needs aggressive pulm toilet and IS  GI:   - senokot S for bowel regimen    :   - SEVEN on CKD: Cr slightly improved this morning 1 68  Will continue IVF resuscitation  - Total in: 3320, total out: 1790; net: +1530cc  - 0 62 ml/kg/hr over the last 24 hours  - continue Dong for accurate I/O's    F/E/N:   - continue isolyte @ 100  - replete lytes for K>4, phos>3, Mg>2  -  Mag 2 7 and phos 3 5 this AM  - continue CCD1 diet    ID:   - Febrile to 101 2 at 0500 this morning  - leukocytosis:increased to 22 26 this morning from 16 51    Concern for beginning stages of sepsis given ischemia of BLE  Will continue to trend WBC  Tylenol PRN for fevers  Heme:   - ABLA: hemoglobin 7 2, seems to be stabilizing  Will continue to monitor VAC/R leg ACE for signs of bleeding  Transfuse<7  Continue heparin gtt  - thrombocytopenia: 136k this morning, trend daily    Endo:   - DM2: switch to subcutaneous insulin now that he is tolerating diet  Monitor blood sugars QID  - Sugars have ranged from 115-167 over the last 24h   - HgbA1c pending this AM  - hypothyroidism: continue home levothyroxine     Msk/Skin:   - rhabdomyolysis: Total CK slightly increased this morning at 11,550, previously 10,852 yesterday  continue IVF, trend q6 CK with BMP    Disposition:   - continue ICU level of care    Code Status: Level 1 - Full Code    Counseling / Coordination of Care  Total Critical Care time spent 35 minutes excluding procedures, teaching and family updates  ______________________________________________________________________    Chief Complaint: Pain in lower extremities     24 Hour Events: Blood pressures stable overnight, however patient now has a temperature of 101 2F  Patient is oriented x3 but is slow to answer questions  Unclear what baseline cognitive status is  Review of Systems   Constitutional: Positive for fever  Negative for chills  HENT: Negative  Eyes: Negative  Respiratory: Negative  Negative for cough, shortness of breath and wheezing  Cardiovascular: Negative  Gastrointestinal: Negative  Negative for abdominal pain, constipation, diarrhea, nausea and vomiting  Endocrine: Negative  Genitourinary: Negative  Musculoskeletal:        Bilateral lower leg extremity   Skin: Negative  Allergic/Immunologic: Negative  Neurological: Negative  Hematological: Negative      Psychiatric/Behavioral: Negative       ______________________________________________________________________    Physical Exam:   NAD, somnolent but arousable, oriented x3  Normocephalic, atraumatic  MMM, EOMI, PERRLA  CTAB, not currently on oxygen  RRR  Abd soft, NT/ND  Bilateral groin incisions cdi closed with staples, some mild ecchymosis surrounding each  No motor or sensation in distal LE  Dopp venous signal on R foot, no arterial signal; Absent signals on L foot  R foot pink, L foot cool and mottled/pale  There is an odor coming from L foot, per nursing getting worse   Bilateral vac in place with no new fluid in container overnight    ______________________________________________________________________  Vitals:    19 0200 19 0300 19 0400 19 0500   BP:       BP Location:       Pulse: 80 78 82 82   Resp: (!) 23 21 (!) 30 20   Temp:    (!) 101 2 °F (38 4 °C)   TempSrc:    Rectal   SpO2: 100% 100% 99% 100%   Weight:       Height:           Temperature:   Temp (24hrs), Av 5 °F (37 5 °C), Min:98 7 °F (37 1 °C), Max:101 2 °F (38 4 °C)    Current Temperature: (!) 101 2 °F (38 4 °C) (ccs resident notified)  Weights:   IBW: 73 kg    Body mass index is 37 96 kg/m²  Weight (last 2 days)     None        Hemodynamic Monitoring:  N/A     Non-Invasive/Invasive Ventilation Settings:  Respiratory    Lab Data (Last 4 hours)    None         O2/Vent Data (Last 4 hours)    None              No results found for: PHART, RZZ4IEO, PO2ART, ZJX9JPO, W8WYYVNA, BEART, SOURCE  SpO2: SpO2: 100 %  Intake and Outputs:  I/O       701 -  0700 701 -  07 07 -  0700    P  O   200     I V  (mL/kg) 5001 7 (41 7) 4185 8 (34 9)     IV Piggyback 350      Total Intake(mL/kg) 5351 7 (44 6) 4385 8 (36 5)     Urine (mL/kg/hr) 1225 1120 (0 4)     Drains 350 100     Blood 400      Total Output  1220      Net +3376 7 +3165 8                 UOP: 0 4cc/kg/hr   Nutrition:        Diet Orders            Start     Ordered    19  Diet Shahbaz/CHO Controlled; Consistent Carbohydrate Diet Level 1 (4 carb servings/60 grams CHO/meal)  Diet effective now     Question Answer Comment   Diet Type Shahbaz/CHO Controlled    Shahbaz/CHO Controlled Consistent Carbohydrate Diet Level 1 (4 carb servings/60 grams CHO/meal)    RD to adjust diet per protocol? No        02/04/19 1912          Labs:     Results from last 7 days  Lab Units 02/07/19  0549 02/06/19  1230 02/06/19  0429 02/05/19  2350 02/05/19  1322 02/05/19  0556 02/04/19  1814 02/04/19  1505  02/04/19  1245   WBC Thousand/uL 22 26*  --  16 51* 16 58*  --  19 87* 13 52*  --   --  12 82*   HEMOGLOBIN g/dL 7 2* 8 0* 7 8* 8 0* 10 0* 11 3* 13 8  --   --  16 4   I STAT HEMOGLOBIN g/dl  --   --   --   --   --   --   --  16 0  < >  --    HEMATOCRIT % 22 9* 24 4* 24 1* 24 6*  --  34 8* 41 7  --   --  48 6   HEMATOCRIT, ISTAT %  --   --   --   --   --   --   --  47  < >  --    PLATELETS Thousands/uL 136*  --  120* 131*  --  167 154  --   --  177   NEUTROS PCT % 84*  --  83* 81*  --  84*  --   --   --  71   MONOS PCT % 8  --  7 8  --  9  --   --   --  9   < > = values in this interval not displayed      Results from last 7 days  Lab Units 02/07/19  0549 02/07/19  0006 02/06/19  1703 02/06/19  1230 02/06/19  0428 02/05/19  2244 02/05/19  1757  02/05/19  0556   SODIUM mmol/L 135* 136 136 138 136  137 136 133*  < > 134*   POTASSIUM mmol/L 3 8 3 9 3 6 3 6 3 6  3 7 3 7 3 7  < > 3 8   CHLORIDE mmol/L 100 100 101 103 98*  99* 97* 98*  < > 97*   CO2 mmol/L 28 29 27 26 29  29 29 28  < > 28   ANION GAP mmol/L 7 7 8 9 9  9 10 7  < > 9   BUN mg/dL 46* 44* 41* 39* 43*  44* 46* 42*  < > 38*   CREATININE mg/dL 1 68* 1 77* 1 77* 1 76* 2 07*  2 10* 2 18* 2 12*  < > 1 94*   CALCIUM mg/dL 7 8* 7 6* 7 1* 7 1* 7 2*  7 0* 7 4* 7 3*  < > 7 6*   ALT U/L  --   --   --   --   --   --   --   --  34   AST U/L  --   --   --   --   --   --   --   --  211*   ALK PHOS U/L  --   --   --   --   --   --   --   --  63   ALBUMIN g/dL  --   --   --   --   --   --   --   --  2 6*   TOTAL BILIRUBIN mg/dL  --   --   --   --   --   --   --   --  0 78   < > = values in this interval not displayed  Results from last 7 days  Lab Units 02/06/19  0428 02/05/19  2244 02/05/19  0556 02/04/19  2358   MAGNESIUM mg/dL 2 5 2 4 2 2 2 3   PHOSPHORUS mg/dL 3 2  --  3 9  --         Results from last 7 days  Lab Units 02/07/19  0006 02/06/19  1703 02/06/19  1014 02/06/19  0201 02/05/19  2017 02/05/19  1322 02/05/19  0605  02/04/19  1814 02/04/19  1245   INR   --   --   --   --   --   --   --   --  1 19* 1 08   PTT seconds 47* 100* 59* 149* 50* 82* 113*  < > >210* 28   < > = values in this interval not displayed  Results from last 7 days  Lab Units 02/05/19  0624 02/04/19  2358   LACTIC ACID mmol/L 2 7* 3 2*     ABG:  Lab Results   Component Value Date    PHART 7 424 02/04/2019    CNE1SQN 37 4 02/04/2019    PO2ART 82 4 02/04/2019    IZD7ESM 23 9 02/04/2019    BEART -0 2 02/04/2019    SOURCE Line, Arterial 02/04/2019     VBG:    Results from last 7 days  Lab Units 02/04/19  2358   ABG SOURCE  Line, Arterial         No results found for: [facility]   Imaging:     Echo complete 2/5/19: LEFT VENTRICLE:  Systolic function was mildly reduced  Ejection fraction was estimated in the range of 45 % to 50 %  There was mild diffuse hypokinesis  Wall thickness was at the upper limits of normal      RIGHT VENTRICLE:  The ventricle was mildly dilated  Systolic function was normal      LEFT ATRIUM:  The atrium was mildly dilated      RIGHT ATRIUM:  The atrium was mildly dilated      TRICUSPID VALVE:  There was mild regurgitation  Cta Abdominal W Run Off W Wo Contrast    Result Date: 2/4/2019  Impression: Patent aortobiiliac inflow with acute occlusion of the common femoral, superficial femoral, popliteal and tibioperoneal trunk arteries bilaterally  Bilateral peroneal arteries are reconstituted with flow to the ankle  Bilateral anterior tibial and posterior tibial arteries are intermittently occluded with very weak distal reconstitution   Left renal midpole partially exophytic soft tissue density of unclear significance  This may represent old traumatic injury though benign or malignant lesion is not excluded  Given central fat density, benign etiology is favored  Ultrasound could be performed for further characterization  Initial vascular findings were communicated with Dr Sidra Conner of the [facility] ED  Per him, the patient had already been transported  Per him, Dr Sonal Alex of vascular surgery was aware with planned emergent operation  Workstation performed: [id number]       Allergies:    Allergies   Allergen Reactions    Acetaminophen      "after I take it it makes the pain worse"    Motrin [Ibuprofen] GI Intolerance     Medications:   Scheduled Meds:    Current Facility-Administered Medications:  acetaminophen 650 mg Oral Q6H PRN Kavita Smith MD    docusate sodium 100 mg Oral BID PRN Brissa Dumont    DULoxetine 30 mg Oral Daily Brissa Dumont    gabapentin 100 mg Oral TID Brissa Dumont    heparin (porcine) 3-30 Units/kg/hr (Order-Specific) Intravenous Titrated Brissa Dumont Last Rate: 10 Units/kg/hr (02/07/19 0112)   heparin (porcine) 4,600 Units Intravenous PRN Brissa Dumont    heparin (porcine) 9,200 Units Intravenous PRN Brissa Dumont    HYDROmorphone 0 5 mg Intravenous Q3H PRN Eulice Livings, PA-C    insulin regular (HumuLIN R,NovoLIN R) infusion 0 3-21 Units/hr Intravenous Titrated Shaila Portillo MD Last Rate: 3 Units/hr (02/06/19 2249)   levothyroxine 50 mcg Oral Early Morning Eulice Livings, PA-C    melatonin 3 mg Oral HS PRN Kavita Smith MD    metoprolol tartrate 50 mg Oral Q12H [facility] Eulice Livings, PA-C    multi-electrolyte 100 mL/hr Intravenous Continuous Eulice Livings, PA-C Last Rate: 100 mL/hr (02/06/19 2248)   naloxone 0 04 mg Intravenous Q1MIN PRN Shaila Portillo MD    ondansetron 4 mg Intravenous Q4H PRN Shelvia Cones    oxyCODONE 2 5 mg Oral Q4H PRN Eulice Livings, PA-C    Or        oxyCODONE 5 mg Oral Q4H PRN Eulice Livings, PA-C    senna-docusate sodium 2 tablet Oral Daily Shaila Portillo MD Continuous Infusions:    heparin (porcine) 3-30 Units/kg/hr (Order-Specific) Last Rate: 10 Units/kg/hr (02/07/19 0112)   insulin regular (HumuLIN R,NovoLIN R) infusion 0 3-21 Units/hr Last Rate: 3 Units/hr (02/06/19 2249)   multi-electrolyte 100 mL/hr Last Rate: 100 mL/hr (02/06/19 2248)     PRN Meds:    acetaminophen 650 mg Q6H PRN   docusate sodium 100 mg BID PRN   heparin (porcine) 4,600 Units PRN   heparin (porcine) 9,200 Units PRN   HYDROmorphone 0 5 mg Q3H PRN   melatonin 3 mg HS PRN   naloxone 0 04 mg Q1MIN PRN   ondansetron 4 mg Q4H PRN   oxyCODONE 2 5 mg Q4H PRN   Or     oxyCODONE 5 mg Q4H PRN     VTE Pharmacologic Prophylaxis: Heparin Drip  VTE Mechanical Prophylaxis: sequential compression device  Invasive lines and devices: Invasive Devices     Peripheral Intravenous Line            Peripheral IV 02/04/19 Left Arm 2 days    Peripheral IV 02/04/19 Left Hand 2 days    Peripheral IV 02/04/19 Right Antecubital 2 days          Arterial Line            Arterial Line 02/04/19 Right Radial 2 days          Drain            Negative Pressure Wound Therapy (V A C ) Leg Left; Inner; Outer; Lower 2 days    Urethral Catheter Latex 16 Fr  2 days    Negative Pressure Wound Therapy (V A C ) Leg Right; Inner less than 1 day                   Tae Cain MD, PGY-1  2/7/2019  7:15 AM

## 2019-02-07 NOTE — PROGRESS NOTES
Vascular    Progress Note - Mago Andre 1940, 70 y o  male MRN: [id number]    Unit/Bed#:  -01 Encounter: [id number]    Primary Care Provider: Yolanda Lebron   Date and time admitted to hospital: 2/4/2019  2:39 PM        Ischemia of both lower extremities   Assessment & Plan    [place] IIb acute ischemia in the setting of left atrial thrombus and patient stopping to take anticoagulation  Patient presenting with several days of decreased/no motor function and no distal signals    OR 2/4 b/l femoral embolectomy w/ b/l lower limb fasciotomies  Cont heparin gtt  - lifelong anticoagulation per cardiology  Cont VAC to b/l lower extremities  L BKA possibly AKA on Friday  Will continue to evaluate RLE     Type 2 diabetes mellitus with stage 4 chronic kidney disease ([facility] [address] )   Assessment & Plan    Care per ICU  Insulin gtt     [facility]   Assessment & Plan    On lopressor   Currently rate controlled  Cardiology is following     * SEVEN (acute kidney injury) ([facility] [address] )   Assessment & Plan    Peak at 2 12  Downtrending to 1 77 at midnight  Continue to trend urine output  Daily labs       Subjective/Objective   Subjective:   Mental status the same  Not able to communicate well  Objective:     Blood pressure 122/50, pulse 82, temperature (!) 101 2 °F (38 4 °C), temperature source Rectal, resp  rate 20, height 5' 10" (1 778 m), weight 120 kg (264 lb 8 8 oz), SpO2 100 %  ,Body mass index is 37 96 kg/m²        Intake/Output Summary (Last 24 hours) at 02/07/19 0624  Last data filed at 02/07/19 0401   Gross per 24 hour   Intake          3319 73 ml   Output             1540 ml   Net          1779 73 ml       Invasive Devices     Peripheral Intravenous Line            Peripheral IV 02/04/19 Left Arm 2 days    Peripheral IV 02/04/19 Left Hand 2 days    Peripheral IV 02/04/19 Right Antecubital 2 days          Arterial Line            Arterial Line 02/04/19 Right Radial 2 days          Drain            Negative Pressure Wound Therapy (V A C ) Leg Left; Inner; Outer; Lower 2 days    Urethral Catheter Latex 16 Fr  2 days    Negative Pressure Wound Therapy (V A C ) Leg Right; Inner less than 1 day                Physical Exam:     Gen: NAD  Neuro: AAOx3  CV: RRR  Pulm: no resp distress  Abd: Soft, non-distended, non-tender  VAC to b/l LE fasciotomy sites  R doppl AT monophasic  No motor or sensation b/l LE    Lab, Imaging and other studies:  I have personally reviewed pertinent lab results    , CBC:   Lab Results   Component Value Date    HGB 8 0 (L) 02/06/2019    HCT 24 4 (L) 02/06/2019   , CMP:   Lab Results   Component Value Date    SODIUM 136 02/07/2019    K 3 9 02/07/2019     02/07/2019    CO2 29 02/07/2019    BUN 44 (H) 02/07/2019    CREATININE 1 77 (H) 02/07/2019    CALCIUM 7 6 (L) 02/07/2019    EGFR 36 02/07/2019     VTE Pharmacologic Prophylaxis: Heparin  VTE Mechanical Prophylaxis: sequential compression device

## 2019-02-07 NOTE — PROGRESS NOTES
Wallet requested by sister, Natalie Robbins  Pt is agreeable  Wallet delivered by security after confirmation of POA

## 2019-02-07 NOTE — NUTRITION
Recommend Glucerna supplement BID (chocolate) with L and D  Request RD Diet Protocol please  Monitor renal parameters  Adjust Mg  Check Ionized Ca, Vit B12 and Folate

## 2019-02-07 NOTE — ASSESSMENT & PLAN NOTE
[place] IIb acute ischemia in the setting of left atrial thrombus and patient stopping to take anticoagulation  Patient presenting with several days of decreased/no motor function and no distal signals    OR 2/4 b/l femoral embolectomy w/ b/l lower limb fasciotomies  Cont heparin gtt  - lifelong anticoagulation per cardiology  Cont VAC to b/l lower extremities  L BKA possibly AKA on Friday  Will continue to evaluate RLE

## 2019-02-08 NOTE — PROGRESS NOTES
Vascular Surgery    Progress Note - Cande Chatman 1940, 70 y o  male MRN: [id number]    Unit/Bed#: [facility] 517-01 Encounter: [id number]    Primary Care Provider: Vicente Espinoza   Date and time admitted to hospital: 2/4/2019  2:39 PM    Ischemia of both lower extremities   Assessment & Plan    [place] IIb acute ischemia in the setting of left atrial thrombus and patient noncompliant with Eliquis therapy  Patient presenting with several days of decreased/no motor function and no distal signals    S/P B/L femoral embolectomy w/ b/l lower limb fasciotomies, VAC placement 2/4    Plan:  --Cont heparin gtt  - lifelong anticoagulation per cardiology  --Cont VAC to b/l lower extremities  --To OR today for LLE amputation and RLE wound washout/VAC change       Atrial fibrillation with rapid ventricular response (HCC)   Assessment & Plan    Afib w/ RVR, known atrial thrombus, BLE thromboembolism, noncompliant with anticoagulation    Plan:  --Continue Heparin gtt  --Rate controlled- Lopressor  --Cardiology following         * SEVEN (acute kidney injury) ([facility] [address] )   Assessment & Plan    Improving   Creatinine 1 55    Plan:  --Monitor         Subjective:  Pt lethargic, c/o pain in legs    Vitals:  /81   Pulse 78   Temp (!) 100 8 °F (38 2 °C)   Resp 19   Ht 5' 10" (1 778 m)   Wt 120 kg (264 lb 8 8 oz)   SpO2 99%   BMI 37 96 kg/m²     I/Os:  I/O last 3 completed shifts: In: 4473 7 [P O :320; I V :4153 7]  Out: 2825 [Urine:2675; Drains:150]  No intake/output data recorded      Lab Results and Cultures:   Lab Results   Component Value Date    WBC 25 13 (H) 02/08/2019    HGB 7 0 (L) 02/08/2019    HCT 21 6 (L) 02/08/2019     (H) 02/08/2019     02/08/2019     Lab Results   Component Value Date    GLUCOSE 183 (H) 02/04/2019    CALCIUM 7 5 (L) 02/08/2019     01/02/2015    K 3 7 02/08/2019    CO2 29 02/08/2019     02/08/2019    BUN 46 (H) 02/08/2019    CREATININE 1 55 (H) 02/08/2019     Lab Results   Component Value Date    INR 1 19 (H) 02/04/2019    INR 1 08 02/04/2019    INR 1 04 12/19/2017    PROTIME 15 2 (H) 02/04/2019    PROTIME 13 4 02/04/2019    PROTIME 13 4 12/19/2017        Blood Culture: No results found for: BLOODCX,   Urinalysis:   Lab Results   Component Value Date    COLORU Yellow 02/05/2019    CLARITYU Turbid 02/05/2019    SPECGRAV 1 034 (H) 02/05/2019    PHUR 5 0 02/05/2019    LEUKOCYTESUR Trace (A) 02/05/2019    NITRITE Negative 02/05/2019    GLUCOSEU 250 (1/4%) (A) 02/05/2019    KETONESU Negative 02/05/2019    BILIRUBINUR Negative 02/05/2019    BLOODU Large (A) 02/05/2019   ,   Urine Culture:   Lab Results   Component Value Date    URINECX No Growth <1000 cfu/mL 01/29/2017   ,   Wound Culure: No results found for: WOUNDCULT    Medications:  Current Facility-Administered Medications   Medication Dose Route Frequency    acetaminophen (TYLENOL) tablet 650 mg  650 mg Oral Q6H PRN    docusate sodium (COLACE) capsule 100 mg  100 mg Oral BID PRN    DULoxetine (CYMBALTA) delayed release capsule 30 mg  30 mg Oral Daily    gabapentin (NEURONTIN) capsule 100 mg  100 mg Oral TID    heparin (porcine) 25,000 units in 250 mL infusion (premix)  3-30 Units/kg/hr (Order-Specific) Intravenous Titrated    heparin (porcine) injection 4,600 Units  4,600 Units Intravenous PRN    heparin (porcine) injection 9,200 Units  9,200 Units Intravenous PRN    HYDROmorphone (DILAUDID) injection 0 5 mg  0 5 mg Intravenous Q3H PRN    insulin regular (HumuLIN R,NovoLIN R) 1 Units/mL in sodium chloride 0 9 % 100 mL infusion  0 3-21 Units/hr Intravenous Titrated    levothyroxine tablet 50 mcg  50 mcg Oral Early Morning    melatonin tablet 6 mg  6 mg Oral HS    metoprolol tartrate (LOPRESSOR) tablet 50 mg  50 mg Oral Q12H [address]    multi-electrolyte (ISOLYTE-S PH 7 4 equivalent) IV solution  100 mL/hr Intravenous Continuous    naloxone (NARCAN) 0 04 mg/mL syringe 0 04 mg  0 04 mg Intravenous Q1MIN PRN    ondansetron (ZOFRAN) injection 4 mg  4 mg Intravenous Q4H PRN    oxyCODONE (ROXICODONE) IR tablet 2 5 mg  2 5 mg Oral Q4H PRN    Or    oxyCODONE (ROXICODONE) IR tablet 5 mg  5 mg Oral Q4H PRN    potassium chloride 20 mEq IVPB (premix)  20 mEq Intravenous Once    senna-docusate sodium (SENOKOT S) 8 6-50 mg per tablet 2 tablet  2 tablet Oral Daily       Physical Exam:    General appearance: alert and oriented, in no acute distress  Lungs: clear to auscultation bilaterally  Heart: regular rate and rhythm, S1, S2 normal, no murmur, click, rub or gallop  Abdomen: soft, non-tender; bowel sounds normal; no masses,  no organomegaly  Extremities: B/L calves edematous, TTP, feet warm, decreased M/S     Wound/Incision:  B/L LE wound VAC's in place functioning     Pulse exam:  AT: Right: doppler signal/monophasic Left: 0  PT: Right: 0 Left: 0      Milton Pickard PA-C  2/8/2019

## 2019-02-08 NOTE — PROGRESS NOTES
Progress Note - Cardiology   Stepan Burkett 70 y o  male MRN: [id number]  Unit/Bed#: [facility] 517-01 Encounter: [id number]    Assessment:  Principal Problem:    SEVEN (acute kidney injury) ([address] )  Active Problems:    Essential hypertension    Hyperlipidemia    Atrial fibrillation with rapid ventricular response (HCC)    Type 2 diabetes mellitus with stage 4 chronic kidney disease (HCC)    Hypothyroidism    Ischemia of both lower extremities    Bilateral occlusive lower extremity thrombus    1  Persistent AF, rate controlled  2  HFrEF 40-45%, compensated  3  Critical limb ischemia   -acute occlusion of CFA, SFA, popliteal, TP trunk bilaterally s/p bilateral femoral embolectomy, fasciotomies  4  Hx of ANGELA thrombus  5  SEVEN on CKD, improving  6  HTN  7  DM    Plan:  1  Continue heparin gtt, metoprolol 50mg BID (goal to switch to Toprol before discharge)  2  Patient will need to be started on ACEi/ARB/ARNI when clinically stable, SEVEN resolves  3  L BKA per vascular surgery planned today  4  Consideration for ischemic work up to be made as OP  Subjective/Objective     Subjective: Febrile overnight, WBC increasing  Endorses bilateral leg pain  Denies dizziness, palpitations, CP, dyspnea, orthopnea  Plan for L BKA per vascular        Objective:  Vitals: /81   Pulse 76   Temp 100 4 °F (38 °C)   Resp 17   Ht 5' 10" (1 778 m)   Wt 120 kg (264 lb 8 8 oz)   SpO2 98%   BMI 37 96 kg/m²   Vitals:    02/04/19 1925   Weight: 120 kg (264 lb 8 8 oz)     Orthostatic Blood Pressures      Most Recent Value   Blood Pressure  152/81 filed at 02/07/2019 2134   Patient Position - Orthostatic VS  Lying filed at 02/06/2019 0200            Intake/Output Summary (Last 24 hours) at 02/08/19 0836  Last data filed at 02/08/19 0600   Gross per 24 hour   Intake          3139 14 ml   Output             1780 ml   Net          1359 14 ml       Physical Exam:   General appearance: alert, +ve distress/in pain  Head: Normocephalic, without obvious abnormality, atraumatic  Neck: no JVD and supple  Lungs: clear to auscultation bilaterally  Normal air entry  Normal effort  No rales, wheezing  Heart: irregularly irregular  2/6 systolic murmur  No gallop  No rub    Abdomen: soft, non-tender; no masses,  no organomegaly  Extremities: extremities normal, atraumatic, no cyanosis, 1-2+ edema  Skin: bilateral wound vacs, LLE cooler than RLE, +ve cyanosis, pulses non palpable    Medications:    Current Facility-Administered Medications:     acetaminophen (TYLENOL) tablet 650 mg, 650 mg, Oral, Q6H PRN, Luis Carlos Thorpe MD, 650 mg at 02/05/19 2249    docusate sodium (COLACE) capsule 100 mg, 100 mg, Oral, BID PRN, Waleska Patel    DULoxetine (CYMBALTA) delayed release capsule 30 mg, 30 mg, Oral, Daily, Brissa Dumont, 30 mg at 02/07/19 0813    gabapentin (NEURONTIN) capsule 100 mg, 100 mg, Oral, TID, Brissa Dumont, 100 mg at 02/08/19 0832    heparin (porcine) 25,000 units in 250 mL infusion (premix), 3-30 Units/kg/hr (Order-Specific), Intravenous, Titrated, Waleska Patel, Last Rate: 12 7 mL/hr at 02/08/19 0525, 11 Units/kg/hr at 02/08/19 0525    heparin (porcine) injection 4,600 Units, 4,600 Units, Intravenous, PRN, Waleska Patel, 4,600 Units at 02/08/19 0528    heparin (porcine) injection 9,200 Units, 9,200 Units, Intravenous, PRN, Waleska Patel    HYDROmorphone (DILAUDID) injection 0 5 mg, 0 5 mg, Intravenous, Q3H PRN, Sherlean Gowers, PA-C, 0 5 mg at 02/07/19 0620    insulin regular (HumuLIN R,NovoLIN R) 1 Units/mL in sodium chloride 0 9 % 100 mL infusion, 0 3-21 Units/hr, Intravenous, Titrated, Rose Mary Govea MD, Last Rate: 4 mL/hr at 02/08/19 0828, 4 Units/hr at 02/08/19 7065    levothyroxine tablet 50 mcg, 50 mcg, Oral, Early Morning, Sherlean Gowers, PA-C, 50 mcg at 02/07/19 0531    melatonin tablet 6 mg, 6 mg, Oral, HS, Rose Mary Govea MD, 6 mg at 02/07/19 2144    metoprolol tartrate (LOPRESSOR) tablet 50 mg, 50 mg, Oral, Q12H [address], Sherlean Gowers, PA-C, 50 mg at 02/08/19 3113    multi-electrolyte (ISOLYTE-S PH 7 4 equivalent) IV solution, 100 mL/hr, Intravenous, Continuous, Gavin Bearden PA-C, Last Rate: 100 mL/hr at 02/08/19 0535, 100 mL/hr at 02/08/19 0535    naloxone (NARCAN) 0 04 mg/mL syringe 0 04 mg, 0 04 mg, Intravenous, Q1MIN PRN, Rudi Tee MD    [facility]) injection 4 mg, 4 mg, Intravenous, Q4H PRN, Leidy Ronde    oxyCODONE (ROXICODONE) IR tablet 2 5 mg, 2 5 mg, Oral, Q4H PRN, 2 5 mg at 02/07/19 0024 **OR** oxyCODONE (ROXICODONE) IR tablet 5 mg, 5 mg, Oral, Q4H PRN, Gavin Bearden PA-C, 5 mg at 02/08/19 0558    potassium chloride 20 mEq IVPB (premix), 20 mEq, Intravenous, Once, Schuyler Andrews MD, Last Rate: 50 mL/hr at 02/08/19 0833, 20 mEq at 02/08/19 6984    senna-docusate sodium (SENOKOT S) 8 6-50 mg per tablet 2 tablet, 2 tablet, Oral, Daily, Rudi Tee MD, 2 tablet at 02/07/19 5334    Lab Results:    Results from last 7 days  Lab Units 02/08/19  0403 02/07/19  0549 02/07/19  0006   CK TOTAL U/L 10,491* 11,550* 10,852*   CK MB INDEX % <1 0 <1 0 <1 0       Results from last 7 days  Lab Units 02/08/19  0403 02/07/19  0549 02/06/19  1230 02/06/19  0429   WBC Thousand/uL 25 13* 22 26*  --  16 51*   HEMOGLOBIN g/dL 7 0* 7 2* 8 0* 7 8*   HEMATOCRIT % 21 6* 22 9* 24 4* 24 1*   PLATELETS Thousands/uL 177 136*  --  120*           Results from last 7 days  Lab Units 02/08/19  0403 02/07/19  0549 02/07/19  0006  02/05/19  0556  02/04/19  1505 02/04/19  1252   POTASSIUM mmol/L 3 7 3 8 3 9  < > 3 8  < >  --   --    CHLORIDE mmol/L 102 100 100  < > 97*  < >  --   --    CO2 mmol/L 29 28 29  < > 28  < >  --   --    CO2, I-STAT mmol/L  --   --   --   --   --   --  38* 32   BUN mg/dL 46* 46* 44*  < > 38*  < >  --   --    CREATININE mg/dL 1 55* 1 68* 1 77*  < > 1 94*  < >  --   --    CALCIUM mg/dL 7 5* 7 8* 7 6*  < > 7 6*  < >  --   --    ALK PHOS U/L  --   --   --   --  63  --   --   --    ALT U/L  --   --   --   --  34  --   --   --    AST U/L  --   --   --   --  211* --   --   --    GLUCOSE, ISTAT mg/dl  --   --   --   --   --   --  183* 197*   < > = values in this interval not displayed  Results from last 7 days  Lab Units 02/08/19  0403 02/07/19  1957 02/07/19  1323  02/04/19  1814 02/04/19  1245   INR   --   --   --   --  1 19* 1 08   PTT seconds 53* 123* 59*  < > >210* 28   < > = values in this interval not displayed  Results from last 7 days  Lab Units 02/07/19  0549 02/06/19  0428 02/05/19  2244   MAGNESIUM mg/dL 2 7* 2 5 2 4       Telemetry: Personally reviewed  Imaging: Personally reviewed  EKG: Personally reviewed       Magy Stephens MD  Cardiology Fellow

## 2019-02-08 NOTE — ASSESSMENT & PLAN NOTE
[place] acute ischemia in the setting of left atrial thrombus and patient noncompliant with Eliquis therapy  Patient presenting with several days of decreased/no motor function and no distal signals    S/P B/L femoral embolectomy w/ b/l lower limb fasciotomies, VAC placement 2/4    Plan:  --Cont heparin gtt  - lifelong anticoagulation per cardiology  --Cont VAC to b/l lower extremities  --To OR today for LLE amputation and RLE wound washout/VAC change

## 2019-02-08 NOTE — UTILIZATION REVIEW
Continued Stay Review    Date:  2/8/2019       Vital Signs: /81   Pulse 76   Temp 99 3 °F (37 4 °C)   Resp 19   Ht 5' 10" (1 778 m)   Wt 120 kg (264 lb 8 8 oz)   SpO2 98%   BMI 37 96 kg/m²       Darryl Rios is a 71 y/o male with h/o of atrial fibrillation, non-compliant on Eliquis found to have bilateral occlusions of the external iliac arteries and bilateral lower extremity ischemia now POD# 4 s/p bilateral external iliac endarterectomy as well as prophylactic fasciotomies       Plan for L ABOVE KNEE AMPUTATION  today 2/8  Postponed til tomorrow pending neuropsych evaluation for patient capacity/competency to make decisions  Per patient's sister Goyo Talley often is noncompliant with treatmentbut was "completely lucid" yesterday per sister  This AM,  Pt confused,  unable to get Gilberto Arnold to interact with me  If patient deemed to not have capacity will obtain consent from sister who is POA  Neuro:   - hypoactive delirium: GCS 15 when fully aroused, oriented to person, place, and time  On melatonin 6mg qhs, regulate sleep-wake cycles  [facility] ICU  - analgesia: on home med cymbalta  Scheduled gabapentin  Required PRN oxy x3 overnight      CV:   - atrial fibrillation noncompliant with Eliquis: PTT this morning 53; currently on heparin gtt, goal PTT 60-90, holding Eliquis   - HTN: holding home lisinopril given SEVEN    - BLE critical limb ischemia: s/p B CFAE with thrombectomy, b/l 4 compartment fasciotomy  Wound VAC stickers placed on right and left wounds on 2/6/19  Will likely need LLE amputation, psb R also       :   - SEVEN on CKD: Cr slightly improved this morning 1 55  Will continue IVF resuscitation  - Total in: 3139, total out: 1855; net: +1284cc     F/E/N:   - continue isolyte @ 100  -  K+ 3 7 this AM  - continue CCD1 diet     ID:   - Febrile overnight, Tmax 100 8 this morning at 0500  - leukocytosis:increased to 25 13 this morning from 22 26    Concern for beginning stages of sepsis given ischemia of BLE  Will continue to trend WBC  Tylenol PRN for fevers  - F/u blood cultures collected 2/7; if patient is bacteremic, will plan to start abx, but no plan for empiric abx at this time     Heme:   - ABLA: hemoglobin 7 0, seems to be stabilizing  Will continue to monitor VAC/R leg ACE for signs of bleeding  Transfuse<7  Continue heparin gtt  - thrombocytopenia: 177k this morning, trend daily     Endo:   - DM2: patient on insulin drip, algorithm #4  Monitor blood sugars QID                              Msk/Skin:   - rhabdomyolysis: Total CK stable at [address] this am    - continue IVF  - Will reassess CK and BMP daily     Disposition:  continue ICU level of care     Scheduled Meds:   Current Facility-Administered Medications:  acetaminophen 650 mg Oral Q6H PRN Aime Cortes MD    docusate sodium 100 mg Oral BID PRN Brissa Dumont    DULoxetine 30 mg Oral Daily Brissa Dumont    gabapentin 100 mg Oral TID Brissa Dumont    heparin (porcine) 3-30 Units/kg/hr (Order-Specific) Intravenous Titrated Brissa Dumont Last Rate: 11 Units/kg/hr (02/08/19 1145)   heparin (porcine) 4,600 Units Intravenous PRN Brissa Dumont    heparin (porcine) 9,200 Units Intravenous PRN Brissa Dumont    HYDROmorphone 0 5 mg Intravenous Q3H PRN Kimberlee Flatness, PA-C    insulin regular (HumuLIN R,NovoLIN R) infusion 0 3-21 Units/hr Intravenous Titrated Nazanin Carlson MD Last Rate: 4 Units/hr (02/08/19 1881)   levothyroxine 50 mcg Oral Early Morning Kimberlee Flatness, PA-C    melatonin 6 mg Oral HS Nazanin Carlson MD    metoprolol tartrate 50 mg Oral Q12H [address] Kimberlee Flatness, PA-C    multi-electrolyte 100 mL/hr Intravenous Continuous Kimberlee Flatness, PA-C Last Rate: 100 mL/hr (02/08/19 8835)   naloxone 0 04 mg Intravenous Q1MIN PRN Nazanin Carlson MD    ondansetron 4 mg Intravenous Q4H PRN Sheng Kohler    oxyCODONE 2 5 mg Oral Q4H PRN Kimberlee Flatness, PA-C    Or        oxyCODONE 5 mg Oral Q4H PRN Kimberlee Flatness, PA-C    senna-docusate sodium 2 tablet Oral Daily Nazanin Carlson MD Continuous Infusions:   heparin (porcine) 3-30 Units/kg/hr (Order-Specific) Last Rate: 11 Units/kg/hr (02/08/19 5088)   insulin regular (HumuLIN R,NovoLIN R) infusion 0 3-21 Units/hr Last Rate: 4 Units/hr (02/08/19 8517)   multi-electrolyte 100 mL/hr Last Rate: 100 mL/hr (02/08/19 5890)      Discharge Plan:  tbd

## 2019-02-08 NOTE — PROGRESS NOTES
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 and bilateral lower extremity ischemia now POD# 4 s/p bilateral external iliac endarterectomy as well as prophylactic fasciotomies  Neuro:   - hypoactive delirium: GCS 15 when fully aroused, oriented to person, place, and time  On melatonin 6mg qhs, regulate sleep-wake cycles  [facility] ICU  - analgesia: on home med cymbalta  Scheduled gabapentin  Required PRN oxy x3 overnight     CV:   - atrial fibrillation noncompliant with Eliquis: PTT this morning 53; currently on heparin gtt, goal PTT 60-90, holding Eliquis  Appreciate cardiology recs, will continue metoprolol BID  Needs lifelong anticoagulation especially in light of LA appendage thrombus  Echo on 2/5/19 demonstrated EF 45-50% stable from 12/2017   - CHF: monitor for volume overload, judicious fluids  - HTN: holding home lisinopril given SEVEN  - HLD: continue statin  - BLE critical limb ischemia: s/p B CFAE with thrombectomy, b/l 4 compartment fasciotomy  Wound VAC stickers placed on right and left wounds on 2/6/19  Will likely need LLE amputation, psb R also  Pulm:   - no acute issues, currently on nasal cannula   - Needs aggressive pulm toilet and IS    - Goal SpO2 > 92%    GI:   - senokot S for bowel regimen    :   - SEVEN on CKD: Cr slightly improved this morning 1 55  Will continue IVF resuscitation  - Total in: 3139, total out: 1855; net: +1284cc  - 0 64 ml/kg/hr over the last 24 hours  - continue Dong for accurate I/O's    F/E/N:   - continue isolyte @ 100  - replete lytes for K>4, phos>3, Mg>2  -  K+ 3 7 this AM  - continue CCD1 diet    ID:   - Febrile overnight, Tmax 100 8 this morning at 0500  - leukocytosis:increased to 25 13 this morning from 22 26    Concern for beginning stages of sepsis given ischemia of BLE  Will continue to trend WBC  Tylenol PRN for fevers  - F/u blood cultures collected 2/7; if patient is bacteremic, will plan to start abx, but no plan for empiric abx at this time  - 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    Heme:   - ABLA: hemoglobin 7 0, seems to be stabilizing  Will continue to monitor VAC/R leg ACE for signs of bleeding  Transfuse<7  Continue heparin gtt  - thrombocytopenia: 177k this morning, trend daily    Endo:   - DM2: patient on insulin drip, algorithm #4  Monitor blood sugars QID  - Sugars have ranged from  over the last 12h   - HgbA1c 11 1  - hypothyroidism: continue home levothyroxine     Msk/Skin:   - rhabdomyolysis: Total CK stable at [address]   - continue IVF  - Will reassess CK and BMP daily    Disposition:   - continue ICU level of care    Code Status: Level 1 - Full Code    Counseling / Coordination of Care  Total Critical Care time spent 35 minutes excluding procedures, teaching and family updates  ______________________________________________________________________    Chief Complaint: Pain in lower extremities     24 Hour Events: Blood pressures stable overnight, febrile at 0500 to 100 8 max  Patient responded to 2 of 3 orientation questions and is oriented to person and time  Continues to be reluctant to answer questions  Review of Systems   Constitutional: Positive for fever  Negative for chills  HENT: Negative  Eyes: Negative  Respiratory: Negative  Negative for cough, shortness of breath and wheezing  Cardiovascular: Negative  Gastrointestinal: Negative  Negative for abdominal pain, constipation, diarrhea, nausea and vomiting  Endocrine: Negative  Genitourinary: Negative  Musculoskeletal:        Bilateral lower leg extremity   Skin: Negative  Allergic/Immunologic: Negative  Neurological: Negative  Hematological: Negative  Psychiatric/Behavioral: Negative  ______________________________________________________________________    Physical Exam:   NAD, somnolent but arousable  Normocephalic, atraumatic  MMM, EOMI, PERRLA  CTAB  RRR  Abd soft, NT/ND  Bilateral groin incisions cdi closed with staples, some mild ecchymosis surrounding each  No motor or sensation in distal LE  Dopp venous signal on R foot, no arterial signal; Absent signals on L foot  R foot pink and warm, L foot cool and mottled/pale  There is an odor coming from L foot, per nursing getting worse   Bilateral vac in place with no new fluid in container overnight    ______________________________________________________________________  Vitals:    19 0300 19 0400 19 0500 19 0600   BP:       BP Location:       Pulse: 78 78 76 78   Resp: (!) 29 (!) 38 21 19   Temp: 100 4 °F (38 °C) 100 4 °F (38 °C) (!) 100 8 °F (38 2 °C) (!) 100 8 °F (38 2 °C)   TempSrc:       SpO2: 98% 96% 99% 99%   Weight:       Height:           Temperature:   Temp (24hrs), Av 1 °F (37 8 °C), Min:99 3 °F (37 4 °C), Max:100 8 °F (38 2 °C)    Current Temperature: (!) 100 8 °F (38 2 °C)  Weights:   IBW: 73 kg    Body mass index is 37 96 kg/m²  Weight (last 2 days)     None        Hemodynamic Monitoring:  N/A     Non-Invasive/Invasive Ventilation Settings:  Respiratory    Lab Data (Last 4 hours)    None         O2/Vent Data (Last 4 hours)    None              No results found for: PHART, VIF0WJD, PO2ART, QXL1NWM, Z1FUKHMC, BEART, SOURCE  SpO2: SpO2: 99 %  Intake and Outputs:  I/O       701 -  07 -  07 -  07    P  O   200     I V  (mL/kg) 5001 7 (41 7) 4185 8 (34 9)     IV Piggyback 350      Total Intake(mL/kg) 5351 7 (44 6) 4385 8 (36 5)     Urine (mL/kg/hr) 1225 1120 (0 4)     Drains 350 100     Blood 400      Total Output 1975 1220      Net +3376 7 +3165 8                 UOP: 0 4cc/kg/hr   Nutrition:        Diet Orders            Start     Ordered    19 0001  Diet NPO; Sips with meds  Diet effective midnight     Question Answer Comment   Diet Type NPO    NPO Except: Sips with meds        02/07/19 1053          Labs:     Results from last 7 days  Lab Units 02/08/19  0403 02/07/19  0549 02/06/19  1230 02/06/19  0429 02/05/19  2350 02/05/19  1322 02/05/19  0556 02/04/19  1814  02/04/19  1245   WBC Thousand/uL 25 13* 22 26*  --  16 51* 16 58*  --  19 87* 13 52*  --  12 82*   HEMOGLOBIN g/dL 7 0* 7 2* 8 0* 7 8* 8 0* 10 0* 11 3* 13 8  --  16 4   I STAT HEMOGLOBIN   --   --   --   --   --   --   --   --   < >  --    HEMATOCRIT % 21 6* 22 9* 24 4* 24 1* 24 6*  --  34 8* 41 7  --  48 6   HEMATOCRIT, ISTAT   --   --   --   --   --   --   --   --   < >  --    PLATELETS Thousands/uL 177 136*  --  120* 131*  --  167 154  --  177   NEUTROS PCT %  --  84*  --  83* 81*  --  84*  --   --  71   MONOS PCT %  --  8  --  7 8  --  9  --   --  9   < > = values in this interval not displayed  Results from last 7 days  Lab Units 02/08/19  0403 02/07/19  0549 02/07/19  0006 02/06/19  1703 02/06/19  1230 02/06/19  0428 02/05/19  2244  02/05/19  0556   SODIUM mmol/L 137 135* 136 136 138 136  137 136  < > 134*   POTASSIUM mmol/L 3 7 3 8 3 9 3 6 3 6 3 6  3 7 3 7  < > 3 8   CHLORIDE mmol/L 102 100 100 101 103 98*  99* 97*  < > 97*   CO2 mmol/L 29 28 29 27 26 29  29 29  < > 28   ANION GAP mmol/L 6 7 7 8 9 9  9 10  < > 9   BUN mg/dL 46* 46* 44* 41* 39* 43*  44* 46*  < > 38*   CREATININE mg/dL 1 55* 1 68* 1 77* 1 77* 1 76* 2 07*  2 10* 2 18*  < > 1 94*   CALCIUM mg/dL 7 5* 7 8* 7 6* 7 1* 7 1* 7 2*  7 0* 7 4*  < > 7 6*   ALT U/L  --   --   --   --   --   --   --   --  34   AST U/L  --   --   --   --   --   --   --   --  211*   ALK PHOS U/L  --   --   --   --   --   --   --   --  63   ALBUMIN g/dL  --   --   --   --   --   --   --   --  2 6*   TOTAL BILIRUBIN mg/dL  --   --   --   --   --   --   --   --  0 78   < > = values in this interval not displayed      Results from last 7 days  Lab Units 02/07/19  0549 02/06/19  0428 02/05/19  2244 02/05/19  0556 02/04/19  2358   MAGNESIUM mg/dL 2 7* 2 5 2 4 2 2 2 3   PHOSPHORUS mg/dL 3 5 3 2  --  3 9  --         Results from last 7 days  Lab Units 02/08/19  0403 02/07/19  1957 02/07/19  1323 02/07/19  0655 02/07/19  0006 02/06/19  1703 02/06/19  1014  02/04/19  1814 02/04/19  1245   INR   --   --   --   --   --   --   --   --  1 19* 1 08   PTT seconds 53* 123* 59* 79* 47* 100* 59*  < > >210* 28   < > = values in this interval not displayed  Results from last 7 days  Lab Units 02/05/19  0624 02/04/19  2358   LACTIC ACID mmol/L 2 7* 3 2*     ABG:  Lab Results   Component Value Date    PHART 7 424 02/04/2019    ARZ8XKF 37 4 02/04/2019    PO2ART 82 4 02/04/2019    FJG7DUW 23 9 02/04/2019    BEART -0 2 02/04/2019    SOURCE Line, Arterial 02/04/2019     VBG:    Results from last 7 days  Lab Units 02/04/19  2358   ABG SOURCE  Line, Arterial         No results found for: [facility]   Imaging:     Echo complete 2/5/19: LEFT VENTRICLE:  Systolic function was mildly reduced  Ejection fraction was estimated in the range of 45 % to 50 %  There was mild diffuse hypokinesis  Wall thickness was at the upper limits of normal      RIGHT VENTRICLE:  The ventricle was mildly dilated  Systolic function was normal      LEFT ATRIUM:  The atrium was mildly dilated      RIGHT ATRIUM:  The atrium was mildly dilated      TRICUSPID VALVE:  There was mild regurgitation  Cta Abdominal W Run Off W Wo Contrast    Result Date: 2/4/2019  Impression: Patent aortobiiliac inflow with acute occlusion of the common femoral, superficial femoral, popliteal and tibioperoneal trunk arteries bilaterally  Bilateral peroneal arteries are reconstituted with flow to the ankle  Bilateral anterior tibial and posterior tibial arteries are intermittently occluded with very weak distal reconstitution  Left renal midpole partially exophytic soft tissue density of unclear significance    This may represent old traumatic injury though benign or malignant lesion is not excluded  Given central fat density, benign etiology is favored  Ultrasound could be performed for further characterization  Initial vascular findings were communicated with Dr Ab Márquez of the [facility] ED  Per him, the patient had already been transported  Per him, Dr Nga Chappell of vascular surgery was aware with planned emergent operation  Workstation performed: [id number]       Allergies:    Allergies   Allergen Reactions    Motrin [Ibuprofen] GI Intolerance     Medications:   Scheduled Meds:    Current Facility-Administered Medications:  acetaminophen 650 mg Oral Q6H PRN Berna Mckeon MD    docusate sodium 100 mg Oral BID PRN Brissa Dumont    DULoxetine 30 mg Oral Daily Brissa Dumont    gabapentin 100 mg Oral TID Brissa Dumont    heparin (porcine) 3-30 Units/kg/hr (Order-Specific) Intravenous Titrated Brissa Dumont Last Rate: 11 Units/kg/hr (02/08/19 0525)   heparin (porcine) 4,600 Units Intravenous PRN Brissa Dumont    heparin (porcine) 9,200 Units Intravenous PRN Brissa Dumont    HYDROmorphone 0 5 mg Intravenous Q3H PRN Rufina Patel PA-C    insulin regular (HumuLIN R,NovoLIN R) infusion 0 3-21 Units/hr Intravenous Titrated Sanjiv Howard MD Last Rate: 2 Units/hr (02/08/19 0557)   levothyroxine 50 mcg Oral Early Morning Rufina Patel PA-C    melatonin 6 mg Oral HS Sanjiv Howard MD    metoprolol tartrate 50 mg Oral Q12H [address] Rufina Patel PA-C    multi-electrolyte 100 mL/hr Intravenous Continuous Rufina Patel PA-C Last Rate: 100 mL/hr (02/08/19 0535)   naloxone 0 04 mg Intravenous Q1MIN PRN Sanjiv Howard MD    ondansetron 4 mg Intravenous Q4H PRN Muriel Pennington    oxyCODONE 2 5 mg Oral Q4H PRN Rufina Patel PA-C    Or        oxyCODONE 5 mg Oral Q4H PRN Rufina Patel PA-C    senna-docusate sodium 2 tablet Oral Daily Sanjiv Howard MD      Continuous Infusions:    heparin (porcine) 3-30 Units/kg/hr (Order-Specific) Last Rate: 11 Units/kg/hr (02/08/19 0525) insulin regular (HumuLIN R,NovoLIN R) infusion 0 3-21 Units/hr Last Rate: 2 Units/hr (02/08/19 0557)   multi-electrolyte 100 mL/hr Last Rate: 100 mL/hr (02/08/19 0535)     PRN Meds:    acetaminophen 650 mg Q6H PRN   docusate sodium 100 mg BID PRN   heparin (porcine) 4,600 Units PRN   heparin (porcine) 9,200 Units PRN   HYDROmorphone 0 5 mg Q3H PRN   naloxone 0 04 mg Q1MIN PRN   ondansetron 4 mg Q4H PRN   oxyCODONE 2 5 mg Q4H PRN   Or     oxyCODONE 5 mg Q4H PRN     VTE Pharmacologic Prophylaxis: Heparin Drip  VTE Mechanical Prophylaxis: sequential compression device  Invasive lines and devices: Invasive Devices     Peripheral Intravenous Line            Peripheral IV 02/04/19 Left Arm 3 days    Peripheral IV 02/04/19 Left Hand 3 days    Peripheral IV 02/07/19 Right Antecubital less than 1 day    Peripheral IV 02/07/19 Right Forearm less than 1 day          Arterial Line            Arterial Line 02/04/19 Right Radial 3 days          Drain            Negative Pressure Wound Therapy (V A C ) Leg Left; Inner; Outer; Lower 3 days    Urethral Catheter Latex 16 Fr  3 days    Negative Pressure Wound Therapy (V A C ) Leg Right; Inner 1 day                   Hoda Camacho MD, PGY-1  2/8/2019  6:42 AM

## 2019-02-08 NOTE — PROGRESS NOTES
Heparin gtt restarted at previous rate per Racquel Franklin PA-C  OR case cancelled for today- patient to have neuro-psych eval

## 2019-02-08 NOTE — ASSESSMENT & PLAN NOTE
Afib w/ RVR, known atrial thrombus, BLE thromboembolism, noncompliant with anticoagulation    Plan:  --Continue Heparin gtt  --Rate controlled- Lopressor  --Cardiology following

## 2019-02-08 NOTE — ANESTHESIA PREPROCEDURE EVALUATION
Review of Systems/Medical History    Chart reviewed  No history of anesthetic complications     Cardiovascular  Exercise tolerance (METS): >4, Exercise comment: Prior to developing B/L foot pain, pt could get up stairs without stopping though he had to go slow  Denies CP, SOB Hyperlipidemia, Hypertension , Dysrhythmias , atrial fibrillation, CHF ,   Comment: acute ischemia in the setting of left atrial thrombus      OR 2/4 b/l femoral embolectomy w/ b/l lower limb fasciotomies  Cont heparin gtt  - lifelong anticoagulation per cardiology  12/2017 Echo SUMMARY     LEFT VENTRICLE:  Systolic function was mildly to moderately reduced by visual assessment  Ejection fraction was estimated to be 40 %  There was mild diffuse hypokinesis      LEFT ATRIUM:  The atrium was mildly dilated      LEFT ATRIAL APPENDAGE:  The appendage was moderately to markedly dilated  The function was severely reduced (markedly reduced emptying velocity)  There was a small thrombus      ATRIAL SEPTUM:  No defect or patent foramen ovale was identified      MITRAL VALVE:  There was trace regurgitation      TRICUSPID VALVE:  There was trace regurgitation  ,  Pulmonary       GI/Hepatic       Kidney disease ARF, Chronic kidney disease stage 4,        Endo/Other  Diabetes poorly controlled type 2 , History of thyroid disease , hypothyroidism,   Obesity (BMI 40)    GYN       Hematology  Anemia ,     Musculoskeletal    Comment: rhabdo Arthritis     Neurology   Psychology                Anesthesia Plan  ASA Score- 4     Anesthesia Type- general with ASA Monitors  Additional Monitors:   Airway Plan:     Comment: GA with LMA/ETT, IV, antiemetics, T/C blood  H/H 7/23  Regional blocks for postop pain control    Plan Factors-    Induction- intravenous      Postoperative Plan-     Informed Consent-

## 2019-02-08 NOTE — CONSULTS
Consultation - Neuropsychology/Psychology Department  Yasir Graff 70 y o  male MRN: [id number]  Unit/Bed#: [facility] 794-11 Encounter: [id number]        Reason for Consultation:  Yasir Graff is a 70y o  year old male who was referred for a Neuropsychological Exam to assess cognitive functioning and comment on capacity to make informed medical decisions  NOTE: Purpose of the exam was explained to patient and he refused to cooperate        History of Present Illness   Physician Requesting Consult: Toya Maurice MD    PROBLEM LIST:  Patient Active Problem List   Diagnosis    Essential hypertension    Hyperlipidemia    [facility]rial fibrillation with rapid ventricular response [facility] [address] [facility]    Type 2 diabetes mellitus with stage 4 chronic kidney disease ([facility] [address] [facility]    DDD (degenerative disc disease), lumbosacral    Chronic diastolic congestive heart failure ([facility] [address] )    Hypothyroidism    Thrombus of left atrial appendage without antecedent myocardial infarction    Ischemia of both lower extremities    Bilateral occlusive lower extremity thrombus    SEVEN (acute kidney injury) ([facility] [address] )         Historical Information   Past Medical History:   Diagnosis Date    [facility]     Diabetes mellitus ([facility] [address] [facility]     Hypertension      Past Surgical History:   Procedure Laterality Date    CAROTID ENDARTERECTOMY Left     THROMBECTOMY W/ EMBOLECTOMY Bilateral 2/4/2019    Procedure: Bilateral femoral thromboembolectomy with primary closure, Bilateral 4 compartment fasciotomy;  Surgeon: Toya Maurice MD;  Location: [facility];  Service: Vascular    VAC DRESSING APPLICATION Bilateral 6/6/1849    Procedure: APPLICATION VAC DRESSING;  Surgeon: Toya Maurice MD;  Location: [facility];  Service: Vascular     Social History   History   Alcohol Use No     History   Drug Use No     History   Smoking Status    Former Smoker   Smokeless Tobacco    Never Used     Family History:   Family History   Problem Relation Age of Onset    Cancer Mother     Cancer Father        Meds/Allergies   current meds:   Current Facility-Administered Medications   Medication Dose Route Frequency    acetaminophen (TYLENOL) tablet 650 mg  650 mg Oral Q6H PRN    docusate sodium (COLACE) capsule 100 mg  100 mg Oral BID PRN    DULoxetine (CYMBALTA) delayed release capsule 30 mg  30 mg Oral Daily    gabapentin (NEURONTIN) capsule 100 mg  100 mg Oral TID    heparin (porcine) 25,000 units in 250 mL infusion (premix)  3-30 Units/kg/hr (Order-Specific) Intravenous Titrated    heparin (porcine) injection 4,600 Units  4,600 Units Intravenous PRN    heparin (porcine) injection 9,200 Units  9,200 Units Intravenous PRN    HYDROmorphone (DILAUDID) injection 0 5 mg  0 5 mg Intravenous Q3H PRN    insulin regular (HumuLIN R,NovoLIN R) 1 Units/mL in sodium chloride 0 9 % 100 mL infusion  0 3-21 Units/hr Intravenous Titrated    levothyroxine tablet 50 mcg  50 mcg Oral Early Morning    melatonin tablet 6 mg  6 mg Oral HS    metoprolol tartrate (LOPRESSOR) tablet 50 mg  50 mg Oral Q12H [address]    multi-electrolyte (ISOLYTE-S PH 7 4 equivalent) IV solution  100 mL/hr Intravenous Continuous    naloxone (NARCAN) 0 04 mg/mL syringe 0 04 mg  0 04 mg Intravenous Q1MIN PRN    ondansetron (ZOFRAN) injection 4 mg  4 mg Intravenous Q4H PRN    oxyCODONE (ROXICODONE) IR tablet 2 5 mg  2 5 mg Oral Q4H PRN    Or    oxyCODONE (ROXICODONE) IR tablet 5 mg  5 mg Oral Q4H PRN    senna-docusate sodium (SENOKOT S) 8 6-50 mg per tablet 2 tablet  2 tablet Oral Daily       Allergies   Allergen Reactions    Motrin [Ibuprofen] GI Intolerance         Family and Social Support:   No Data Recorded

## 2019-02-09 PROBLEM — R52 PAIN: Status: ACTIVE | Noted: 2019-01-01

## 2019-02-09 PROBLEM — Z71.89 GOALS OF CARE, COUNSELING/DISCUSSION: Status: ACTIVE | Noted: 2019-01-01

## 2019-02-09 NOTE — PROGRESS NOTES
Progress Note - Critical Care   Cande Chatman 70 y o  male MRN: [id number]  Unit/Bed#: [facility] 278-50 Encounter: [id number]    Assessment:     Cande Chatman is a 70 y o  male w PMH HTN, DM, CHF who is maintained in ICU w acute ischemia of bilateral lower extremities in setting of L atrial thrombus, AC noncompliance for AF       Principal Problem:    SEVEN (acute kidney injury) ([facility] [address] )  Active Problems:    Essential hypertension    Hyperlipidemia    Atrial fibrillation with rapid ventricular response (HCC)    Type 2 diabetes mellitus with stage 4 chronic kidney disease (HCC)    Hypothyroidism    Ischemia of both lower extremities    Bilateral occlusive lower extremity thrombus      Plan:          Neuro:    Analgesia - gabapentin 100mg TID (home med), oxy 2 5 Q4 PRN (0/24), oxy 5mg Q4 PRN (2/24), dilaudid 0 5mg Q3 PRN (6/26)   Acute metabolic encephalopathy - neuropsych evaluated yesterday for competency and pt did not participate in conversation, palliative consult today as pt making comments he does not want leg removed, would rather die but clinically does not have competency    Mood disorder - cymbalta    Insomnia - melatonin   Delirium precautions, sleep hygiene, daily CAM-ICU                 CV:    Bilateral lower limb ischemia - awaiting surgical management / BKA per vascular   AF w RVR - lopressor 50 Q12 w 5mg IV Q6 PRN, hep gtt                 Lung:    Acute hypoxic respiratory insufficiency - supplemental O2 PRN, wean as able, IS to prevent atelectasis                  GI:    Bowel regimen - senokot, PRN colace                  FEN:    Lytes - replete K, check Mg in setting AF w RVR    Fluid - received 1L crystalloid overnight for tachycardia, continue isolyte 50/hr, keep on low mIVF as low PO intake   Nutrition - regular diet, limited PO intake                   :    Gallardo placed - monitor I/Os   Remove gallardo today                  ID:    Leukocytosis - reactive in setting limb ischemia    Monitor clinically for s/sx developing infection                  Heme:    Ppx - hep gtt (AF)                 Endo:    DM Type 2    Hypothyroidism - levothyroxine                             Msk/Skin:    Elevated CK - 2/2 limb ischemia, trend    Repetitive repositioning and off-loading to prevent skin break down and ulcerative formation                 Disposition:    Need palliative discussion regarding goals of care, per vascular discussions w family, family members do not think pt would want amputation    Transfer out of unit as no immediate planned surgical intervention    Chief Complaint: Unable to provide on his own, denies pain if asked  Says "no" he wouldn't want leg removed when asked  Says "yes" when asked if he would rather die than have legs amputated  Cant tell me what year it is / where he is  HPI/24hr events: Vascular decided not to take pt to OR yesterday and rescheduled for today  They then decided not to take him today  He went into AF w RVR overnight w HR in 130s  Physical Exam: Physical Exam   Constitutional: He is oriented to person, place, and time  He appears well-developed and well-nourished  No distress  Calling out in pain and moaning every few minutes   Looks uncomfortable and unhappy    HENT:   Head: Normocephalic and atraumatic  Eyes: Pupils are equal, round, and reactive to light  Neck: Neck supple  No tracheal deviation present  Cardiovascular: Normal heart sounds and intact distal pulses  Exam reveals no gallop and no friction rub  No murmur heard  AF RVR w HR 120s   Pulmonary/Chest: Effort normal and breath sounds normal  No respiratory distress  He has no wheezes  He has no rales  2L NC    Abdominal: Soft  Bowel sounds are normal  He exhibits no distension and no mass  There is no tenderness  There is no guarding  Genitourinary:   Genitourinary Comments: Dong placed w clear UO   Musculoskeletal: He exhibits no edema or deformity     Bilateral LE vacs w 50cc out of each per 24hr   Neurological: He is alert and oriented to person, place, and time  Skin: Skin is warm and dry  He is not diaphoretic  Cool sensation / mottled appearance to feet, L worse than R, does have some movement still in the R toes / foot/ no movement in L and no palpable pulses    Psychiatric: He has a normal mood and affect  His behavior is normal    Nursing note and vitals reviewed  Vitals:    19 0505 19 0600 19 0605 19 0700   BP: 110/76  126/78    Pulse: (!) 114  (!) 124    Resp:     Temp:    99 8 °F (37 7 °C)   TempSrc:    Oral   SpO2: 99%  99%    Weight:  126 kg (278 lb 10 6 oz)     Height:         Arterial Line BP: 180/84  Arterial Line MAP (mmHg): 114 mmHg    Temperature:   Temp (24hrs), Av 7 °F (37 6 °C), Min:98 4 °F (36 9 °C), Max:100 4 °F (38 °C)    Current: Temperature: 99 8 °F (37 7 °C)    Weights:   IBW: 73 kg    Body mass index is 39 98 kg/m²  Weight (last 2 days)     Date/Time   Weight    19 0600  126 (278 66)              Hemodynamic Monitoring:  N/A     Non-Invasive/Invasive Ventilation Settings:  Respiratory    Lab Data (Last 4 hours)    None         O2/Vent Data (Last 4 hours)    None              No results found for: PHART, XLS9UZS, PO2ART, NPV3CCR, P7LRMDNT, BEART, SOURCE  SpO2: SpO2: 99 %    Intake and Outputs:  I/O       701 -  07 -  07    P  O  120     I V  (mL/kg) 3019 1 (25 2) 3838 5 (30 5)    IV Piggyback  200    Total Intake(mL/kg) 3139 1 (26 2) 4038 5 (32 1)    Urine (mL/kg/hr) 1705 (0 6) 2160 (0 7)    Drains 150 100    Total Output 1855 2260    Net +1284 1 +1778 5              UOP: 225/hour   Nutrition:        Diet Orders            Start     Ordered    19 0650  Diet Regular; Regular House  Diet effective midnight     Question Answer Comment   Diet Type Regular    Regular Regular House        19 0649            Labs:     Results from last 7 days  Lab Units 19  4698 02/08/19 0403 02/07/19  0549  02/06/19  0429 02/05/19  2350   WBC Thousand/uL 25 80* 25 13* 22 26*  --  16 51* 16 58*   HEMOGLOBIN g/dL 7 0* 7 0* 7 2*  < > 7 8* 8 0*   HEMATOCRIT % 22 1* 21 6* 22 9*  < > 24 1* 24 6*   PLATELETS Thousands/uL 228 177 136*  --  120* 131*   NEUTROS PCT %  --   --  84*  --  83* 81*   MONOS PCT %  --   --  8  --  7 8   < > = values in this interval not displayed  Results from last 7 days  Lab Units 02/09/19 0408 02/08/19  0403 02/07/19  0549  02/05/19  0556  02/04/19  1505 02/04/19  1252   SODIUM mmol/L 140 137 135*  < > 134*  < >  --   --    POTASSIUM mmol/L 3 1* 3 7 3 8  < > 3 8  < >  --   --    CHLORIDE mmol/L 103 102 100  < > 97*  < >  --   --    CO2 mmol/L 27 29 28  < > 28  < >  --   --    CO2, I-STAT mmol/L  --   --   --   --   --   --  38* 32   BUN mg/dL 47* 46* 46*  < > 38*  < >  --   --    CREATININE mg/dL 1 50* 1 55* 1 68*  < > 1 94*  < >  --   --    CALCIUM mg/dL 7 6* 7 5* 7 8*  < > 7 6*  < >  --   --    ALK PHOS U/L  --   --   --   --  63  --   --   --    ALT U/L  --   --   --   --  34  --   --   --    AST U/L  --   --   --   --  211*  --   --   --    GLUCOSE, ISTAT mg/dl  --   --   --   --   --   --  183* 197*   < > = values in this interval not displayed  Results from last 7 days  Lab Units 02/07/19  0549 02/06/19 0428 02/05/19  2244   MAGNESIUM mg/dL 2 7* 2 5 2 4       Results from last 7 days  Lab Units 02/07/19 0549 02/06/19 0428 02/05/19  0556   PHOSPHORUS mg/dL 3 5 3 2 3 9        Results from last 7 days  Lab Units 02/09/19  0409 02/08/19  2051 02/08/19  0403  02/04/19  1814 02/04/19  1245   INR   --   --   --   --  1 19* 1 08   PTT seconds 110* 58* 53*  < > >210* 28   < > = values in this interval not displayed      Results from last 7 days  Lab Units 02/05/19  0624   LACTIC ACID mmol/L 2 7*       0  Lab Value Date/Time   TROPONINI 0 07 (H) 12/19/2017 1339   TROPONINI 0 04 (H) 01/30/2017 0056       Imaging:   No orders to display      I have personally reviewed pertinent reports  and I have personally reviewed pertinent films in PACS    EKG: as per tele nsr no ectopy     Micro:  Lab Results   Component Value Date    BLOODCX No Growth at 24 hrs  02/07/2019    BLOODCX No Growth at 24 hrs  02/07/2019    URINECX No Growth <1000 cfu/mL 01/29/2017       Allergies:    Allergies   Allergen Reactions    Motrin [Ibuprofen] GI Intolerance       Medications:   Scheduled Meds:    Current Facility-Administered Medications:  acetaminophen 650 mg Oral Q6H PRN Luisa Benitez MD    docusate sodium 100 mg Oral BID PRN Brissa Dumont    DULoxetine 30 mg Oral Daily Brissa Dumont    gabapentin 100 mg Oral TID Brissa Dumont    heparin (porcine) 3-30 Units/kg/hr (Order-Specific) Intravenous Titrated Brissa Dumont Last Rate: 11 Units/kg/hr (02/09/19 0509)   heparin (porcine) 4,600 Units Intravenous PRN Brissa Dumont    heparin (porcine) 9,200 Units Intravenous PRN Brissa Dumont    HYDROmorphone 0 5 mg Intravenous Q3H PRN Chasidy Guzman PA-C    insulin regular (HumuLIN R,NovoLIN R) infusion 0 3-21 Units/hr Intravenous Titrated Adriana Guerrero MD Last Rate: 2 Units/hr (02/09/19 0214)   levothyroxine 50 mcg Oral Early Morning Chasidy Guzman PA-C    melatonin 6 mg Oral HS Adriana Guerrero MD    metoprolol 5 mg Intravenous Q6H PRN Rusty Cowan PA-C    metoprolol tartrate 50 mg Oral Q12H [facility] Chasidy Guzman PA-C    multi-electrolyte 50 mL/hr Intravenous Continuous Rusty Cowan PA-C Last Rate: 125 mL/hr (02/09/19 0418)   naloxone 0 04 mg Intravenous Q1MIN PRN Adriana Guerrero MD    ondansetron 4 mg Intravenous Q4H PRN Rosita Boone    oxyCODONE 2 5 mg Oral Q4H PRN Chasidy Guzman PA-C    Or        oxyCODONE 5 mg Oral Q4H PRN Chasidy Guzman PA-C    potassium chloride 20 mEq Oral Once Rusty Cowan PA-C    potassium chloride 40 mEq Oral Once Adriana Guerrero MD    potassium chloride 40 mEq Oral BID Rusty Cowan PA-C    senna-docusate sodium 2 tablet Oral Daily Adriana Corner, MD      Continuous Infusions:    heparin (porcine) 3-30 Units/kg/hr (Order-Specific) Last Rate: 11 Units/kg/hr (02/09/19 2889)   insulin regular (HumuLIN R,NovoLIN R) infusion 0 3-21 Units/hr Last Rate: 2 Units/hr (02/09/19 3249)   multi-electrolyte 50 mL/hr Last Rate: 125 mL/hr (02/09/19 5798)     PRN Meds:    acetaminophen 650 mg Q6H PRN   docusate sodium 100 mg BID PRN   heparin (porcine) 4,600 Units PRN   heparin (porcine) 9,200 Units PRN   HYDROmorphone 0 5 mg Q3H PRN   metoprolol 5 mg Q6H PRN   naloxone 0 04 mg Q1MIN PRN   ondansetron 4 mg Q4H PRN   oxyCODONE 2 5 mg Q4H PRN   Or     oxyCODONE 5 mg Q4H PRN       VTE Pharmacologic Prophylaxis: Sequential compression device (Venodyne)  and Heparin  VTE Mechanical Prophylaxis: sequential compression device    Invasive lines and devices: Invasive Devices     Peripheral Intravenous Line            Peripheral IV 02/07/19 Right Antecubital 1 day    Peripheral IV 02/07/19 Right Forearm 1 day          Arterial Line            Arterial Line 02/04/19 Right Radial 4 days          Drain            Negative Pressure Wound Therapy (V A C ) Leg Left; Inner; Outer; Lower 4 days    Urethral Catheter Latex 16 Fr  4 days    Negative Pressure Wound Therapy (V A C ) Leg Right; Inner 2 days                   Counseling / Coordination of Care  Total Critical Care time spent 35 minutes excluding procedures, teaching and family updates  Code Status: Level 1 - Full Code     Portions of the record may have been created with voice recognition software  Occasional wrong word or "sound a like" substitutions may have occurred due to the inherent limitations of voice recognition software  Read the chart carefully and recognize, using context, where substitutions have occurred       Esme Limon PA-C

## 2019-02-09 NOTE — PROGRESS NOTES
Pt HR sustaining 135  CCS Resident Dr Anastasia Butt aware  New order obtained for a 1L Isolyte bolus and to increase maintenance fluids from 100 to 125mL/Hr

## 2019-02-09 NOTE — PROGRESS NOTES
Vascular Surgery    Progress Note - Laya Kiran 1940, 70 y o  male MRN: [id number]    Unit/Bed#: [facility] 517-01 Encounter: [id number]    Primary Care Provider: Sierra Woody   Date and time admitted to hospital: 2/4/2019  2:39 PM        Ischemia of both lower extremities   Assessment & Plan    Declan IIb acute ischemia in the setting of left atrial thrombus and patient noncompliant with Eliquis therapy  Patient presenting with several days of decreased/no motor function and no distal signals    S/P B/L femoral embolectomy w/ b/l lower limb fasciotomies, VAC placement 2/4  Neuropsych consulted to weigh in regarding competence - seen by neuropsych but no documentation in note regarding competency    Plan:  --Cont heparin gtt  - lifelong anticoagulation per cardiology  --Cont VAC to b/l lower extremities  --To OR today for LLE amputation and RLE wound washout/VAC change       Type 2 diabetes mellitus with stage 4 chronic kidney disease ([facility] [address] )   Assessment & Plan    Care per ICU  Insulin gtt     Atrial fibrillation with rapid ventricular response (HCC)   Assessment & Plan    Afib w/ RVR at presentation, known atrial thrombus, BLE thromboembolism, noncompliant with anticoagulation  - Rate mostly controlled  Increased HR overnight    Plan:  --Continue Heparin gtt  --Rate controlled- Lopressor  --Cardiology following             Subjective/Objective   Subjective:   Neuropsych saw patient to determine competence regarding his decision making for amputation  Per their note he refused to cooperate - there is not a comment on capacity? Suspect refusal to cooperate is linked to his limited capacity  Increased HR overnight in setting of persistent afib  BP controlled  Objective:     Blood pressure 142/78, pulse (!) 130, temperature 100 °F (37 8 °C), resp  rate 20, height 5' 10" (1 778 m), weight 120 kg (264 lb 8 8 oz), SpO2 99 %  ,Body mass index is 37 96 kg/m²        Intake/Output Summary (Last 24 hours) at 02/09/19 0407  Last data filed at 02/09/19 0400   Gross per 24 hour   Intake          2856 28 ml   Output             1460 ml   Net          1396 28 ml       Invasive Devices     Peripheral Intravenous Line            Peripheral IV 02/07/19 Right Antecubital 1 day    Peripheral IV 02/07/19 Right Forearm 1 day          Arterial Line            Arterial Line 02/04/19 Right Radial 4 days          Drain            Negative Pressure Wound Therapy (V A C ) Leg Left; Inner; Outer; Lower 4 days    Urethral Catheter Latex 16 Fr  4 days    Negative Pressure Wound Therapy (V A C ) Leg Right; Inner 2 days                Physical Exam:     Gen: NAD, AAOx3  CV: RRR  Pulm: no resp distress  Abd: Soft, non-distended, non-tender  B/l fasciotomy VACs in place intact  R DP monophasic signal  No R PT  No doppl L DP/PT  No motor to feet  Left foot mottled  Lab, Imaging and other studies:I have personally reviewed pertinent lab results    , CBC: No results found for: WBC, HGB, HCT, MCV, PLT, ADJUSTEDWBC, MCH, MCHC, RDW, MPV, NRBC, CMP: No results found for: SODIUM, K, CL, CO2, ANIONGAP, BUN, CREATININE, GLUCOSE, CALCIUM, AST, ALT, ALKPHOS, PROT, BILITOT, EGFR  VTE Pharmacologic Prophylaxis: Heparin  VTE Mechanical Prophylaxis: sequential compression device

## 2019-02-09 NOTE — ASSESSMENT & PLAN NOTE
Bulloch IIb acute ischemia in the setting of left atrial thrombus and patient noncompliant with Eliquis therapy  Patient presenting with several days of decreased/no motor function and no distal signals    S/P B/L femoral embolectomy w/ b/l lower limb fasciotomies, VAC placement 2/4  Neuropsych consulted to weigh in regarding competence - seen by neuropsych but no documentation in note regarding competency    Plan:  --Cont heparin gtt  - lifelong anticoagulation per cardiology  --Cont VAC to b/l lower extremities  --To OR today for LLE amputation and RLE wound washout/VAC change

## 2019-02-09 NOTE — CONSULTS
Consultation - Palliative and Supportive Care   Mook Gonzalez 70 y o  male [id number]    Assessment:     Patient Active Problem List   Diagnosis    Essential hypertension    Hyperlipidemia    [facility]rial fibrillation with rapid ventricular response [facility]    Type 2 diabetes mellitus with stage 4 chronic kidney disease [facility]    DDD (degenerative disc disease), lumbosacral    Chronic diastolic congestive heart failure ([facility]    Hypothyroidism    Thrombus of left atrial appendage without antecedent myocardial infarction    Ischemia of both lower extremities    Bilateral occlusive lower extremity thrombus    SEVEN (acute kidney injury) [facility]    Goals of care, counseling/discussion    Pain       Plan:  1  Symptom management -    - Start OxyIR 2 5 mg TID  Resume oxyIR 2 5-5 mg PRN for moderate- severe pain  - Resume neurontin TID and cymbalta  - Resume bowel regimen     - Consider rechecking LFTs  2  Goals -    -Spoke with patient's sister, Karolina Campos, who he resides with and provides care for him  We discussed possible options for Sheldon and Karolina Campos feels at this time she would like him to pursue surgery  I also discussed hospice cares with her and suggested we focus on optimizing pain control today and plan to meet with Dee Hidalgo tomorrow on 2/10  Patient is not  and does not have any children  He has an adult sister and 3 adult brothers  One of his brothers suffers from mental health disorder and Karolina Campos cares for him as well  Family agreeable to meet at patient's bedside on 2/10/18 at 11AM       Code Status: Full - Level 1   Decisional apparatus:  Patient is not competent on my exam today  If competence is lost, patient's substitute decision maker would default to adult siblings by [place]     Advance Directive / Living Will / POLST:  None     I have reviewed the patient's controlled substance dispensing history in the Prescription Drug Monitoring Program in compliance with the [facility] regulations before prescribing any controlled substances  We appreciate the invitation to be involved in this patient's care  We will continue to follow patient with you  Please do not hesitate to reach our on call provider through our clinic answering service at  should you have acute symptom control concerns  IDENTIFICATION:       Inpatient consult to Palliative Care     Performed by  Uli Stuart by Gustavo Brown            Physician Requesting Consult: Elizabeth Beebe MD  Reason for Consult / Principal Problem: GOC/pain  Hx and PE limited by: Patient's cooperation  HISTORY OF PRESENT ILLNESS:       Joel Kruse is a 70 y o  male with hypertension, type 2 DM, dyslipidemia, hypothyroidism, chronic a-fib, CKD stage 4, history of non-compliance admitted with acute ischemia of bilateral lower extremities  [facility] consult has been placed to assist with goals of care as patient now requires BKA? And has expressed to multiple providers his wishes to keep his extremities "intact"  Patient is seen and examined with RN at bedside  He is slow to open his eyes to his name  He reports pain in his left inguinal canal and is able to tell me that it is intermittent in nature  He is able to tell me that he resides with his family and his sister cares for him  He also states that I should contact his sister, [place], with any questions  No further history was obtained since patient is refusing to participate by closing his eyes and  Not answering me  I spoke with patient's sister via telephone and she states, " Jamie Fischer is arrogant and non compliant  He does not trust medical teams at all " [place] was able to tell me that she would want surgery performed regardless of her brother's wishes  I did discuss hospice cares as Earle's non-compliance would ultimately result in further infections and need for further aggressive cares/procedures   I asked [place] if she would be available along with her other siblings to come in to the hospital to further discuss care for Sushma Caban and she is agreeable  In the meantime, she hopes that Sushma Caban makes a full recovery  Review of Systems   Unable to perform ROS: other (patient refuses to answer most questions  + Pain left inguinal area)       Past Medical History:   Diagnosis Date    [facility]estive [facility] failure) [facility]     Diabetes mellitus ([facility]     Hypertension      Past Surgical History:   Procedure Laterality Date    CAROTID ENDARTERECTOMY Left     THROMBECTOMY W/ EMBOLECTOMY Bilateral 2/4/2019    Procedure: Bilateral femoral thromboembolectomy with primary closure, Bilateral 4 compartment fasciotomy;  Surgeon: Xavier Tipton MD;  Location: [facility];  Service: Vascular    VAC DRESSING APPLICATION Bilateral 4/4/6098    Procedure: APPLICATION VAC DRESSING;  Surgeon: Xavier Tipton MD;  Location: [facility];  Service: Vascular     Social History     Social History    Marital status: Single     Spouse name: N/A    Number of children: N/A    Years of education: N/A     Occupational History    Not on file       Social History Main Topics    Smoking status: Former Smoker    Smokeless tobacco: Never Used    Alcohol use No    Drug use: No    Sexual activity: No     Other Topics Concern    Not on file     Social History Narrative    Pt unable to provide surgical hx at this time     Family History   Problem Relation Age of Onset    Cancer Mother     Cancer Father        MEDICATIONS / ALLERGIES:    all current active meds have been reviewed and current meds:   Current Facility-Administered Medications   Medication Dose Route Frequency    acetaminophen (TYLENOL) tablet 650 mg  650 mg Oral Q6H PRN    docusate sodium (COLACE) capsule 100 mg  100 mg Oral BID PRN    DULoxetine (CYMBALTA) delayed release capsule 30 mg  30 mg Oral Daily    gabapentin (NEURONTIN) capsule 100 mg  100 mg Oral TID    heparin (porcine) 25,000 units in 250 mL infusion (premix)  3-30 Units/kg/hr (Order-Specific) Intravenous Titrated    heparin (porcine) injection 4,600 Units  4,600 Units Intravenous PRN    heparin (porcine) injection 9,200 Units  9,200 Units Intravenous PRN    HYDROmorphone (DILAUDID) injection 0 5 mg  0 5 mg Intravenous Q3H PRN    insulin regular (HumuLIN R,NovoLIN R) 1 Units/mL in sodium chloride 0 9 % 100 mL infusion  0 3-21 Units/hr Intravenous Titrated    levothyroxine tablet 50 mcg  50 mcg Oral Early Morning    melatonin tablet 6 mg  6 mg Oral HS    metoprolol (LOPRESSOR) injection 5 mg  5 mg Intravenous Q6H PRN    metoprolol tartrate (LOPRESSOR) tablet 75 mg  75 mg Oral Q12H [facility]    multi-electrolyte (ISOLYTE-S PH 7 4 equivalent) IV solution  50 mL/hr Intravenous Continuous    naloxone (NARCAN) 0 04 mg/mL syringe 0 04 mg  0 04 mg Intravenous Q1MIN PRN    ondansetron (ZOFRAN) injection 4 mg  4 mg Intravenous Q4H PRN    oxyCODONE (ROXICODONE) IR tablet 2 5 mg  2 5 mg Oral Q4H PRN    Or    oxyCODONE (ROXICODONE) IR tablet 5 mg  5 mg Oral Q4H PRN    oxyCODONE (ROXICODONE) IR tablet 2 5 mg  2 5 mg Oral TID    potassium chloride (K-DUR,KLOR-CON) CR tablet 40 mEq  40 mEq Oral BID    senna-docusate sodium (SENOKOT S) 8 6-50 mg per tablet 2 tablet  2 tablet Oral Daily       Allergies   Allergen Reactions    Motrin [Ibuprofen] GI Intolerance       OBJECTIVE:    Physical Exam  Physical Exam   Constitutional: He appears well-developed and well-nourished  HENT:   Head: Normocephalic and atraumatic  Eyes: No scleral icterus  Neck: Neck supple  Cardiovascular: Tachycardia present  Pulmonary/Chest: Effort normal    Abdominal: Soft  Bowel sounds are normal    Musculoskeletal: He exhibits edema  + wound vac b/l LE  Neurological:   Unable to assess  Answers some questions appropriately  Skin: There is pallor  Psychiatric: His affect is labile  Nursing note and vitals reviewed  Lab Results:   I have personally reviewed pertinent labs  , CBC:   Lab Results   Component Value Date    WBC 25 80 (H) 02/09/2019    HGB 7 0 (L) 02/09/2019    HCT 22 1 (L) 02/09/2019     (H) 02/09/2019     02/09/2019    MCH 31 5 02/09/2019    MCHC 31 7 02/09/2019    RDW 14 3 02/09/2019    MPV 12 1 02/09/2019   , CMP:   Lab Results   Component Value Date    SODIUM 140 02/09/2019    K 3 1 (L) 02/09/2019     02/09/2019    CO2 27 02/09/2019    BUN 47 (H) 02/09/2019    CREATININE 1 50 (H) 02/09/2019    CALCIUM 7 6 (L) 02/09/2019    EGFR 45 02/09/2019         Counseling / Coordination of Care  Total floor / unit time spent today 45+ minutes  Greater than 50% of total time was spent with the patient and / or family counseling and / or coordination of care  A description of the counseling / coordination of care: discussion of goals of care, symptom management, psychosocial support, coordination with primary and specialist teams

## 2019-02-09 NOTE — PROGRESS NOTES
Cardiology Progress Note - Yasir Graff 70 y o  male MRN: [id number]    Unit/Bed#: [facility] 517-01 Encounter: [id number]        Subjective:    No significant events overnight  Currently in atrial fibrillation 120 bpm  Awake but clearly confused  ROS    Objective:   Vitals: Blood pressure 153/87, pulse (!) 108, temperature 99 8 °F (37 7 °C), temperature source Oral, resp  rate 18, height 5' 10" (1 778 m), weight 126 kg (278 lb 10 6 oz), SpO2 96 %  , Body mass index is 39 98 kg/m² , Orthostatic Blood Pressures      Most Recent Value   Blood Pressure  153/87 filed at 02/09/2019 4581   Patient Position - Orthostatic VS  Lying filed at 02/06/2019 6081         Systolic (03FLB), VKO:737 , Min:110 , LTF:312     Diastolic (86HAZ), ISB:24, Min:56, Max:87      Intake/Output Summary (Last 24 hours) at 02/09/19 1040  Last data filed at 02/09/19 1000   Gross per 24 hour   Intake          3868 39 ml   Output             2525 ml   Net          1343 39 ml     Weight (last 2 days)     Date/Time   Weight    02/09/19 0600  126 (278 66)                Telemetry Review: No significant arrhythmias seen on telemetry review  afib      Physical Exam   Constitutional: He is oriented to person, place, and time  No distress  HENT:   Mouth/Throat: No oropharyngeal exudate  Eyes: No scleral icterus  Neck: No JVD present  Cardiovascular: An irregularly irregular rhythm present  Tachycardia present  Pulmonary/Chest: Effort normal  No respiratory distress  He has no wheezes  He has no rales  Abdominal: Soft  Bowel sounds are normal  He exhibits no distension  There is no tenderness  There is no rebound  Musculoskeletal: He exhibits edema  Neurological: He is alert and oriented to person, place, and time  Skin: Skin is warm and dry  He is not diaphoretic           Laboratory Results:    Results from last 7 days  Lab Units 02/09/19  0408 02/08/19  0403 02/07/19  0549   CK TOTAL U/L 9,216* 10,491* 11,550*   CK MB INDEX % <1 0 <1 0 <1 0       CBC with diff:   Results from last 7 days  Lab Units 02/09/19  0408 02/08/19  0403 02/07/19  0549 02/06/19  1230 02/06/19  0429 02/05/19  2350 02/05/19  1322 02/05/19  0556 02/04/19  1814  02/04/19  1245   WBC Thousand/uL 25 80* 25 13* 22 26*  --  16 51* 16 58*  --  19 87* 13 52*  --  12 82*   HEMOGLOBIN g/dL 7 0* 7 0* 7 2* 8 0* 7 8* 8 0* 10 0* 11 3* 13 8  --  16 4   I STAT HEMOGLOBIN   --   --   --   --   --   --   --   --   --   < >  --    HEMATOCRIT % 22 1* 21 6* 22 9* 24 4* 24 1* 24 6*  --  34 8* 41 7  --  48 6   HEMATOCRIT, ISTAT   --   --   --   --   --   --   --   --   --   < >  --    MCV fL 100* 100* 100*  --  98 98  --  98 96  --  95   PLATELETS Thousands/uL 228 177 136*  --  120* 131*  --  167 154  --  177   MCH pg 31 5 32 4 31 4  --  31 7 31 9  --  31 8 31 7  --  32 0   MCHC g/dL 31 7 32 4 31 4  --  32 4 32 5  --  32 5 33 1  --  33 7   RDW % 14 3 14 3 14 1  --  14 0 13 9  --  13 8 13 5  --  13 5   MPV fL 12 1 11 8 11 6  --  10 9 11 3  --  11 8 11 1  --  11 6   NRBC AUTO /100 WBCs  --   --  0  --  0 0  --  0  --   --  0   < > = values in this interval not displayed        CMP:  Results from last 7 days  Lab Units 02/09/19  0408 02/08/19  0403 02/07/19  7353 02/07/19  0006 02/06/19  1703 02/06/19  1230 02/06/19  0428  02/05/19  0556  02/04/19  1505 02/04/19  1252   POTASSIUM mmol/L 3 1* 3 7 3 8 3 9 3 6 3 6 3 6  3 7  < > 3 8  < >  --   --    CHLORIDE mmol/L 103 102 100 100 101 103 98*  99*  < > 97*  < >  --   --    CO2 mmol/L 27 29 28 29 27 26 29  29  < > 28  < >  --   --    CO2, I-STAT mmol/L  --   --   --   --   --   --   --   --   --   --  38* 32   BUN mg/dL 47* 46* 46* 44* 41* 39* 43*  44*  < > 38*  < >  --   --    CREATININE mg/dL 1 50* 1 55* 1 68* 1 77* 1 77* 1 76* 2 07*  2 10*  < > 1 94*  < >  --   --    GLUCOSE, ISTAT mg/dl  --   --   --   --   --   --   --   --   --   --  183* 197*   CALCIUM mg/dL 7 6* 7 5* 7 8* 7 6* 7 1* 7 1* 7 2*  7 0*  < > 7 6*  < >  --   --    AST U/L  -- --   --   --   --   --   --   --  211*  --   --   --    ALT U/L  --   --   --   --   --   --   --   --  34  --   --   --    ALK PHOS U/L  --   --   --   --   --   --   --   --  63  --   --   --    EGFR ml/min/1 73sq m 45 43 39 36 36 37 30  30  < > 33  < >  --  45   < > = values in this interval not displayed  BMP:  Results from last 7 days  Lab Units 02/09/19 0408 02/08/19  0403 02/07/19  0549 02/07/19  0006 02/06/19  1703 02/06/19  1230 02/06/19  0428  02/04/19  1505   POTASSIUM mmol/L 3 1* 3 7 3 8 3 9 3 6 3 6 3 6  3 7  < >  --    CHLORIDE mmol/L 103 102 100 100 101 103 98*  99*  < >  --    CO2 mmol/L 27 29 28 29 27 26 29  29  < >  --    CO2, I-STAT mmol/L  --   --   --   --   --   --   --   --  38*   BUN mg/dL 47* 46* 46* 44* 41* 39* 43*  44*  < >  --    CREATININE mg/dL 1 50* 1 55* 1 68* 1 77* 1 77* 1 76* 2 07*  2 10*  < >  --    GLUCOSE, ISTAT mg/dl  --   --   --   --   --   --   --   --  183*   CALCIUM mg/dL 7 6* 7 5* 7 8* 7 6* 7 1* 7 1* 7 2*  7 0*  < >  --    < > = values in this interval not displayed  BNP: No results for input(s): BNP in the last 72 hours  Magnesium:   Results from last 7 days  Lab Units 02/09/19  0408 02/07/19  0549 02/06/19  0428 02/05/19  2244 02/05/19  0556 02/04/19  2358   MAGNESIUM mg/dL 2 8* 2 7* 2 5 2 4 2 2 2 3       Coags:   Results from last 7 days  Lab Units 02/09/19  0409 02/08/19  2051 02/08/19  0403 02/07/19  1957 02/07/19  1323 02/07/19  0655 02/07/19  0006  02/04/19  1814 02/04/19  1245   PTT seconds 110* 58* 53* 123* 59* 79* 47*  < > >210* 28   INR   --   --   --   --   --   --   --   --  1 19* 1 08   < > = values in this interval not displayed      TSH: No results found for: TSH    Hemoglobin A1C   Results from last 7 days  Lab Units 02/07/19  0549   HEMOGLOBIN A1C % 11 1*       Lipid Profile:       Cardiac testing:   Results for orders placed during the hospital encounter of 02/04/19   Echo complete with contrast if indicated    Narrative [facility], [address] [facility]  [phone]    Transthoracic Echocardiogram  2D, M-mode, Doppler, and Color Doppler    Study date:  2019    Patient: Maribel Reece  MR number: [id number]  Account number: [de-identified]  : 1940  Age: 70 years  Gender: Male  Status: Inpatient  Location: Bedside  Height: 70 in  Weight: 264 lb  BP: 150/ 60 mmHg    Indications: AFIB    Diagnoses: I48 0 - Atrial fibrillation    Sonographer:  FABIO Valera  Primary Physician:  Syl Tavarez DO  Referring Physician:  Irina Stevenson MD  Group:  [id number]  Interpreting Physician:  Delbert Ordoñez DO    SUMMARY    LEFT VENTRICLE:  Systolic function was mildly reduced  Ejection fraction was estimated in the range of 45 % to 50 %  There was mild diffuse hypokinesis  Wall thickness was at the upper limits of normal     RIGHT VENTRICLE:  The ventricle was mildly dilated  Systolic function was normal     LEFT ATRIUM:  The atrium was mildly dilated  RIGHT ATRIUM:  The atrium was mildly dilated  TRICUSPID VALVE:  There was mild regurgitation  HISTORY: PRIOR HISTORY: CHF, DM2, HTN, HLD, Obesity    PROCEDURE: The procedure was performed at the bedside  This was a routine study  The transthoracic approach was used  The study included complete 2D imaging, M-mode, complete spectral Doppler, and color Doppler  The heart rate was 72 bpm,  at the start of the study  Images were obtained from the parasternal, apical, subcostal, and suprasternal notch acoustic windows  Intravenous contrast ( 0 8 mL Definity in NSS) was administered to opacify the left ventricle  Echocardiographic views were limited due to lung interference  This was a technically difficult study  LEFT VENTRICLE: Size was normal  Systolic function was mildly reduced  Ejection fraction was estimated in the range of 45 % to 50 %  There was mild diffuse hypokinesis   Wall thickness was at the upper limits of normal  DOPPLER: Normal  sinus rhythm was absent  The study was not technically sufficient to allow evaluation of LV diastolic function  RIGHT VENTRICLE: The ventricle was mildly dilated  Systolic function was normal     LEFT ATRIUM: The atrium was mildly dilated  RIGHT ATRIUM: The atrium was mildly dilated  MITRAL VALVE: Valve structure was normal  There was normal leaflet separation  Not well visualized  DOPPLER: The transmitral velocity was within the normal range  There was no evidence for stenosis  There was no significant regurgitation  AORTIC VALVE: The valve was not well visualized  TRICUSPID VALVE: The valve structure was normal  There was normal leaflet separation  DOPPLER: The transtricuspid velocity was within the normal range  There was no evidence for stenosis  There was mild regurgitation  The tricuspid jet  envelope definition was inadequate for estimation of RV systolic pressure  There are no indirect findings (abnormal RV volume or geometry, altered pulmonary flow velocity profile, or leftward septal displacement) which would suggest  moderate or severe pulmonary hypertension  PULMONIC VALVE: Leaflets exhibited normal thickness, no calcification, and normal cuspal separation  DOPPLER: The transpulmonic velocity was within the normal range  There was no regurgitation  PERICARDIUM: There was no pericardial effusion  The pericardium was normal in appearance  AORTA: The root exhibited normal size  SYSTEMIC VEINS: IVC: The inferior vena cava was not well visualized  SYSTEM MEASUREMENT TABLES    2D mode  AV Diam: 3 6 cm  AoR Diam; Mean (2D): 3 6 cm  LA Diam (2D): 3 5 cm  LA Dimension; Mean (2D): 3 5 cm  LA/Ao (2D): 0 97  EDV (2D-Cubed): 141 cm3  EF (2D-Cubed): 57 9 %  ESV (2D-Cubed): 59 3 cm3  FS (2D-Cubed): 25 %  FS (2D-Teich): 25 %  IVS/LVPW (2D): 1 11  IVSd (2D): 0 9 cm  IVSd;  Mean chosen (2D): 1 cm  LVEDV MOD BP: 116 cm3  LVESV MOD (BP): 63 7 cm3  LVIDd (2D): 5 2 cm  LVIDd; Mean (2D): 5 2 cm  LVIDs (2D): 3 9 cm  LVIDs; Mean (2D): 3 9 cm  LVPWd (2D): 0 9 cm  LVPWd; Mean (2D): 0 9 cm  Left Ventricular Ejection Fraction; Method of Disks, Biplane; 2D mode;: 45 1 %  Left Ventricular Ejection Fraction; Teichholz; 2D mode;: 49 3 %  Left Ventricular End Diastolic Volume; Teichholz; 2D mode;: 130 cm3  Left Ventricular End Systolic Volume; Teichholz; 2D mode;: 65 9 cm3  SV (2D-Cubed): 81 7 cm3  SV (BP): 52 3 cm3  Stroke Volume; Teichholz; 2D mode;: 64 1 cm3  RVIDd (2D): 4 2 cm  RVIDd; Mean (2D): 4 2 cm  Right and Left Ventricular End Diastolic Diameter Ratio; 2D mode;: 0 81    Apical four chamber  LV MOD Diam; Recent value; End Diastole (A4C): 1 92 cm  LV MOD Diam; Recent value; End Diastole (A4C): 3 55 cm  LV MOD Diam; Recent value; End Diastole (A4C): 3 83 cm  LV MOD Diam; Recent value; End Diastole (A4C): 3 21 cm  LV MOD Diam; Recent value; End Diastole (A4C): 2 68 cm  LV MOD Diam; Recent value; End Diastole (A4C): 4 04 cm  LV MOD Diam; Recent value; End Diastole (A4C): 4 39 cm  LV MOD Diam; Recent value; End Diastole (A4C): 2 33 cm  LV MOD Diam; Recent value; End Diastole (A4C): 4 32 cm  LV MOD Diam; Recent value; End Diastole (A4C): 4 53 cm  LV MOD Diam; Recent value; End Diastole (A4C): 4 67 cm  LV MOD Diam; Recent value; End Diastole (A4C): 4 74 cm  LV MOD Diam; Recent value; End Diastole (A4C): 4 74 cm  LV MOD Diam; Recent value; End Diastole (A4C): 4 7 cm  LV MOD Diam; Recent value; End Diastole (A4C): 4 6 cm  LV MOD Diam; Recent value; End Diastole (A4C): 4 5 cm  LV MOD Diam; Recent value; End Diastole (A4C): 4 46 cm  LV MOD Diam; Recent value; End Diastole (A4C): 4 43 cm  LV MOD Diam; Recent value; End Diastole (A4C): 4 39 cm  LV MOD Diam; Recent value; End Diastole (A4C): 4 25 cm  LV MOD Diam; Recent value; End Systole (A4C): 1 75 cm  LV MOD Diam; Recent value; End Systole (A4C): 3 05 cm  LV MOD Diam; Recent value; End Systole (A4C): 3 33 cm  LV MOD Diam; Recent value;  End Systole (A4C): 2 73 cm  LV MOD Diam; Recent value; End Systole (A4C): 2 28 cm  LV MOD Diam; Recent value; End Systole (A4C): 3 43 cm  LV MOD Diam; Recent value; End Systole (A4C): 3 64 cm  LV MOD Diam; Recent value; End Systole (A4C): 3 92 cm  LV MOD Diam; Recent value; End Systole (A4C): 3 96 cm  LV MOD Diam; Recent value; End Systole (A4C): 4 03 cm  LV MOD Diam; Recent value; End Systole (A4C): 3 89 cm  LV MOD Diam; Recent value; End Systole (A4C): 3 75 cm  LV MOD Diam; Recent value; End Systole (A4C): 3 71 cm  LV MOD Diam; Recent value; End Systole (A4C): 3 68 cm  LV MOD Diam; Recent value; End Systole (A4C): 3 68 cm  LV MOD Diam; Recent value; End Systole (A4C): 3 68 cm  LV MOD Diam; Recent value; End Systole (A4C): 2 07 cm  LV MOD Diam; Recent value; End Systole (A4C): 3 85 cm  LV MOD Diam; Recent value; End Systole (A4C): 3 82 cm  LV MOD Diam; Recent value; End Systole (A4C): 3 57 cm  LVEF MOD A4C: 40 5 %  Left Ventricle diastolic major axis; Most recent value chosen; Method of Disks, Single Plane; 2D mode; Apical four chamber;: 8 15 cm  Left Ventricle systolic major axis; Most recent value chosen; Method of Disks, Single Plane; 2D mode; Apical four chamber;: 6 8 cm  Left Ventricular Diastolic Area; Most recent value chosen; Method of Disks, Single Plane; 2D mode; Apical four chamber;: 3280 mm2  Left Ventricular End Diastolic Volume; Most recent value chosen; Method of Disks, Single Plane; 2D mode; Apical four chamber;: 108 cm3  Left Ventricular End Systolic Volume; Most recent value chosen; Method of Disks, Single Plane; 2D mode; Apical four chamber;: 63 6 cm3  Left Ventricular Systolic Area; Most recent value chosen; Method of Disks, Single Plane; 2D mode; Apical four chamber;: 2320 mm2  SV MOD A4C: 43 3 cm3    Apical two chamber  LV MOD Diam; Recent value; End Diastole (A2C): 1 79 cm  LV MOD Diam; Recent value; End Diastole (A2C): 3 29 cm  LV MOD Diam; Recent value;  End Diastole (A2C): 3 82 cm  LV MOD Diam; Recent value; End Diastole (A2C): 4 03 cm  LV MOD Diam; Recent value; End Diastole (A2C): 2 35 cm  LV MOD Diam; Recent value; End Diastole (A2C): 4 94 cm  LV MOD Diam; Recent value; End Diastole (A2C): 5 26 cm  LV MOD Diam; Recent value; End Diastole (A2C): 5 54 cm  LV MOD Diam; Recent value; End Diastole (A2C): 5 64 cm  LV MOD Diam; Recent value; End Diastole (A2C): 5 61 cm  LV MOD Diam; Recent value; End Diastole (A2C): 5 5 cm  LV MOD Diam; Recent value; End Diastole (A2C): 5 26 cm  LV MOD Diam; Recent value; End Diastole (A2C): 5 01 cm  LV MOD Diam; Recent value; End Diastole (A2C): 4 84 cm  LV MOD Diam; Recent value; End Diastole (A2C): 4 59 cm  LV MOD Diam; Recent value; End Diastole (A2C): 4 38 cm  LV MOD Diam; Recent value; End Diastole (A2C): 4 17 cm  LV MOD Diam; Recent value; End Diastole (A2C): 3 96 cm  LV MOD Diam; Recent value; End Diastole (A2C): 3 61 cm  LV MOD Diam; Recent value; End Diastole (A2C): 2 74 cm  LV MOD Diam; Recent value; End Systole (A2C): 1 19 cm  LV MOD Diam; Recent value; End Systole (A2C): 1 85 cm  LV MOD Diam; Recent value; End Systole (A2C): 2 76 cm  LV MOD Diam; Recent value; End Systole (A2C): 3 08 cm  LV MOD Diam; Recent value; End Systole (A2C): 3 18 cm  LV MOD Diam; Recent value; End Systole (A2C): 4 55 cm  LV MOD Diam; Recent value; End Systole (A2C): 4 48 cm  LV MOD Diam; Recent value; End Systole (A2C): 4 37 cm  LV MOD Diam; Recent value; End Systole (A2C): 4 2 cm  LV MOD Diam; Recent value; End Systole (A2C): 4 06 cm  LV MOD Diam; Recent value; End Systole (A2C): 3 92 cm  LV MOD Diam; Recent value; End Systole (A2C): 3 67 cm  LV MOD Diam; Recent value; End Systole (A2C): 3 43 cm  LV MOD Diam; Recent value; End Systole (A2C): 3 25 cm  LV MOD Diam; Recent value; End Systole (A2C): 1 96 cm  LV MOD Diam; Recent value; End Systole (A2C): 4 13 cm  LV MOD Diam; Recent value; End Systole (A2C): 4 41 cm  LV MOD Diam; Recent value;  End Systole (A2C): 3 08 cm  LV MOD Diam; Recent value; End Systole (A2C): 2 97 cm  LV MOD Diam; Recent value; End Systole (A2C): 2 38 cm  LVEF MOD A2C: 50 4 %  Left Ventricle diastolic major axis; Most recent value chosen; Method of Disks, Single Plane; 2D mode; Apical two chamber;: 7 75 cm  Left Ventricle systolic major axis; Most recent value chosen; Method of Disks, Single Plane; 2D mode; Apical two chamber;: 6 33 cm  Left Ventricular Diastolic Area; Most recent value chosen; Method of Disks, Single Plane; 2D mode; Apical two chamber;: 3360 mm2  Left Ventricular End Diastolic Volume; Most recent value chosen; Method of Disks, Single Plane; 2D mode; Apical two chamber;: 121 cm3  Left Ventricular End Systolic Volume; Most recent value chosen; Method of Disks, Single Plane; 2D mode; Apical two chamber;: 60 cm3  Left Ventricular Systolic Area; Most recent value chosen; Method of Disks, Single Plane; 2D mode; Apical two chamber;: 2130 mm2  SV MOD A2C: 61 cm3    M mode  Tricuspid Annular Plane Systolic Excursion; Mean; Mean value chosen; Tricuspid Annulus; M mode;: 2 08 cm  Tricuspid Annular Plane Systolic Excursion; Tricuspid Annulus; M mode;: 2 08 cm    Tissue Doppler Imaging  LV Peak Early Cardona Tissue Pedro; Medial MA (TDI): 59 8 mm/s  Left Ventricular Peak Early Diastolic Tissue Velocity; Mean; Mean value chosen; Medial Mitral Annulus; Tissue Doppler Imaging;: 59 8 mm/s    Unspecified Scan Mode  MV Peak Pedro/LV Peak Tissue Pedro E-Wave; Medial MA: 6 9  DT; Antegrade Flow: 250 ms  DT; Mean; Antegrade Flow: 250 ms  Dec Bacon; Antegrade Flow: 2370 mm/s2  Dec Bacon; Mean; Antegrade Flow: 2370 mm/s2  MV A Pedro: 344 mm/s  MV E Pedro: 592 mm/s  MV E Pedro: 228 mm/s  MV E/A Ratio: 1 2  MV Peak A Epdro: 344 mm/s  MV Peak E Pedro; Mean; Antegrade Flow: 410 mm/s  MVA (PHT): 301 mm2  PHT: 73 ms  PHT;  Mean: 73 ms    [address] Accredited Echocardiography Laboratory    Prepared and electronically signed by    Maribel Figueroa DO  Signed 05-Feb-2019 16:13:12       Results for orders placed during the hospital encounter of 17   [place]    Narrative [address]  [address], [address]  [phone]    Transesophageal Echocardiogram  2D and Color Doppler    Study date:  21-Dec-2017    Patient: Brent Estrella  MR number: [id number]  Account number: [de-identified]  : 1940  Age: 78 years  Gender: Male  Status: Inpatient  Location: Echo lab  Height: 70 in  Weight: 248 lb  BP: 126/ 86 mmHg    Indications: Atrial fibrillation  Diagnoses: I48 0 - Atrial fibrillation    Sonographer:  Polly BHAKTA, [facility]  Primary Physician:  Jaspreet Palmer  Referring Physician:  Shweta Root MD  Group:  [facility]  Interpreting Physician:  Shweta Root MD    SUMMARY    LEFT VENTRICLE:  Systolic function was mildly to moderately reduced by visual assessment  Ejection fraction was estimated to be 40 %  There was mild diffuse hypokinesis  LEFT ATRIUM:  The atrium was mildly dilated  LEFT ATRIAL APPENDAGE:  The appendage was moderately to markedly dilated  The function was severely reduced (markedly reduced emptying velocity)  There was a small thrombus  ATRIAL SEPTUM:  No defect or patent foramen ovale was identified  MITRAL VALVE:  There was trace regurgitation  TRICUSPID VALVE:  There was trace regurgitation  HISTORY: PRIOR HISTORY: Diastolic CHF, Obesity  Risk factors: hypertension, diabetes, and hypercholesterolemia  PROCEDURE: The procedure was performed in the echo lab  This was a routine study  The risks and alternatives of the procedure were explained to the patient and informed consent was obtained  The transesophageal approach was used  The study  included complete 2D imaging and color Doppler  by the attending cardiologist  Echocardiographic views were limited due to lung interference  Image quality was adequate  There were no complications during the procedure  MEDICATIONS:  Anesthesia      LEFT VENTRICLE: Size was normal  Systolic function was mildly to moderately reduced by visual assessment  Ejection fraction was estimated to be 40 %  There was mild diffuse hypokinesis  Wall thickness was normal     RIGHT VENTRICLE: The size was normal  Systolic function was normal     LEFT ATRIUM: The atrium was mildly dilated  APPENDAGE: The appendage was moderately to markedly dilated  There was a small thrombus  DOPPLER: The function was severely reduced (markedly reduced emptying velocity)  ATRIAL SEPTUM: No defect or patent foramen ovale was identified  RIGHT ATRIUM: The atrium was small  MITRAL VALVE: Valve structure was normal  There was normal leaflet separation  There was no echocardiographic evidence of vegetation  DOPPLER: There was trace regurgitation  AORTIC VALVE: The valve was trileaflet  Leaflets exhibited normal thickness and normal cuspal separation  There was no echocardiographic evidence of vegetation  DOPPLER: There was no regurgitation  TRICUSPID VALVE: The valve structure was normal  There was normal leaflet separation  There was no echocardiographic evidence of vegetation  DOPPLER: There was trace regurgitation  PULMONIC VALVE: Leaflets exhibited normal thickness, no calcification, and normal cuspal separation  There was no echocardiographic evidence of vegetation  PERICARDIUM: There was no pericardial effusion  AORTA: The root exhibited normal size  There was no atheroma  There was no evidence for dissection  There was no evidence for aneurysm  [address] Accredited Echocardiography Laboratory    Prepared and electronically signed by    Jasbir Bolanos MD  Signed 21-Dec-2017 16:01:31       No results found for this or any previous visit  No results found for this or any previous visit      Meds/Allergies   current meds:   Current Facility-Administered Medications   Medication Dose Route Frequency    acetaminophen (TYLENOL) tablet 650 mg  650 mg Oral Q6H PRN    docusate sodium (COLACE) capsule 100 mg  100 mg Oral BID PRN    DULoxetine (CYMBALTA) delayed release capsule 30 mg  30 mg Oral Daily    gabapentin (NEURONTIN) capsule 100 mg  100 mg Oral TID    heparin (porcine) 25,000 units in 250 mL infusion (premix)  3-30 Units/kg/hr (Order-Specific) Intravenous Titrated    heparin (porcine) injection 4,600 Units  4,600 Units Intravenous PRN    heparin (porcine) injection 9,200 Units  9,200 Units Intravenous PRN    HYDROmorphone (DILAUDID) injection 0 5 mg  0 5 mg Intravenous Q3H PRN    insulin regular (HumuLIN R,NovoLIN R) 1 Units/mL in sodium chloride 0 9 % 100 mL infusion  0 3-21 Units/hr Intravenous Titrated    levothyroxine tablet 50 mcg  50 mcg Oral Early Morning    melatonin tablet 6 mg  6 mg Oral HS    metoprolol (LOPRESSOR) injection 5 mg  5 mg Intravenous Q6H PRN    metoprolol tartrate (LOPRESSOR) tablet 25 mg  25 mg Oral Once    metoprolol tartrate (LOPRESSOR) tablet 75 mg  75 mg Oral Q12H [address]    multi-electrolyte (ISOLYTE-S PH 7 4 equivalent) IV solution  50 mL/hr Intravenous Continuous    naloxone (NARCAN) 0 04 mg/mL syringe 0 04 mg  0 04 mg Intravenous Q1MIN PRN    ondansetron (ZOFRAN) injection 4 mg  4 mg Intravenous Q4H PRN    oxyCODONE (ROXICODONE) IR tablet 2 5 mg  2 5 mg Oral Q4H PRN    Or    oxyCODONE (ROXICODONE) IR tablet 5 mg  5 mg Oral Q4H PRN    potassium chloride (K-DUR,KLOR-CON) CR tablet 40 mEq  40 mEq Oral BID    senna-docusate sodium (SENOKOT S) 8 6-50 mg per tablet 2 tablet  2 tablet Oral Daily     Prescriptions Prior to Admission   Medication    apixaban (ELIQUIS) 5 mg    DULoxetine (CYMBALTA) 30 mg delayed release capsule    gabapentin (NEURONTIN) 300 mg capsule    insulin aspart (NovoLOG) 100 units/mL injection    insulin detemir (LEVEMIR) 100 units/mL subcutaneous injection    levothyroxine 50 mcg tablet    lisinopril (ZESTRIL) 5 mg tablet    metoprolol succinate (TOPROL-XL) 100 mg 24 hr tablet    rosuvastatin (CRESTOR) 40 MG tablet         heparin (porcine) 3-30 Units/kg/hr (Order-Specific) Last Rate: 11 Units/kg/hr (02/09/19 7969)   insulin regular (HumuLIN R,NovoLIN R) infusion 0 3-21 Units/hr Last Rate: 2 Units/hr (02/09/19 1005)   multi-electrolyte 50 mL/hr Last Rate: 50 mL/hr (02/09/19 0787)     Assessment:  Principal Problem:    SEVEN (acute kidney injury) ([facility] [address] )  Active Problems:    Essential hypertension    Hyperlipidemia    Atrial fibrillation with rapid ventricular response (HCC)    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

## 2019-02-09 NOTE — ASSESSMENT & PLAN NOTE
Afib w/ RVR at presentation, known atrial thrombus, BLE thromboembolism, noncompliant with anticoagulation  - Rate mostly controlled   Increased HR overnight    Plan:  --Continue Heparin gtt  --Rate controlled- Lopressor  --Cardiology following

## 2019-02-10 NOTE — PROGRESS NOTES
02/10/19 1200   Spiritual Beliefs/Perceptions   Support Systems Family members   Stress Factors   Family Stress Factors Health changes   Coping Responses   Family Coping Open/discussion   Plan of Care   Comments Facilitated story telling with one of pt's siblings  Attended a family family meeting regarding hopsice decisions     Assessment Completed by: Unit visit

## 2019-02-10 NOTE — ASSESSMENT & PLAN NOTE
[place] IIb acute ischemia in the setting of left atrial thrombus and patient noncompliant with Eliquis therapy  Patient presenting with several days of decreased/no motor function and no distal signals    S/P B/L femoral embolectomy w/ b/l lower limb fasciotomies, VAC placement 2/4  - Neuropsych consulted to weigh in regarding competence - seen by neuropsych but no documentation in note regarding competency    - Not competent per Palliative Care - family meeting today at 11PM    Plan:  --Cont heparin gtt  - lifelong anticoagulation per cardiology  --Cont VAC to b/l lower extremities

## 2019-02-10 NOTE — PROGRESS NOTES
Progress note - Palliative and Supportive Care   Radha Syed 70 y o  male [id number]    Assessment:   -   Patient Active Problem List   Diagnosis    Essential hypertension    Hyperlipidemia    Atrial fibrillation with rapid ventricular response (HCC)    Type 2 diabetes mellitus with stage 4 chronic kidney disease ([facility] )    DDD (degenerative disc disease), lumbosacral    Chronic diastolic congestive heart failure ([facility] )    Hypothyroidism    Thrombus of left atrial appendage without antecedent myocardial infarction    Ischemia of both lower extremities    Bilateral occlusive lower extremity thrombus    SEVEN (acute kidney injury) ([facility] )    Goals of care, counseling/discussion    Pain       Plan:  1  Symptom management -    - D/C all unnecessary/non-comfort oriented medications  D/C blood draws  - Increase OxyIR to 5 mg TID  OxyIR 5-10 mg available for moderate-severe pain  Dilaudid available for breakthrough pain  - Seroquel order placed for bedtime  2  Goals -    -Hospice cares  CM notified of same  Code Status: Comfort measures- Level 4   Decisional apparatus:  Patient is not competent on my exam today  If competence is lost, patient's substitute decision maker would default to adults siblings by [place]  Advance Directive / Living Will / POLST:  Documentation brought by sister which states she is legal and financial POA for patient  I have reviewed the patient's controlled substance dispensing history in the Prescription Drug Monitoring Program in compliance with the [facility] regulations before prescribing any controlled substances  Interval history:      24 hour events reviewed  Patient continues to refuse medications from time to time  Family meeting at his bedside with POA/SISTER and 3 adult brothers and 2 sister in laws  Discussions concerning goals of care took place including surgery and risks vs  Benefits   Ultimately family has decided if patient does not wish to pursue this serious and complex procedure that may require additional compliance they will respect Earle's decision  VIANEY stated again to me that she wishes for Raymundo Mcneil to have the surgery but is agreeable to hospice cares since she knows he will not watch his diet or keep follow up appointments or comply with medication regimens and likely get reinfected and require further interventions/procedures  Patient's brother, Tyrone Hairston, also mentioned patient's history of MI and concern that he may "die on the table "  was also present for this meeting and provided support to family  After decision to pursue hospice cares was made, I spoke with Raymundo Mcneil- with family at bedside- who expressed his wishes not to undergo surgery even if it meant he would die  We discussed hospice cares and goals to keep his as comfortable as possible       MEDICATIONS / ALLERGIES:     all current active meds have been reviewed and current meds:   Current Facility-Administered Medications   Medication Dose Route Frequency    [MAR Hold] acetaminophen (TYLENOL) tablet 650 mg  650 mg Oral Q6H PRN    [MAR Hold] DULoxetine (CYMBALTA) delayed release capsule 30 mg  30 mg Oral Daily    [MAR Hold] gabapentin (NEURONTIN) capsule 100 mg  100 mg Oral TID    [MAR Hold] HYDROmorphone (DILAUDID) injection 0 5 mg  0 5 mg Intravenous Q3H PRN    [MAR Hold] levothyroxine tablet 50 mcg  50 mcg Oral Early Morning    [MAR Hold] melatonin tablet 6 mg  6 mg Oral HS    [MAR Hold] metoprolol tartrate (LOPRESSOR) tablet 100 mg  100 mg Oral Q12H [address]    [MAR Hold] ondansetron (ZOFRAN) injection 4 mg  4 mg Intravenous Q4H PRN    [MAR Hold] oxyCODONE (ROXICODONE) IR tablet 2 5 mg  2 5 mg Oral Q4H PRN    Or    [MAR Hold] oxyCODONE (ROXICODONE) IR tablet 5 mg  5 mg Oral Q4H PRN    [MAR Hold] oxyCODONE (ROXICODONE) IR tablet 2 5 mg  2 5 mg Oral TID    [MAR Hold] senna-docusate sodium (SENOKOT S) 8 6-50 mg per tablet 2 tablet  2 tablet Oral Daily       Allergies Allergen Reactions    Motrin [Ibuprofen] GI Intolerance       OBJECTIVE:    Physical Exam  Physical Exam   Constitutional: No distress  HENT:   Head: Normocephalic and atraumatic  Eyes:   Eyes closed  Neck: Neck supple  Skin: He is not diaphoretic  Wound vac in place  Nursing note and vitals reviewed  Lab Results:   I have personally reviewed pertinent labs  , CBC:   Lab Results   Component Value Date    WBC 26 96 (H) 02/10/2019    HGB 7 4 (L) 02/10/2019    HCT 24 5 (L) 02/10/2019     (H) 02/10/2019     02/10/2019    MCH 31 1 02/10/2019    MCHC 30 2 (L) 02/10/2019    RDW 14 6 02/10/2019    MPV 10 9 02/10/2019    NRBC 1 02/10/2019   , CMP:   Lab Results   Component Value Date    SODIUM 143 02/10/2019    K 3 4 (L) 02/10/2019     02/10/2019    CO2 30 02/10/2019    BUN 45 (H) 02/10/2019    CREATININE 1 51 (H) 02/10/2019    CALCIUM 7 8 (L) 02/10/2019    EGFR 44 02/10/2019         Counseling / Coordination of Care  Total floor / unit time spent today 55+ minutes  Greater than 50% of total time was spent with the patient and / or family counseling and / or coordination of care  A description of the counseling / coordination of care: family meeting to discuss goals of care, symptom management, psychosocial support

## 2019-02-10 NOTE — ASSESSMENT & PLAN NOTE
Afib w/ RVR at presentation, known atrial thrombus, BLE thromboembolism, noncompliant with anticoagulation  - Rate mostly controlled    Plan:  --Continue Heparin gtt  --Rate increased yesterday working on better control - on Lopressor  --Cardiology following

## 2019-02-10 NOTE — PROGRESS NOTES
Vascular Surgery    Progress Note - Keeley Horn 1940, 70 y o  male MRN: [id number]    Unit/Bed#: [facility] 517-01 Encounter: [id number]    Primary Care Provider: Chris Suazo   Date and time admitted to hospital: 2/4/2019  2:39 PM      Ischemia of both lower extremities  Assessment & Plan  [place] IIb acute ischemia in the setting of left atrial thrombus and patient noncompliant with Eliquis therapy  Patient presenting with several days of decreased/no motor function and no distal signals    S/P B/L femoral embolectomy w/ b/l lower limb fasciotomies, VAC placement 2/4  - Neuropsych consulted to weigh in regarding competence - seen by neuropsych but no documentation in note regarding competency    - Not competent per Palliative Care - family meeting today at 11PM    Plan:  --Cont heparin gtt  - lifelong anticoagulation per cardiology  --Cont VAC to b/l lower extremities      Type 2 diabetes mellitus with stage 4 chronic kidney disease ([facility] )  Assessment & Plan  Care per ICU  Insulin gtt    Atrial fibrillation with rapid ventricular response ([facility] )  Assessment & Plan  Afib w/ RVR at presentation, known atrial thrombus, BLE thromboembolism, noncompliant with anticoagulation  - Rate mostly controlled    Plan:  --Continue Heparin gtt  --Rate increased yesterday working on better control - on Lopressor  --Cardiology following            Subjective/Objective   Subjective:   No acute events overnight  Objective:     Blood pressure 134/87, pulse (!) 116, temperature 99 °F (37 2 °C), temperature source Oral, resp  rate 17, height 5' 10" (1 778 m), weight 129 kg (283 lb 4 7 oz), SpO2 96 %  ,Body mass index is 40 65 kg/m²        Intake/Output Summary (Last 24 hours) at 2/10/2019 0732  Last data filed at 2/10/2019 0721  Gross per 24 hour   Intake 2426 25 ml   Output 1594 ml   Net 832 25 ml       Invasive Devices     Peripheral Intravenous Line            Peripheral IV 02/07/19 Right Antecubital 2 days Peripheral IV 02/07/19 Right Forearm 2 days          Drain            Negative Pressure Wound Therapy (V A C ) Leg Left; Inner; Outer; Lower 5 days    Negative Pressure Wound Therapy (V A C ) Leg Right; Inner 3 days                Physical Exam:     Gen: NAD  Neuro: Only will give intermittent simple answers of yes or no  Intermittently will follow commands  Has been reported that he can move his right toes slightly  CV: RRR  Pulm: no resp distress  Abd: Soft, non-distended, non-tender  B/l fasciotomy VAC sites      Lab, Imaging and other studies:  I have personally reviewed pertinent lab results    , CBC:   Lab Results   Component Value Date    WBC 26 96 (H) 02/10/2019    HGB 7 4 (L) 02/10/2019    HCT 24 5 (L) 02/10/2019     (H) 02/10/2019     02/10/2019    MCH 31 1 02/10/2019    MCHC 30 2 (L) 02/10/2019    RDW 14 6 02/10/2019    MPV 10 9 02/10/2019   , CMP:   Lab Results   Component Value Date    SODIUM 143 02/10/2019    K 3 4 (L) 02/10/2019     02/10/2019    CO2 30 02/10/2019    BUN 45 (H) 02/10/2019    CREATININE 1 51 (H) 02/10/2019    CALCIUM 7 8 (L) 02/10/2019    EGFR 44 02/10/2019     VTE Pharmacologic Prophylaxis: Heparin  VTE Mechanical Prophylaxis: sequential compression device

## 2019-02-10 NOTE — PROGRESS NOTES
Progress Note - Critical Care   Stepan Burkett 70 y o  male MRN: [id number]  Unit/Bed#: [facility] 517-01 Encounter: [id number]    69 yo M w PMH uncontrolled AF, HTN, HLD, DM2, hypothyroidism maintained in ICU w b/l LE ischemia 2/2 AC non-compliance, development of L atrial thrombus, LE ischemia s/p b/; fem embolectomy and faschiotomy  Pending surgical decision regarding BKA  Pt with some lucid moments but waxes and wanes throughout the day; when lucid consistently reports "No" when asked if he would want his leg amputated      Assessment:     Principal Problem:    SEVEN (acute kidney injury) ([facility] )  Active Problems:    Essential hypertension    Hyperlipidemia    Atrial fibrillation with rapid ventricular response (HCC)    Type 2 diabetes mellitus with stage 4 chronic kidney disease (HCC)    Hypothyroidism    Ischemia of both lower extremities    Bilateral occlusive lower extremity thrombus    Goals of care, counseling/discussion    Pain      Plan:          Neuro:    Analgesia - gabapentin 100mg TID (home med), oxy 2 5 Q4 PRN (0/24), oxy 5mg Q4 PRN (2/24), dilaudid 0 5mg Q3 PRN (6/37)   Acute metabolic encephalopathy - pt clinically not competent to make his own decisions or to even participate in discussions regarding this, continue to re-orient as able, CAM-ICU, promote sleep hygiene, melatonin QHS   Mood disorder - cymbalta (home med)                 CV:    Bilateral lower limb ischemia in setting AF w AC noncompliance and L atrial thrombus - s/p b/l fem embolectomy w b/l LE fasciotomies, VAC placement, no active plan for BKA until pt wishes clarified w family, cont hep gtt for now    AF w RVR - AC w hep gtt, lopressor 75mg Q12 w lopressor 5mg IV Q6 PRN (2/24)                 Lung:    Acute hypoxic respiratory insufficiency - resolved, no O2 requirements at this time, continue pulm toilet                  GI:    Bowel regimen - senokot, PRN colace                 FEN:    Electrolytes - Monitor/trend - replete based on deficiencies    Nutrition - poor PO intake, encourage to eat as able, regular diet    Fluid - cont low mIVF to support low PO intake                  :    Urinary retention - pt urinating in small spontaneous voids,  SC x 2 / 24h, urinary retention protocol    Stable renal function, monitor                  ID:    Leukocytosis - presumably reactive to limb ischemia, monitor for s/sx developing infection                  Heme:    DM Type 2 - transition from insulin gtt to SSI Alg    Hypothyroidism - levothyroxine                  Endo:    Ppx - hep gtt, SCDs                            Msk/Skin:    Repetitive repositioning and off-loading to prevent skin break down and ulcerative formation                 Disposition:    Transferred out of ICU to [address] [place] [address], awaiting bed acquisition downstream   Pending family meeting w Palliative today re: goals of care    Chief Complaint: Pt has no complaints today other than "water " When asked if he would want leg surgery he says "No " When asked if he understands he likely wont live without the surgery he says "I understand " When asked if he is ok with dying and if this is "the end" he says, "Yes "     HPI/24hr events: palliative care met w pt and contacted family, arranged for family meeting today at [facility]    Physical Exam: Physical Exam      Vitals:    02/10/19 0211 02/10/19 0400 02/10/19 0557 02/10/19 0600   BP: 111/63 142/74  131/84   BP Location: Left arm Left arm  Left arm   Pulse: (!) 110 (!) 110  (!) 116   Resp: 17 (!) 11  15   Temp:  97 5 °F (36 4 °C)     TempSrc:  Oral     SpO2: 100% 97%  100%   Weight:   129 kg (283 lb 4 7 oz)    Height:         Arterial Line BP: 130/86  Arterial Line MAP (mmHg): 100 mmHg    Temperature:   Temp (24hrs), Av 8 °F (37 1 °C), Min:97 5 °F (36 4 °C), Max:100 6 °F (38 1 °C)    Current: Temperature: 97 5 °F (36 4 °C)    Weights:   IBW: 73 kg    Body mass index is 40 65 kg/m²    Weight (last 2 days)     Date/Time   Weight    02/10/19 0557   129 (283 29)    02/09/19 0600   126 (278 66)              Hemodynamic Monitoring:  N/A     Non-Invasive/Invasive Ventilation Settings:  Respiratory    Lab Data (Last 4 hours)    None         O2/Vent Data (Last 4 hours)    None              No results found for: PHART, MHQ7YES, PO2ART, HUO4UTI, L0XBBKFT, BEART, SOURCE  SpO2: SpO2: 100 %    Intake and Outputs:  I/O       02/08 0701 - 02/09 0700 02/09 0701 - 02/10 0700    P  O   600    I V  (mL/kg) 3838 5 (30 5) 1740 9 (13 6)    IV Piggyback 200     Total Intake(mL/kg) 4038 5 (32 1) 2340 9 (18 3)    Urine (mL/kg/hr) 2160 (0 7) 1594 (0 5)    Drains 100 0    Stool  0    Total Output 2260 1594    Net +1778 5 +746 9          Unmeasured Stool Occurrence  2 x          Nutrition:     Labs:   Results from last 7 days   Lab Units 02/10/19  0521 02/09/19  0408 02/08/19  0403 02/07/19  0549  02/06/19  0429 02/05/19  2350   WBC Thousand/uL 26 96* 25 80* 25 13* 22 26*  --  16 51* 16 58*   HEMOGLOBIN g/dL 7 4* 7 0* 7 0* 7 2*   < > 7 8* 8 0*   HEMATOCRIT % 24 5* 22 1* 21 6* 22 9*   < > 24 1* 24 6*   PLATELETS Thousands/uL 299 228 177 136*  --  120* 131*   NEUTROS PCT %  --   --   --  84*  --  83* 81*   MONOS PCT %  --   --   --  8  --  7 8    < > = values in this interval not displayed      Results from last 7 days   Lab Units 02/09/19  0408 02/08/19  0403 02/07/19  0549  02/05/19  0556  02/04/19  1505 02/04/19  1252   SODIUM mmol/L 140 137 135*   < > 134*   < >  --   --    POTASSIUM mmol/L 3 1* 3 7 3 8   < > 3 8   < >  --   --    CHLORIDE mmol/L 103 102 100   < > 97*   < >  --   --    CO2 mmol/L 27 29 28   < > 28   < >  --   --    CO2, I-STAT mmol/L  --   --   --   --   --   --  38* 32   BUN mg/dL 47* 46* 46*   < > 38*   < >  --   --    CREATININE mg/dL 1 50* 1 55* 1 68*   < > 1 94*   < >  --   --    CALCIUM mg/dL 7 6* 7 5* 7 8*   < > 7 6*   < >  --   --    ALK PHOS U/L  --   --   --   --  63  --   --   --    ALT U/L  --   --   --   --  34  --   --   --    AST U/L  --   --   --   --  211* --   --   --    GLUCOSE, ISTAT mg/dl  --   --   --   --   --   --  183* 197*    < > = values in this interval not displayed  Results from last 7 days   Lab Units 02/09/19  0408 02/07/19  0549 02/06/19  0428   MAGNESIUM mg/dL 2 8* 2 7* 2 5     Results from last 7 days   Lab Units 02/07/19  0549 02/06/19  0428 02/05/19  0556   PHOSPHORUS mg/dL 3 5 3 2 3 9      Results from last 7 days   Lab Units 02/10/19  0521 02/09/19  1717 02/09/19  1119  02/04/19  1814 02/04/19  1245   INR   --   --   --   --  1 19* 1 08   PTT seconds 66* 75* 75*   < > >210* 28    < > = values in this interval not displayed  Results from last 7 days   Lab Units 02/05/19  0624   LACTIC ACID mmol/L 2 7*     0   Lab Value Date/Time    TROPONINI 0 07 (H) 12/19/2017 1339    TROPONINI 0 04 (H) 01/30/2017 0056       Imaging:   No orders to display      I have personally reviewed pertinent reports  and I have personally reviewed pertinent films in PACS    EKG: as per tele nsr no ectopy     Micro:  Lab Results   Component Value Date    BLOODCX No Growth at 48 hrs  02/07/2019    BLOODCX No Growth at 48 hrs  02/07/2019    URINECX No Growth <1000 cfu/mL 01/29/2017       Allergies:    Allergies   Allergen Reactions    Motrin [Ibuprofen] GI Intolerance       Medications:   Scheduled Meds:  Current Facility-Administered Medications:  acetaminophen 650 mg Oral Q6H PRN Yasmeen Fregoso MD    docusate sodium 100 mg Oral BID PRN Brissa Dumnot    DULoxetine 30 mg Oral Daily Brissa Dumont    gabapentin 100 mg Oral TID Brissa Dumont    heparin (porcine) 3-30 Units/kg/hr (Order-Specific) Intravenous Titrated Brissa Dumont Last Rate: 11 Units/kg/hr (02/09/19 1419)   heparin (porcine) 4,600 Units Intravenous PRN Brissa Dumont    heparin (porcine) 9,200 Units Intravenous PRN Brissa Dumont    HYDROmorphone 0 5 mg Intravenous Q3H PRN Brian Denson PA-C    insulin regular (HumuLIN R,NovoLIN R) infusion 0 3-21 Units/hr Intravenous Titrated Jessica Ndiaye MD Last Rate: 0 5 Units/hr (02/10/19 5579)   levothyroxine 50 mcg Oral Early Morning Gavin Bearden PA-C    melatonin 6 mg Oral HS Rudi Tee MD    metoprolol 5 mg Intravenous Q6H PRN Dianne Chu PA-C    metoprolol tartrate 75 mg Oral Q12H [address] Nancyann Goodell, MD    multi-electrolyte 50 mL/hr Intravenous Continuous Dianne Chu PA-C Last Rate: 50 mL/hr (02/09/19 1420)   naloxone 0 04 mg Intravenous Q1MIN PRN Rudi Tee MD    ondansetron 4 mg Intravenous Q4H PRN Leidy Ronde    oxyCODONE 2 5 mg Oral Q4H PRN Gavin Bearden PA-C    Or        oxyCODONE 5 mg Oral Q4H PRN Gavin Bearden PA-C    oxyCODONE 2 5 mg Oral TID Courtney Mena MD    potassium chloride 40 mEq Oral BID Dianne Chu PA-C    senna-docusate sodium 2 tablet Oral Daily Rudi Tee MD      Continuous Infusions:  heparin (porcine) 3-30 Units/kg/hr (Order-Specific) Last Rate: 11 Units/kg/hr (02/09/19 1419)   insulin regular (HumuLIN R,NovoLIN R) infusion 0 3-21 Units/hr Last Rate: 0 5 Units/hr (02/10/19 0556)   multi-electrolyte 50 mL/hr Last Rate: 50 mL/hr (02/09/19 1420)     PRN Meds:    acetaminophen 650 mg Q6H PRN   docusate sodium 100 mg BID PRN   heparin (porcine) 4,600 Units PRN   heparin (porcine) 9,200 Units PRN   HYDROmorphone 0 5 mg Q3H PRN   metoprolol 5 mg Q6H PRN   naloxone 0 04 mg Q1MIN PRN   ondansetron 4 mg Q4H PRN   oxyCODONE 2 5 mg Q4H PRN   Or     oxyCODONE 5 mg Q4H PRN       VTE Pharmacologic Prophylaxis: Sequential compression device (Venodyne)  and Heparin  VTE Mechanical Prophylaxis: sequential compression device    Invasive lines and devices: Invasive Devices     Peripheral Intravenous Line            Peripheral IV 02/07/19 Right Antecubital 2 days    Peripheral IV 02/07/19 Right Forearm 2 days          Drain            Negative Pressure Wound Therapy (V A C ) Leg Left; Inner; Outer; Lower 5 days    Negative Pressure Wound Therapy (V A C ) Leg Right; Inner 3 days                   Counseling / Coordination of Care       Code Status: Level 1 - Full Code     Portions of the record may have been created with voice recognition software  Occasional wrong word or "sound a like" substitutions may have occurred due to the inherent limitations of voice recognition software  Read the chart carefully and recognize, using context, where substitutions have occurred       Suraj Ca PA-C

## 2019-02-10 NOTE — SOCIAL WORK
Cm received call from Dr Brant Marin with palliative pt is inpatient appropriate and family has questions regarding benefits  Cm met with pt's sister Susan Meeks and family and explained I will need to contact the [place] regarding pt's benefit for hospice and and appropriate facilities  Cm will f/u tomorrow

## 2019-02-10 NOTE — PROGRESS NOTES
Cardiology Progress Note - Jenny Ambriz 70 y o  male MRN: [id number]    Unit/Bed#: [facility] 517-01 Encounter: [id number]        Subjective:    No significant events overnight  Lethargic  No complaints  ROS    Objective:   Vitals: Blood pressure 135/67, pulse (!) 126, temperature 99 °F (37 2 °C), temperature source Oral, resp  rate 17, height 5' 10" (1 778 m), weight 129 kg (283 lb 4 7 oz), SpO2 96 %  , Body mass index is 40 65 kg/m² , Orthostatic Blood Pressures      Most Recent Value   Blood Pressure  135/67 filed at 02/10/2019 0945   Patient Position - Orthostatic VS  Lying filed at 02/10/2019 1094         Systolic (10NOE), QPL:507 , Min:103 , UEP:388     Diastolic (40OCJ), TMC:52, Min:61, Max:87      Intake/Output Summary (Last 24 hours) at 2/10/2019 1047  Last data filed at 2/10/2019 0721  Gross per 24 hour   Intake 1678 73 ml   Output 1169 ml   Net 509 73 ml     Weight (last 2 days)     Date/Time   Weight    02/10/19 0557   129 (283 29)    02/09/19 0600   126 (278 66)                Telemetry Review: No significant arrhythmias seen on telemetry review  afib    Physical Exam   Constitutional: He is oriented to person, place, and time  No distress  Eyes: No scleral icterus  Neck: No JVD present  Cardiovascular: Normal rate and regular rhythm  No murmur heard  Pulmonary/Chest: Effort normal and breath sounds normal  No respiratory distress  He has no wheezes  Abdominal: Soft  Bowel sounds are normal  He exhibits no distension  There is no tenderness  Musculoskeletal: He exhibits no edema  Neurological: He is alert and oriented to person, place, and time  Skin: Skin is warm and dry  He is not diaphoretic           Laboratory Results:  Results from last 7 days   Lab Units 02/10/19  0521 02/09/19  0408 02/08/19  0403   CK TOTAL U/L 7,525* 9,216* 10,491*   CK MB INDEX % <1 0 <1 0 <1 0       CBC with diff: Results from last 7 days   Lab Units 02/10/19  0521 02/09/19  0408 02/08/19  0403 02/07/19  0549 02/06/19  1230 02/06/19  0429 02/05/19  2350  02/05/19  0556  02/04/19  1245   WBC Thousand/uL 26 96* 25 80* 25 13* 22 26*  --  16 51* 16 58*  --  19 87*   < > 12 82*   HEMOGLOBIN g/dL 7 4* 7 0* 7 0* 7 2* 8 0* 7 8* 8 0*   < > 11 3*   < > 16 4   I STAT HEMOGLOBIN   --   --   --   --   --   --   --   --   --    < >  --    HEMATOCRIT % 24 5* 22 1* 21 6* 22 9* 24 4* 24 1* 24 6*  --  34 8*   < > 48 6   HEMATOCRIT, ISTAT   --   --   --   --   --   --   --   --   --    < >  --    MCV fL 103* 100* 100* 100*  --  98 98  --  98   < > 95   PLATELETS Thousands/uL 299 228 177 136*  --  120* 131*  --  167   < > 177   MCH pg 31 1 31 5 32 4 31 4  --  31 7 31 9  --  31 8   < > 32 0   MCHC g/dL 30 2* 31 7 32 4 31 4  --  32 4 32 5  --  32 5   < > 33 7   RDW % 14 6 14 3 14 3 14 1  --  14 0 13 9  --  13 8   < > 13 5   MPV fL 10 9 12 1 11 8 11 6  --  10 9 11 3  --  11 8   < > 11 6   NRBC AUTO /100 WBCs 1  --   --  0  --  0 0  --  0  --  0    < > = values in this interval not displayed           CMP:  Results from last 7 days   Lab Units 02/10/19  0521 02/09/19  0408 02/08/19  0403 02/07/19  0549 02/07/19  0006 02/06/19  1703 02/06/19  1230  02/05/19  0556  02/04/19  1505 02/04/19  1252   POTASSIUM mmol/L 3 4* 3 1* 3 7 3 8 3 9 3 6 3 6   < > 3 8   < >  --   --    CHLORIDE mmol/L 105 103 102 100 100 101 103   < > 97*   < >  --   --    CO2 mmol/L 30 27 29 28 29 27 26   < > 28   < >  --   --    CO2, I-STAT mmol/L  --   --   --   --   --   --   --   --   --   --  38* 32   BUN mg/dL 45* 47* 46* 46* 44* 41* 39*   < > 38*   < >  --   --    CREATININE mg/dL 1 51* 1 50* 1 55* 1 68* 1 77* 1 77* 1 76*   < > 1 94*   < >  --   --    GLUCOSE, ISTAT mg/dl  --   --   --   --   --   --   --   --   --   --  183* 197*   CALCIUM mg/dL 7 8* 7 6* 7 5* 7 8* 7 6* 7 1* 7 1*   < > 7 6*   < >  --   --    AST U/L  --   --   --   --   --   --   --   --  211*  --   --   --    ALT U/L  --   --   --   --   --   --   --   --  34  --   --   --    ALK PHOS U/L --   --   --   --   --   --   --   --  63  --   --   --    EGFR ml/min/1 73sq m 44 45 43 39 36 36 37   < > 33   < >  --  45    < > = values in this interval not displayed  BMP:  Results from last 7 days   Lab Units 02/10/19  0521 02/09/19  0408 02/08/19  0403 02/07/19  0549 02/07/19  0006 02/06/19  1703 02/06/19  1230  02/04/19  1505   POTASSIUM mmol/L 3 4* 3 1* 3 7 3 8 3 9 3 6 3 6   < >  --    CHLORIDE mmol/L 105 103 102 100 100 101 103   < >  --    CO2 mmol/L 30 27 29 28 29 27 26   < >  --    CO2, I-STAT mmol/L  --   --   --   --   --   --   --   --  38*   BUN mg/dL 45* 47* 46* 46* 44* 41* 39*   < >  --    CREATININE mg/dL 1 51* 1 50* 1 55* 1 68* 1 77* 1 77* 1 76*   < >  --    GLUCOSE, ISTAT mg/dl  --   --   --   --   --   --   --   --  183*   CALCIUM mg/dL 7 8* 7 6* 7 5* 7 8* 7 6* 7 1* 7 1*   < >  --     < > = values in this interval not displayed  BNP: No results for input(s): BNP in the last 72 hours  Magnesium:   Results from last 7 days   Lab Units 02/10/19  0521 02/09/19  0408 02/07/19  0549 02/06/19  0428 02/05/19  2244 02/05/19  0556 02/04/19  2358   MAGNESIUM mg/dL 2 8* 2 8* 2 7* 2 5 2 4 2 2 2 3       Coags:   Results from last 7 days   Lab Units 02/10/19  0521 02/09/19  1717 02/09/19  1119 02/09/19  0409 02/08/19  2051 02/08/19  0403 02/07/19  1957  02/04/19  1814 02/04/19  1245   PTT seconds 66* 75* 75* 110* 58* 53* 123*   < > >210* 28   INR   --   --   --   --   --   --   --   --  1 19* 1 08    < > = values in this interval not displayed         TSH: No results found for: TSH    Hemoglobin A1C   Results from last 7 days   Lab Units 02/07/19  0549   HEMOGLOBIN A1C % 11 1*       Lipid Profile:       Cardiac testing:   Results for orders placed during the hospital encounter of 02/04/19   Echo complete with contrast if indicated    Narrative [facility] [address] [place] [address], [address] [place] [address]  [phone]    Transthoracic Echocardiogram  2D, M-mode, Doppler, and Color Doppler    Study date:  2019    Patient: Merna Ratliff  MR number: [id number]  Account number: [de-identified]  : 1940  Age: 70 years  Gender: Male  Status: Inpatient  Location: Bedside  Height: 70 in  Weight: 264 lb  BP: 150/ 60 mmHg    Indications: AFIB    Diagnoses: I48 0 - Atrial fibrillation    Sonographer:  FABIO Retana  Primary Physician:  Asha Meade DO  Referring Physician:  Lex Asher MD  Group:  [facility]  Interpreting Physician:  Talib Shaikh DO    SUMMARY    LEFT VENTRICLE:  Systolic function was mildly reduced  Ejection fraction was estimated in the range of 45 % to 50 %  There was mild diffuse hypokinesis  Wall thickness was at the upper limits of normal     RIGHT VENTRICLE:  The ventricle was mildly dilated  Systolic function was normal     LEFT ATRIUM:  The atrium was mildly dilated  RIGHT ATRIUM:  The atrium was mildly dilated  TRICUSPID VALVE:  There was mild regurgitation  HISTORY: PRIOR HISTORY: CHF, DM2, HTN, HLD, Obesity    PROCEDURE: The procedure was performed at the bedside  This was a routine study  The transthoracic approach was used  The study included complete 2D imaging, M-mode, complete spectral Doppler, and color Doppler  The heart rate was 72 bpm,  at the start of the study  Images were obtained from the parasternal, apical, subcostal, and suprasternal notch acoustic windows  Intravenous contrast ( 0 8 mL Definity in NSS) was administered to opacify the left ventricle  Echocardiographic views were limited due to lung interference  This was a technically difficult study  LEFT VENTRICLE: Size was normal  Systolic function was mildly reduced  Ejection fraction was estimated in the range of 45 % to 50 %  There was mild diffuse hypokinesis  Wall thickness was at the upper limits of normal  DOPPLER: Normal  sinus rhythm was absent   The study was not technically sufficient to allow evaluation of LV diastolic function  RIGHT VENTRICLE: The ventricle was mildly dilated  Systolic function was normal     LEFT ATRIUM: The atrium was mildly dilated  RIGHT ATRIUM: The atrium was mildly dilated  MITRAL VALVE: Valve structure was normal  There was normal leaflet separation  Not well visualized  DOPPLER: The transmitral velocity was within the normal range  There was no evidence for stenosis  There was no significant regurgitation  AORTIC VALVE: The valve was not well visualized  TRICUSPID VALVE: The valve structure was normal  There was normal leaflet separation  DOPPLER: The transtricuspid velocity was within the normal range  There was no evidence for stenosis  There was mild regurgitation  The tricuspid jet  envelope definition was inadequate for estimation of RV systolic pressure  There are no indirect findings (abnormal RV volume or geometry, altered pulmonary flow velocity profile, or leftward septal displacement) which would suggest  moderate or severe pulmonary hypertension  PULMONIC VALVE: Leaflets exhibited normal thickness, no calcification, and normal cuspal separation  DOPPLER: The transpulmonic velocity was within the normal range  There was no regurgitation  PERICARDIUM: There was no pericardial effusion  The pericardium was normal in appearance  AORTA: The root exhibited normal size  SYSTEMIC VEINS: IVC: The inferior vena cava was not well visualized  SYSTEM MEASUREMENT TABLES    2D mode  AV Diam: 3 6 cm  AoR Diam; Mean (2D): 3 6 cm  LA Diam (2D): 3 5 cm  LA Dimension; Mean (2D): 3 5 cm  LA/Ao (2D): 0 97  EDV (2D-Cubed): 141 cm3  EF (2D-Cubed): 57 9 %  ESV (2D-Cubed): 59 3 cm3  FS (2D-Cubed): 25 %  FS (2D-Teich): 25 %  IVS/LVPW (2D): 1 11  IVSd (2D): 0 9 cm  IVSd; Mean chosen (2D): 1 cm  LVEDV MOD BP: 116 cm3  LVESV MOD (BP): 63 7 cm3  LVIDd (2D): 5 2 cm  LVIDd; Mean (2D): 5 2 cm  LVIDs (2D): 3 9 cm  LVIDs;  Mean (2D): 3 9 cm  LVPWd (2D): 0 9 cm  LVPWd; Mean (2D): 0 9 cm  Left Ventricular Ejection Fraction; Method of Disks, Biplane; 2D mode;: 45 1 %  Left Ventricular Ejection Fraction; Teichholz; 2D mode;: 49 3 %  Left Ventricular End Diastolic Volume; Teichholz; 2D mode;: 130 cm3  Left Ventricular End Systolic Volume; Teichholz; 2D mode;: 65 9 cm3  SV (2D-Cubed): 81 7 cm3  SV (BP): 52 3 cm3  Stroke Volume; Teichholz; 2D mode;: 64 1 cm3  RVIDd (2D): 4 2 cm  RVIDd; Mean (2D): 4 2 cm  Right and Left Ventricular End Diastolic Diameter Ratio; 2D mode;: 0 81    Apical four chamber  LV MOD Diam; Recent value; End Diastole (A4C): 1 92 cm  LV MOD Diam; Recent value; End Diastole (A4C): 3 55 cm  LV MOD Diam; Recent value; End Diastole (A4C): 3 83 cm  LV MOD Diam; Recent value; End Diastole (A4C): 3 21 cm  LV MOD Diam; Recent value; End Diastole (A4C): 2 68 cm  LV MOD Diam; Recent value; End Diastole (A4C): 4 04 cm  LV MOD Diam; Recent value; End Diastole (A4C): 4 39 cm  LV MOD Diam; Recent value; End Diastole (A4C): 2 33 cm  LV MOD Diam; Recent value; End Diastole (A4C): 4 32 cm  LV MOD Diam; Recent value; End Diastole (A4C): 4 53 cm  LV MOD Diam; Recent value; End Diastole (A4C): 4 67 cm  LV MOD Diam; Recent value; End Diastole (A4C): 4 74 cm  LV MOD Diam; Recent value; End Diastole (A4C): 4 74 cm  LV MOD Diam; Recent value; End Diastole (A4C): 4 7 cm  LV MOD Diam; Recent value; End Diastole (A4C): 4 6 cm  LV MOD Diam; Recent value; End Diastole (A4C): 4 5 cm  LV MOD Diam; Recent value; End Diastole (A4C): 4 46 cm  LV MOD Diam; Recent value; End Diastole (A4C): 4 43 cm  LV MOD Diam; Recent value; End Diastole (A4C): 4 39 cm  LV MOD Diam; Recent value; End Diastole (A4C): 4 25 cm  LV MOD Diam; Recent value; End Systole (A4C): 1 75 cm  LV MOD Diam; Recent value; End Systole (A4C): 3 05 cm  LV MOD Diam; Recent value; End Systole (A4C): 3 33 cm  LV MOD Diam; Recent value; End Systole (A4C): 2 73 cm  LV MOD Diam; Recent value; End Systole (A4C): 2 28 cm  LV MOD Diam; Recent value;  End Systole (A4C): 3 43 cm  LV MOD Diam; Recent value; End Systole (A4C): 3 64 cm  LV MOD Diam; Recent value; End Systole (A4C): 3 92 cm  LV MOD Diam; Recent value; End Systole (A4C): 3 96 cm  LV MOD Diam; Recent value; End Systole (A4C): 4 03 cm  LV MOD Diam; Recent value; End Systole (A4C): 3 89 cm  LV MOD Diam; Recent value; End Systole (A4C): 3 75 cm  LV MOD Diam; Recent value; End Systole (A4C): 3 71 cm  LV MOD Diam; Recent value; End Systole (A4C): 3 68 cm  LV MOD Diam; Recent value; End Systole (A4C): 3 68 cm  LV MOD Diam; Recent value; End Systole (A4C): 3 68 cm  LV MOD Diam; Recent value; End Systole (A4C): 2 07 cm  LV MOD Diam; Recent value; End Systole (A4C): 3 85 cm  LV MOD Diam; Recent value; End Systole (A4C): 3 82 cm  LV MOD Diam; Recent value; End Systole (A4C): 3 57 cm  LVEF MOD A4C: 40 5 %  Left Ventricle diastolic major axis; Most recent value chosen; Method of Disks, Single Plane; 2D mode; Apical four chamber;: 8 15 cm  Left Ventricle systolic major axis; Most recent value chosen; Method of Disks, Single Plane; 2D mode; Apical four chamber;: 6 8 cm  Left Ventricular Diastolic Area; Most recent value chosen; Method of Disks, Single Plane; 2D mode; Apical four chamber;: 3280 mm2  Left Ventricular End Diastolic Volume; Most recent value chosen; Method of Disks, Single Plane; 2D mode; Apical four chamber;: 108 cm3  Left Ventricular End Systolic Volume; Most recent value chosen; Method of Disks, Single Plane; 2D mode; Apical four chamber;: 63 6 cm3  Left Ventricular Systolic Area; Most recent value chosen; Method of Disks, Single Plane; 2D mode; Apical four chamber;: 2320 mm2  SV MOD A4C: 43 3 cm3    Apical two chamber  LV MOD Diam; Recent value; End Diastole (A2C): 1 79 cm  LV MOD Diam; Recent value; End Diastole (A2C): 3 29 cm  LV MOD Diam; Recent value; End Diastole (A2C): 3 82 cm  LV MOD Diam; Recent value; End Diastole (A2C): 4 03 cm  LV MOD Diam; Recent value;  End Diastole (A2C): 2 35 cm  LV MOD Diam; Recent value; End Diastole (A2C): 4 94 cm  LV MOD Diam; Recent value; End Diastole (A2C): 5 26 cm  LV MOD Diam; Recent value; End Diastole (A2C): 5 54 cm  LV MOD Diam; Recent value; End Diastole (A2C): 5 64 cm  LV MOD Diam; Recent value; End Diastole (A2C): 5 61 cm  LV MOD Diam; Recent value; End Diastole (A2C): 5 5 cm  LV MOD Diam; Recent value; End Diastole (A2C): 5 26 cm  LV MOD Diam; Recent value; End Diastole (A2C): 5 01 cm  LV MOD Diam; Recent value; End Diastole (A2C): 4 84 cm  LV MOD Diam; Recent value; End Diastole (A2C): 4 59 cm  LV MOD Diam; Recent value; End Diastole (A2C): 4 38 cm  LV MOD Diam; Recent value; End Diastole (A2C): 4 17 cm  LV MOD Diam; Recent value; End Diastole (A2C): 3 96 cm  LV MOD Diam; Recent value; End Diastole (A2C): 3 61 cm  LV MOD Diam; Recent value; End Diastole (A2C): 2 74 cm  LV MOD Diam; Recent value; End Systole (A2C): 1 19 cm  LV MOD Diam; Recent value; End Systole (A2C): 1 85 cm  LV MOD Diam; Recent value; End Systole (A2C): 2 76 cm  LV MOD Diam; Recent value; End Systole (A2C): 3 08 cm  LV MOD Diam; Recent value; End Systole (A2C): 3 18 cm  LV MOD Diam; Recent value; End Systole (A2C): 4 55 cm  LV MOD Diam; Recent value; End Systole (A2C): 4 48 cm  LV MOD Diam; Recent value; End Systole (A2C): 4 37 cm  LV MOD Diam; Recent value; End Systole (A2C): 4 2 cm  LV MOD Diam; Recent value; End Systole (A2C): 4 06 cm  LV MOD Diam; Recent value; End Systole (A2C): 3 92 cm  LV MOD Diam; Recent value; End Systole (A2C): 3 67 cm  LV MOD Diam; Recent value; End Systole (A2C): 3 43 cm  LV MOD Diam; Recent value; End Systole (A2C): 3 25 cm  LV MOD Diam; Recent value; End Systole (A2C): 1 96 cm  LV MOD Diam; Recent value; End Systole (A2C): 4 13 cm  LV MOD Diam; Recent value; End Systole (A2C): 4 41 cm  LV MOD Diam; Recent value; End Systole (A2C): 3 08 cm  LV MOD Diam; Recent value; End Systole (A2C): 2 97 cm  LV MOD Diam; Recent value;  End Systole (A2C): 2 38 cm  LVEF MOD A2C: 50 4 %  Left Ventricle diastolic major axis; Most recent value chosen; Method of Disks, Single Plane; 2D mode; Apical two chamber;: 7 75 cm  Left Ventricle systolic major axis; Most recent value chosen; Method of Disks, Single Plane; 2D mode; Apical two chamber;: 6 33 cm  Left Ventricular Diastolic Area; Most recent value chosen; Method of Disks, Single Plane; 2D mode; Apical two chamber;: 3360 mm2  Left Ventricular End Diastolic Volume; Most recent value chosen; Method of Disks, Single Plane; 2D mode; Apical two chamber;: 121 cm3  Left Ventricular End Systolic Volume; Most recent value chosen; Method of Disks, Single Plane; 2D mode; Apical two chamber;: 60 cm3  Left Ventricular Systolic Area; Most recent value chosen; Method of Disks, Single Plane; 2D mode; Apical two chamber;: 2130 mm2  SV MOD A2C: 61 cm3    M mode  Tricuspid Annular Plane Systolic Excursion; Mean; Mean value chosen; Tricuspid Annulus; M mode;: 2 08 cm  Tricuspid Annular Plane Systolic Excursion; Tricuspid Annulus; M mode;: 2 08 cm    Tissue Doppler Imaging  LV Peak Early Cardona Tissue Pedro; Medial MA (TDI): 59 8 mm/s  Left Ventricular Peak Early Diastolic Tissue Velocity; Mean; Mean value chosen; Medial Mitral Annulus; Tissue Doppler Imaging;: 59 8 mm/s    Unspecified Scan Mode  MV Peak Pedro/LV Peak Tissue Pedro E-Wave; Medial MA: 6 9  DT; Antegrade Flow: 250 ms  DT; Mean; Antegrade Flow: 250 ms  Dec [place]; Antegrade Flow: 2370 mm/s2  Dec [place]; Mean; Antegrade Flow: 2370 mm/s2  MV A Pedro: 344 mm/s  MV E Pedro: 592 mm/s  MV E Pedro: 228 mm/s  MV E/A Ratio: 1 2  MV Peak A Pedro: 344 mm/s  MV Peak E Pedro; Mean; Antegrade Flow: 410 mm/s  MVA (PHT): 301 mm2  PHT: 73 ms  PHT;  Mean: 73 ms    [address] [facility]ch[facility]    Prepared and electronically signed by    Lora Malhotra DO  Signed 05-Feb-2019 16:13:12       Results for orders placed during the hospital encounter of 12/19/17   [place]    Narrative [address]  [address] [place] [address]  [phone]    Transesophageal Echocardiogram  2D and Color Doppler    Study date:  21-Dec-2017    Patient: Marcin Bower  MR number: [id number]  Account number: [de-identified]  : 1940  Age: 78 years  Gender: Male  Status: Inpatient  Location: Echo lab  Height: 70 in  Weight: 248 lb  BP: 126/ 86 mmHg    Indications: Atrial fibrillation  Diagnoses: I48 0 - Atrial fibrillation    Sonographer:  Mae BHAKTA, [facility]  Primary Physician:  Kandice Brunner  Referring Physician:  Edward Hirsch MD  Group:  [facility]  Interpreting Physician:  Edward Hirsch MD    SUMMARY    LEFT VENTRICLE:  Systolic function was mildly to moderately reduced by visual assessment  Ejection fraction was estimated to be 40 %  There was mild diffuse hypokinesis  LEFT ATRIUM:  The atrium was mildly dilated  LEFT ATRIAL APPENDAGE:  The appendage was moderately to markedly dilated  The function was severely reduced (markedly reduced emptying velocity)  There was a small thrombus  ATRIAL SEPTUM:  No defect or patent foramen ovale was identified  MITRAL VALVE:  There was trace regurgitation  TRICUSPID VALVE:  There was trace regurgitation  HISTORY: PRIOR HISTORY: Diastolic CHF, Obesity  Risk factors: hypertension, diabetes, and hypercholesterolemia  PROCEDURE: The procedure was performed in the echo lab  This was a routine study  The risks and alternatives of the procedure were explained to the patient and informed consent was obtained  The transesophageal approach was used  The study  included complete 2D imaging and color Doppler  by the attending cardiologist  Echocardiographic views were limited due to lung interference  Image quality was adequate  There were no complications during the procedure  MEDICATIONS:  Anesthesia  LEFT VENTRICLE: Size was normal  Systolic function was mildly to moderately reduced by visual assessment   Ejection fraction was estimated to be 40 %  There was mild diffuse hypokinesis  Wall thickness was normal     RIGHT VENTRICLE: The size was normal  Systolic function was normal     LEFT ATRIUM: The atrium was mildly dilated  APPENDAGE: The appendage was moderately to markedly dilated  There was a small thrombus  DOPPLER: The function was severely reduced (markedly reduced emptying velocity)  ATRIAL SEPTUM: No defect or patent foramen ovale was identified  RIGHT ATRIUM: The atrium was small  MITRAL VALVE: Valve structure was normal  There was normal leaflet separation  There was no echocardiographic evidence of vegetation  DOPPLER: There was trace regurgitation  AORTIC VALVE: The valve was trileaflet  Leaflets exhibited normal thickness and normal cuspal separation  There was no echocardiographic evidence of vegetation  DOPPLER: There was no regurgitation  TRICUSPID VALVE: The valve structure was normal  There was normal leaflet separation  There was no echocardiographic evidence of vegetation  DOPPLER: There was trace regurgitation  PULMONIC VALVE: Leaflets exhibited normal thickness, no calcification, and normal cuspal separation  There was no echocardiographic evidence of vegetation  PERICARDIUM: There was no pericardial effusion  AORTA: The root exhibited normal size  There was no atheroma  There was no evidence for dissection  There was no evidence for aneurysm  [address] Accredited Echocardiography Laboratory    Prepared and electronically signed by    Matt Garay MD  Signed 21-Dec-2017 16:01:31       No results found for this or any previous visit  No results found for this or any previous visit      Meds/Allergies   current meds:   Current Facility-Administered Medications   Medication Dose Route Frequency    acetaminophen (TYLENOL) tablet 650 mg  650 mg Oral Q6H PRN    docusate sodium (COLACE) capsule 100 mg  100 mg Oral BID PRN    DULoxetine (CYMBALTA) delayed release capsule 30 mg  30 mg Oral Daily    gabapentin (NEURONTIN) capsule 100 mg  100 mg Oral TID    heparin (porcine) 25,000 units in 250 mL infusion (premix)  3-30 Units/kg/hr (Order-Specific) Intravenous Titrated    heparin (porcine) injection 4,600 Units  4,600 Units Intravenous PRN    heparin (porcine) injection 9,200 Units  9,200 Units Intravenous PRN    HYDROmorphone (DILAUDID) injection 0 5 mg  0 5 mg Intravenous Q3H PRN    insulin lispro (HumaLOG) 100 units/mL subcutaneous injection 4-20 Units  4-20 Units Subcutaneous TID AC    levothyroxine tablet 50 mcg  50 mcg Oral Early Morning    melatonin tablet 6 mg  6 mg Oral HS    metoprolol (LOPRESSOR) injection 5 mg  5 mg Intravenous Q6H PRN    metoprolol tartrate (LOPRESSOR) tablet 100 mg  100 mg Oral Q12H DINO    metoprolol tartrate (LOPRESSOR) tablet 25 mg  25 mg Oral Once    multi-electrolyte (ISOLYTE-S PH 7 4 equivalent) IV solution  50 mL/hr Intravenous Continuous    naloxone (NARCAN) 0 04 mg/mL syringe 0 04 mg  0 04 mg Intravenous Q1MIN PRN    ondansetron (ZOFRAN) injection 4 mg  4 mg Intravenous Q4H PRN    oxyCODONE (ROXICODONE) IR tablet 2 5 mg  2 5 mg Oral Q4H PRN    Or    oxyCODONE (ROXICODONE) IR tablet 5 mg  5 mg Oral Q4H PRN    oxyCODONE (ROXICODONE) IR tablet 2 5 mg  2 5 mg Oral TID    potassium chloride (K-DUR,KLOR-CON) CR tablet 40 mEq  40 mEq Oral BID    senna-docusate sodium (SENOKOT S) 8 6-50 mg per tablet 2 tablet  2 tablet Oral Daily     Medications Prior to Admission   Medication    apixaban (ELIQUIS) 5 mg    DULoxetine (CYMBALTA) 30 mg delayed release capsule    gabapentin (NEURONTIN) 300 mg capsule    insulin aspart (NovoLOG) 100 units/mL injection    insulin detemir (LEVEMIR) 100 units/mL subcutaneous injection    levothyroxine 50 mcg tablet    lisinopril (ZESTRIL) 5 mg tablet    metoprolol succinate (TOPROL-XL) 100 mg 24 hr tablet    rosuvastatin (CRESTOR) 40 MG tablet         heparin (porcine) 3-30 Units/kg/hr (Order-Specific) Last Rate: 11 Units/kg/hr (02/10/19 0942)   multi-electrolyte 50 mL/hr Last Rate: 50 mL/hr (02/10/19 0943)     Assessment:  Principal Problem:    SEVEN (acute kidney injury) (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

## 2019-02-11 NOTE — ASSESSMENT & PLAN NOTE
[place] IIb acute ischemia in the setting of left atrial thrombus and patient noncompliant with Eliquis therapy  Patient presenting with several days of decreased/no motor function and no distal signals    S/P B/L femoral embolectomy w/ b/l lower limb fasciotomies, VAC placement 2/4  - Neuropsych consulted to weigh in regarding competence - seen by neuropsych but no documentation in note regarding competency    - Not competent per Palliative Care    Plan:  --Level 4 Comfort measures only   --Dispo planning for Hospice care

## 2019-02-11 NOTE — SOCIAL WORK
Willi received call from Dr Wicho Stubbs with the [facility] advised we are looking for inpatient hospice  Dr Wicho Stubbs will have  call cm back

## 2019-02-11 NOTE — SOCIAL WORK
Cm received call from hospice liaison Maryjo Damon she checked with finance and hospice can be billed under [facility]  Per Maryjo Damon there are no inpt hospice beds available today and will need to re-evaluate tomorrow

## 2019-02-11 NOTE — PROGRESS NOTES
Patient repositioned and made comfortable, patient unable to swallow pills with applesauce  Patient complains of no pain at this time

## 2019-02-11 NOTE — SOCIAL WORK
Hospice referral made to [facility], family does not want pt transferred to [place] would prefer to see if [facility] can be used for hospice services  Willi relayed information to hospice liaison  Willi spoke with Norris Herrera at [facility] who states request for hospice needs to come from the [facility]  Willi contacted [facility] at [phone] spoke with nurse Gay to request inpatient hospice referral for pt  Per Deidra she will have a

## 2019-02-11 NOTE — PROGRESS NOTES
Vascular Surgery    Progress Note - Martha Adrianodore 1940, 70 y o  male MRN: [id number]    Unit/Bed#: [facility] 528-01 Encounter: [id number]    Primary Care Provider: Melissa Foster   Date and time admitted to hospital: 2/4/2019  2:39 PM        Ischemia of both lower extremities  Assessment & Plan  [place] IIb acute ischemia in the setting of left atrial thrombus and patient noncompliant with Eliquis therapy  Patient presenting with several days of decreased/no motor function and no distal signals    S/P B/L femoral embolectomy w/ b/l lower limb fasciotomies, VAC placement 2/4  - Neuropsych consulted to weigh in regarding competence - seen by neuropsych but no documentation in note regarding competency    - Not competent per Palliative Care    Plan:  --Level 4 Comfort measures only   --Dispo planning for Hospice care        Subjective:  Pt resting comfortably    Vitals:  /84 (BP Location: Left arm)   Pulse (!) 133   Temp 99 2 °F (37 3 °C) (Oral)   Resp 16   Ht 5' 10" (1 778 m)   Wt 129 kg (283 lb 4 7 oz)   SpO2 93%   BMI 40 65 kg/m²     I/Os:  I/O last 3 completed shifts: In: 2654 3 [P O :600; I V :2054 3]  Out: 2142 [Urine:2142]  I/O this shift: In: 0   Out: 75 [Drains:75]    Lab Results and Cultures:   Lab Results   Component Value Date    WBC 26 96 (H) 02/10/2019    HGB 7 4 (L) 02/10/2019    HCT 24 5 (L) 02/10/2019     (H) 02/10/2019     02/10/2019     Lab Results   Component Value Date    GLUCOSE 183 (H) 02/04/2019    CALCIUM 7 8 (L) 02/10/2019     01/02/2015    K 3 4 (L) 02/10/2019    CO2 30 02/10/2019     02/10/2019    BUN 45 (H) 02/10/2019    CREATININE 1 51 (H) 02/10/2019     Lab Results   Component Value Date    INR 1 19 (H) 02/04/2019    INR 1 08 02/04/2019    INR 1 04 12/19/2017    PROTIME 15 2 (H) 02/04/2019    PROTIME 13 4 02/04/2019    PROTIME 13 4 12/19/2017        Blood Culture:   Lab Results   Component Value Date    BLOODCX No Growth at 72 hrs  02/07/2019   ,   Urinalysis:   Lab Results   Component Value Date    COLORU Yellow 02/05/2019    CLARITYU Turbid 02/05/2019    SPECGRAV 1 034 (H) 02/05/2019    PHUR 5 0 02/05/2019    LEUKOCYTESUR Trace (A) 02/05/2019    NITRITE Negative 02/05/2019    GLUCOSEU 250 (1/4%) (A) 02/05/2019    KETONESU Negative 02/05/2019    BILIRUBINUR Negative 02/05/2019    BLOODU Large (A) 02/05/2019   ,   Urine Culture:   Lab Results   Component Value Date    URINECX No Growth <1000 cfu/mL 01/29/2017   ,   Wound Culure: No results found for: WOUNDCULT    Medications:  Current Facility-Administered Medications   Medication Dose Route Frequency    acetaminophen (TYLENOL) tablet 650 mg  650 mg Oral Q6H PRN    DULoxetine (CYMBALTA) delayed release capsule 30 mg  30 mg Oral Daily    gabapentin (NEURONTIN) capsule 100 mg  100 mg Oral TID    HYDROmorphone (DILAUDID) injection 0 5 mg  0 5 mg Intravenous Q3H PRN    levothyroxine tablet 50 mcg  50 mcg Oral Early Morning    melatonin tablet 6 mg  6 mg Oral HS    metoprolol tartrate (LOPRESSOR) tablet 100 mg  100 mg Oral Q12H [facility]    ondansetron (ZOFRAN) injection 4 mg  4 mg Intravenous Q4H PRN    oxyCODONE (ROXICODONE) IR tablet 5 mg  5 mg Oral Q4H PRN    Or    oxyCODONE (ROXICODONE) immediate release tablet 10 mg  10 mg Oral Q4H PRN    oxyCODONE (ROXICODONE) IR tablet 5 mg  5 mg Oral TID    QUEtiapine (SEROquel) tablet 25 mg  25 mg Oral HS    senna-docusate sodium (SENOKOT S) 8 6-50 mg per tablet 2 tablet  2 tablet Oral Daily       Physical Exam:    General appearance: fatigued, NAD  Lungs: clear to auscultation bilaterally  Heart: regular rate and rhythm, S1, S2 normal, no murmur, click, rub or gallop  Abdomen: soft, non-tender; bowel sounds normal; no masses,  no organomegaly  Extremities: LLE mottled, RLE warm, VACs intact    Wound/Incision:  B/L groin incision clean, dry, and intact          Rajni Bruner PA-C  2/11/2019

## 2019-02-11 NOTE — PROGRESS NOTES
Saw patient at bedside, reviewed medications administered and spoke to the patient's RN  He was nonverbal, kept his eyes closed and didn't provide any information or followed any commands for me  He is otherwise comfortable with current comfort meds ordered  Will continue the same  Still awaiting call back from [place] regarding placement issue  Level 4 comfort cares  Awaiting placement      Rita Galvan MD  Palliative Care and Hospice Medicine Fellow  PGY - 11

## 2019-02-12 NOTE — PROGRESS NOTES
Progress Note - Derrick Nieves 1940, 70 y o  male MRN: [id number]    [facility] Encounter: [id number]    Primary Care Provider: Maxine Yarbrough   Date and time admitted to hospital: 2019  2:39 PM        Ischemia of both lower extremities  Assessment & Plan  [place] IIb acute ischemia in the setting of left atrial thrombus and patient noncompliant with Eliquis therapy  Patient presenting with several days of decreased/no motor function and no distal signals    S/P B/L femoral embolectomy w/ b/l lower limb fasciotomies, VAC placement   - Neuropsych consulted to weigh in regarding competence - seen by neuropsych but no documentation in note regarding competency    - Not competent per Palliative Care  - Appreciate Palliative Care recommendations    Plan:  --Level 4 Comfort measures only   --Dispo planning for Hospice care  Awaiting inpatient bed  Will follow up with CM today        Subjective: No events overnight  Resting comfortably in bed with eyes closed  Not interactive    Vitals:  /73   Pulse (!) 150   Temp 99 2 °F (37 3 °C) (Oral)   Resp 16   Ht 5' 10" (1 778 m)   Wt 129 kg (283 lb 4 7 oz)   SpO2 93%   BMI 40 65 kg/m²     I/Os:  I/O last 3 completed shifts: In: 200 [P O :200]  Out: 7247 [MZBA; Drains:75]  No intake/output data recorded  Help Text   This SmartLink retrieves the last documented value for LDA assessment data, retrieved by either LDA type or by LDA group ID  This SmartLink should be used in a SmartText or SmartPhrase  If one is not available, please contact your           Lab Results and Cultures:   CBC with diff: Lab Results   Component Value Date    WBC 26 96 (H) 02/10/2019    HGB 7 4 (L) 02/10/2019    HCT 24 5 (L) 02/10/2019     (H) 02/10/2019     02/10/2019    MCH 31 1 02/10/2019    MCHC 30 2 (L) 02/10/2019    RDW 14 6 02/10/2019    MPV 10 9 02/10/2019    NRBC 1 02/10/2019   ,   BMP/CMP:  Lab Results Component Value Date    SODIUM 143 02/10/2019    K 3 4 (L) 02/10/2019    K 4 3 01/02/2015     02/10/2019     01/02/2015    CO2 30 02/10/2019    CO2 38 (H) 02/04/2019    ANIONGAP 10 01/02/2015    BUN 45 (H) 02/10/2019    BUN 65 (H) 01/02/2015    CREATININE 1 51 (H) 02/10/2019    CREATININE 2 47 (H) 01/02/2015    GLUCOSE 183 (H) 02/04/2019    GLUCOSE 106 01/02/2015    CALCIUM 7 8 (L) 02/10/2019    CALCIUM 9 0 01/02/2015     (H) 02/05/2019    AST 12 01/02/2015    ALT 34 02/05/2019    ALT 17 01/02/2015    ALKPHOS 63 02/05/2019    ALKPHOS 107 01/02/2015    PROT 6 4 01/02/2015    BILITOT 0 3 01/02/2015    EGFR 44 02/10/2019    EGFR 45 02/04/2019   ,   Lipid Panel: No results found for: CHOL,   Coags:   Lab Results   Component Value Date    PTT 66 (H) 02/10/2019    INR 1 19 (H) 02/04/2019   ,     Blood Culture:   Lab Results   Component Value Date    BLOODCX No Growth After 4 Days   02/07/2019   ,   Urinalysis: Lab Results   Component Value Date    COLORU Yellow 02/05/2019    CLARITYU Turbid 02/05/2019    SPECGRAV 1 034 (H) 02/05/2019    PHUR 5 0 02/05/2019    LEUKOCYTESUR Trace (A) 02/05/2019    NITRITE Negative 02/05/2019    GLUCOSEU 250 (1/4%) (A) 02/05/2019    KETONESU Negative 02/05/2019    BILIRUBINUR Negative 02/05/2019    BLOODU Large (A) 02/05/2019   ,   Urine Culture:   Lab Results   Component Value Date    URINECX No Growth <1000 cfu/mL 01/29/2017   ,   Wound Culure: No results found for: WOUNDCULT    Medications:  Current Facility-Administered Medications   Medication Dose Route Frequency    acetaminophen (TYLENOL) oral suspension 650 mg  650 mg Oral Q6H    DULoxetine (CYMBALTA) delayed release capsule 30 mg  30 mg Oral Daily    gabapentin (NEURONTIN) capsule 100 mg  100 mg Oral TID    HYDROmorphone (DILAUDID) injection 0 5 mg  0 5 mg Intravenous Q3H PRN    levothyroxine tablet 50 mcg  50 mcg Oral Early Morning    melatonin tablet 6 mg  6 mg Oral HS    metoprolol tartrate (LOPRESSOR) tablet 100 mg  100 mg Oral Q12H [facility]    morphine (PF) 10 mg/mL injection 10 mg  10 mg Intravenous Q4H PRN    morphine (PF) 4 mg/mL injection 4 mg  4 mg Intravenous Q4H PRN    ondansetron (ZOFRAN) injection 4 mg  4 mg Intravenous Q4H PRN    oxyCODONE (ROXICODONE) IR tablet 5 mg  5 mg Oral Q4H PRN    Or    oxyCODONE (ROXICODONE) immediate release tablet 10 mg  10 mg Oral Q4H PRN    QUEtiapine (SEROquel) tablet 25 mg  25 mg Oral HS    senna-docusate sodium (SENOKOT S) 8 6-50 mg per tablet 2 tablet  2 tablet Oral Daily       Imaging:      Physical Exam:    General: NAD  Resting comfortably in bed  CV: RRR  Respiratory: nonlabored respirations on RA  Abdominal: soft, NT  Extremities: VACs in place with good suction  L foot pale/mottled  Foul smell likely from nonviable L leg   R foot warm      Teresa Andre MD  2/12/2019

## 2019-02-12 NOTE — PROGRESS NOTES
02/12/19 1100   Plan of Care   Comments Pt  visited no family presence, pt  was sleeping     Assessment Completed by: Unit visit

## 2019-02-12 NOTE — PROGRESS NOTES
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 [facility]    DDD (degenerative disc disease), lumbosacral    Chronic diastolic congestive heart failure [facility]    Hypothyroidism    Thrombus of left atrial appendage without antecedent myocardial infarction    Ischemia of both lower extremities    Bilateral occlusive lower extremity thrombus    SEEVN (acute kidney injury) ([facility] )    Goals of care, counseling/discussion    Pain         Plan:  1  Symptom management -    - morphine 4mg IV QID [address]   - morphine 4mg IV Q1H PRN pain or dyspnea   - ativan 1mg Q2H PRN anxiety or dyspnea   -please call or page palliative with questions or concerns regarding comfort management    2  Goals - level 4 comfort care   - continue comfort guided care while admitted   - approved for hospice IPU, but no beds available at this time   - will reassess if patient is appropriate for transfer tomorrow     Code Status: comfort - Level 4   Decisional apparatus:  Patient is not competent on my exam today  If competence is lost, patient's substitute decision maker would default to sister, Iveth Huber, by [place] Act 169  Advance Directive / Living Will / POLST:  None on file     I have reviewed the patient's controlled substance dispensing history in the Prescription Drug Monitoring Program in compliance with the  [facility] regulations before prescribing any controlled substances  Interval history:       No events overnight  Patient is unresponsive, PPS 10%, tachycardic, mottled feet, breathing appears labored and consistent with Cheyne Royal respirations  Adjusted orders to optimize symptom management  Called sister, Iveth Huber, to provide an update   She is unfortunately home with a migraine and unable to visit  She is aware of suspected prognosis of hours to days  MEDICATIONS / ALLERGIES:     all current active meds have been reviewed and current meds:   Current Facility-Administered Medications   Medication Dose Route Frequency    LORazepam (ATIVAN) 2 mg/mL injection 1 mg  1 mg Intravenous Q2H PRN    morphine (PF) 4 mg/mL injection 4 mg  4 mg Intravenous 4x Daily    morphine (PF) 4 mg/mL injection 4 mg  4 mg Intravenous Q1H PRN    senna-docusate sodium (SENOKOT S) 8 6-50 mg per tablet 2 tablet  2 tablet Oral Daily       Allergies   Allergen Reactions    Motrin [Ibuprofen] GI Intolerance       OBJECTIVE:    Physical Exam  Physical Exam   Constitutional:   Lying in bed, unresponsive, PPS 10%   HENT:   Head: Normocephalic and atraumatic  Eyes:   Does not open eyes to voice or tactile stimuli   Cardiovascular:   tachycardia   Pulmonary/Chest:   cheyne you respirations   Abdominal: Soft  He exhibits no distension  There is no guarding  obese   Musculoskeletal:   Bilateral wound vacs on LE   Neurological:   somnolent   Skin:   Bilateral mottling of feet   Psychiatric:   Unable to assess         Counseling / Coordination of Care  Total floor / unit time spent today 35+ minutes  Greater than 50% of total time was spent with the patient and / or family counseling and / or coordination of care  A description of the counseling / coordination of care: time spent with patient, communicating with nurse Kathrin Scheuermann), calling sister, Austinclint Tate, to provide an update, and discussing plan with Hospice Nurse Liaison

## 2019-02-12 NOTE — PROGRESS NOTES
02/12/19 1100   Clinical Encounter Type   Visited With Health care provider   Routine Visit Follow-up

## 2019-02-12 NOTE — ASSESSMENT & PLAN NOTE
[place] IIb acute ischemia in the setting of left atrial thrombus and patient noncompliant with Eliquis therapy  Patient presenting with several days of decreased/no motor function and no distal signals    S/P B/L femoral embolectomy w/ b/l lower limb fasciotomies, VAC placement 2/4  - Neuropsych consulted to weigh in regarding competence - seen by neuropsych but no documentation in note regarding competency    - Not competent per Palliative Care  - Appreciate Palliative Care recommendations    Plan:  --Level 4 Comfort measures only   --Dispo planning for Hospice care  Awaiting inpatient bed   Will follow up with CM today

## 2019-02-12 NOTE — UTILIZATION REVIEW
Continued Stay Review    Date: 2/13/19    Vital Signs: /73   Pulse (!) 150   Temp 99 2 °F (37 3 °C) (Oral)   Resp 16   Ht 5' 10" (1 778 m)   Wt 129 kg (283 lb 4 7 oz)   SpO2 93%   BMI 40 65 kg/m²      Assessment/Plan:   Ischemia of both lower extremities  Assessment & Plan  [place] IIb acute ischemia in the setting of left atrial thrombus and patient noncompliant with Eliquis therapy  Patient presenting with several days of decreased/no motor function and no distal signals     S/P B/L femoral embolectomy w/ b/l lower limb fasciotomies, VAC placement 2/4  - Neuropsych consulted to weigh in regarding competence - seen by neuropsych but no documentation in note regarding competency     - Not competent per Palliative Care  - Appreciate Palliative Care recommendations     Plan:  --Level 4 Comfort measures only   --Dispo planning for Hospice care  Awaiting inpatient bed  Will follow up with CM today      Subjective: No events overnight  Resting comfortably in bed with eyes closed   Not interactive    Medications:   Scheduled Meds:   Current Facility-Administered Medications:  LORazepam 1 mg Intravenous Q2H PRN   morphine injection 4 mg Intravenous 4x Daily   morphine injection 4 mg Intravenous Q1H PRN   senna-docusate sodium 2 tablet Oral Daily     PRN Meds:   LORazepam    morphine injection  X2    Age/Sex: 70 y o  male     Discharge Plan: WAITING FOR IP HOSPICE BED TO BECOME AVAILABLE - PALLIATIVE CARE NOTE - PT APPEARS TO BE ACTIVELY DYING

## 2019-02-12 NOTE — HOSPICE NOTE
Pt is approved for IPU, no beds currently available  Called sister Gee Schuler and let VM in reference to same  She is aware we will contact her when bed becomes available  MANUELA hernandez

## 2019-02-13 NOTE — QUICK NOTE
Called to the patient's bedside by NEGRITA Leong to pronounce pt  Patient was DNR/DNI comfort measures level 4, awaiting Hospice bed placement  The patient was identified by arm band  There were no spontaneous movements, respirations, or reaction to verbal or tactile stimuli  Brainstem reflexes (bilateral pupillary, gag, corneal) were absent  Bilateral pupils were fixed and dilated  No lung or heart sounds were auscultated  Bilateral carotid/radial/femoral pulses were absent  Pronounced at 19:44  Pt's sister, Aníbal Ramon, reached by telephone  Family could not be present at bedside due to inclement weather  Attending physician Dr Leah Ruggiero Doctor was notified       Nate Arroyo MD  PGY-1, General Surgery

## 2019-02-13 NOTE — PROGRESS NOTES
Patient's sister, Ben Garza, who is the patient's POA was notified of patient's passing at 7:45pm  She and her family are unable to make it to the hospital tonight due to current weather conditions      Claire Bhakta MD

## 2019-02-13 NOTE — DEATH NOTE
INPATIENT DEATH NOTE  Stepan Burkett 70 y o  male MRN: [id number]  Unit/Bed#: [facility] 528-01 Encounter: [id number]             PHYSICAL EXAM:  Unresponsive to noxious stimuli, Spontaneous respirations absent, Breath sounds absent, Carotid pulse absent, Heart sounds absent, Pupillary light reflex absent and Corneal blink reflex absent    Medical Examiner notification criteria:  NONE APPLICABLE   Medical Examiner's office notified?:  No, does not meet ME notification criteria   Medical Examiner accepted case?:  No  Name of Medical Examiner: n/a         Autopsy Options:  Decision for post-mortem examination not yet made by next of kin      Primary Service Attending Physician notified?:  yes - Attending:  Dr Alphonso Coleman Doctor    Physician/Resident responsible for completing Discharge Summary:  Susannah Freeman MD

## 2019-02-13 NOTE — DISCHARGE SUMMARY
Discharge Summary - Radha Syed 70 y o  male MRN: [id number]    Unit/Bed#: [facility] 528-01 Encounter: [id number] PCP: Aishwarya Lechuga    Admission Date:   Admission Orders (From admission, onward)    Ordered        02/04/19 1752  Inpatient Admission  Once     Order ID Start Status   [id number] 02/04/19 1749 Completed                Admitting Diagnosis: Ischemia of both lower extremities [I99 8]  Atrial fibrillation with rapid ventricular response ([address] ) [I48 91]    HPI: Per Dr Margarito Faith on 2/4/19: " Radha Syed is a 70 y o  male who presents with bilateral lower extremity pain, and inability to ambulate since Saturday night  Patient has newly diagnosed A fib in December on Eliquis  Patient reports eating a lot of salt on Saturday night and subsequently feeling leg swelling and trouble moving assocaited with pain  Thought it would improve  Stayed non-ambulatory Sunday  Sister encouraged evaluation in ED today "    Procedures Performed: No orders of the defined types were placed in this encounter  Summary of Hospital Course:     Pt presented to B on 2/4 c/o bilateral lower extremity pain, inability to ambulate for 3 days  Upon physical exam, both lower extremities had evidence of profound ischemia, pale and cold to touch, with minimal motor and sensation below the knee  CTA showed acute bilateral occlusion of the bilateral CFA, SFA, popliteal and tibioperoneal trunk  Bilateral AT and PT were intermittently occluded with weak distal reconstitution  Pt was admitted to the Vascular Surgery service and was taken to the OR emergently for bilateral femoral thromboembolectomy with primary closure and bilateral 4-compartment fasciotomies  Pt was admitted to the Surgical ICU post-operatively with R dopplerable DP signal, nondopplerable L DP/PT signals  In the immediate days post-operatively, pt's LLE appeared dusky without any appreciable dopplerable signals and RLE had monophasic signal in R DP   On 2/5, pt had venous bleeding noted from his R medial fasciotomy VAC site, which required one 3-0 nylon suture to a bleeding surface vein  On examination on , pt's R foot appeared well-perfused but was devoid of any motor or sensation  On , pt was febrile with rising leukocytosis  His LLE continued to appear dusky with decreased motor and sensation and nondopplerable signals, so recommendation was made for an above knee amputation  Pt refused to be evaluated by Neuropsychology for capacity determination, expressing that he did not desire any amputations  Palliative Care team was consulted to further aid in decision making  With pt and family in agreement, pt was made Level 4/Comfort Care, with plans to transfer pt to inpatient Hospice  On , pt  before Hospice bed became available   Pt's family was informed by telephone as they could not be present at bedside due to inclement weather,    Significant Findings, Care, Treatment and Services Provided:     - Bilateral femoral thromboembolectomy with primary closure and bilateral 4-compartment fasciotomies--[place]    Complications: Severe sepsis    Disposition:      Final Diagnosis: Severe sepsis, ischemia of bilateral lower extremities    Resolved Problems  Date Reviewed: 2019    None          Date, Time and Cause of Death    Date of Death:  19  Time of Death:   7:44 PM  Preliminary Cause of Death:  Severe sepsis ([facility] )  Entered by:  Piyush Todd MD[KK1 1]     Attribution     KK1 1 Piyush Todd MD 19 20:04
